# Patient Record
Sex: FEMALE | Race: WHITE | Employment: OTHER | URBAN - METROPOLITAN AREA
[De-identification: names, ages, dates, MRNs, and addresses within clinical notes are randomized per-mention and may not be internally consistent; named-entity substitution may affect disease eponyms.]

---

## 2017-01-05 RX ORDER — AMLODIPINE BESYLATE 5 MG/1
5 TABLET ORAL DAILY
Qty: 90 TAB | Refills: 3 | Status: SHIPPED | OUTPATIENT
Start: 2017-01-05 | End: 2017-12-29 | Stop reason: SDUPTHER

## 2017-01-20 ENCOUNTER — TELEPHONE (OUTPATIENT)
Dept: FAMILY MEDICINE CLINIC | Age: 82
End: 2017-01-20

## 2017-01-20 RX ORDER — DOXYCYCLINE 100 MG/1
100 TABLET ORAL 2 TIMES DAILY
Qty: 20 TAB | Refills: 0 | Status: SHIPPED | OUTPATIENT
Start: 2017-01-20 | End: 2017-01-30

## 2017-01-20 NOTE — TELEPHONE ENCOUNTER
Sent doxycycline in to [facility]. Take this twice a day with a full glass of water. Come in for an appointment if not feeling better next week.  Patient's daughter notified

## 2017-01-23 ENCOUNTER — CLINICAL SUPPORT (OUTPATIENT)
Dept: FAMILY MEDICINE CLINIC | Age: 82
End: 2017-01-23

## 2017-01-23 VITALS
OXYGEN SATURATION: 97 % | WEIGHT: 148 LBS | HEIGHT: 63 IN | DIASTOLIC BLOOD PRESSURE: 70 MMHG | SYSTOLIC BLOOD PRESSURE: 150 MMHG | BODY MASS INDEX: 26.22 KG/M2 | HEART RATE: 76 BPM

## 2017-01-23 DIAGNOSIS — Z51.81 ANTICOAGULATION GOAL OF INR 2 TO 3: ICD-10-CM

## 2017-01-23 DIAGNOSIS — I48.20 CHRONIC ATRIAL FIBRILLATION (HCC): Primary | ICD-10-CM

## 2017-01-23 DIAGNOSIS — Z79.01 ANTICOAGULATION GOAL OF INR 2 TO 3: ICD-10-CM

## 2017-01-23 LAB
INR BLD: 2.2
PT POC: 26.6 SEC
VALID INTERNAL CONTROL?: YES

## 2017-01-23 RX ORDER — WARFARIN SODIUM 5 MG/1
5 TABLET ORAL DAILY
COMMUNITY
End: 2018-11-23 | Stop reason: SDUPTHER

## 2017-01-23 NOTE — PROGRESS NOTES
Patient presents for a PT/INR  See med list and patient instructions along with results for verbal orders by Dr. Jackeline Sykes. Results for orders placed or performed in visit on 01/23/17   AMB POC PT/INR   Result Value Ref Range    VALID INTERNAL CONTROL POC Yes     Prothrombin time (POC) 26.6 sec    INR POC 2.2      Prior to Admission medications    Medication Sig Start Date End Date Taking? Authorizing Provider   warfarin (COUMADIN) 5 mg tablet Take 5 mg by mouth daily. Take one and a half tablets, 7.5 mg on Tuesday and Thursday. Take one tablet, 5mg all other days. Yes Historical Provider   doxycycline (ADOXA) 100 mg tablet Take 1 Tab by mouth two (2) times a day for 10 days. 1/20/17 1/30/17 Yes Alessandra Antoine MD   amLODIPine (NORVASC) 5 mg tablet Take 1 Tab by mouth daily. 1/5/17  Yes Ray López MD   flecainide (TAMBOCOR) 100 mg tablet Take 1 Tab by mouth three (3) times daily. 9/30/16  Yes Ray López MD   levothyroxine (SYNTHROID) 50 mcg tablet TAKE 1 TABLET BY MOUTH EVERY MORNING BEFORE BREAKFAST FOR THYROID 6/22/16  Yes Ray López MD   hydrochlorothiazide (HYDRODIURIL) 25 mg tablet Take 25 mg by mouth daily. Yes Historical Provider   doxazosin (CARDURA) 4 mg tablet Take 1 mg by mouth two (2) times a day. Yes Historical Provider     Patient Instructions   Take coumadin 5 mg tablet: take one and a half tablets, 7.5 mg on Tuesday and Thursday. Take one tablet, 5mg all other days. Return in week. She will come back in one week do to taking Doxycycline for 14 days. She declined AVS but voiced understanding of all instructions.

## 2017-01-23 NOTE — PATIENT INSTRUCTIONS
Take coumadin 5 mg tablet: take one and a half tablets, 7.5 mg on Tuesday and Thursday. Take one tablet, 5mg all other days. Return in week.

## 2017-02-03 ENCOUNTER — CLINICAL SUPPORT (OUTPATIENT)
Dept: FAMILY MEDICINE CLINIC | Age: 82
End: 2017-02-03

## 2017-02-03 VITALS
WEIGHT: 149 LBS | BODY MASS INDEX: 26.4 KG/M2 | HEIGHT: 63 IN | DIASTOLIC BLOOD PRESSURE: 52 MMHG | OXYGEN SATURATION: 97 % | HEART RATE: 78 BPM | SYSTOLIC BLOOD PRESSURE: 151 MMHG

## 2017-02-03 DIAGNOSIS — I48.20 CHRONIC ATRIAL FIBRILLATION (HCC): Primary | ICD-10-CM

## 2017-02-03 DIAGNOSIS — Z79.01 ANTICOAGULATION GOAL OF INR 2 TO 3: ICD-10-CM

## 2017-02-03 DIAGNOSIS — Z51.81 ANTICOAGULATION GOAL OF INR 2 TO 3: ICD-10-CM

## 2017-02-03 LAB
INR BLD: 2.3
PT POC: 28.8 SEC
VALID INTERNAL CONTROL?: YES

## 2017-02-03 NOTE — PATIENT INSTRUCTIONS
Continue coumadin 5 mg tablet, Take one and a half tablets, 7.5 mg on Tuesday and Thursday. Take one tablet, 5mg all other days. Come back for follow in one month.

## 2017-02-03 NOTE — PROGRESS NOTES
Patient presents for a PT/INR  See med list and patient instructions along with results for verbal orders by Dr. Cathi Huerta. Results for orders placed or performed in visit on 02/03/17   AMB POC PT/INR   Result Value Ref Range    VALID INTERNAL CONTROL POC Yes     Prothrombin time (POC) 28.8 sec    INR POC 2.3      Vitals:    02/03/17 1437   BP: 151/52   Pulse: 78   SpO2: 97%   Weight: 149 lb (67.6 kg)   Height: 5' 3\" (1.6 m)     Prior to Admission medications    Medication Sig Start Date End Date Taking? Authorizing Provider   warfarin (COUMADIN) 5 mg tablet Take 5 mg by mouth daily. Take one and a half tablets, 7.5 mg on Tuesday and Thursday. Take one tablet, 5mg all other days. Yes Historical Provider   amLODIPine (NORVASC) 5 mg tablet Take 1 Tab by mouth daily. 1/5/17  Yes Rema Valentine MD   flecainide (TAMBOCOR) 100 mg tablet Take 1 Tab by mouth three (3) times daily. 9/30/16  Yes Rema Valentine MD   levothyroxine (SYNTHROID) 50 mcg tablet TAKE 1 TABLET BY MOUTH EVERY MORNING BEFORE BREAKFAST FOR THYROID 6/22/16  Yes Rema Valentine MD   hydrochlorothiazide (HYDRODIURIL) 25 mg tablet Take 25 mg by mouth daily. Yes Historical Provider   doxazosin (CARDURA) 4 mg tablet Take 1 mg by mouth two (2) times a day. Yes Historical Provider     Patient Instructions   Continue coumadin 5 mg tablet, Take one and a half tablets, 7.5 mg on Tuesday and Thursday. Take one tablet, 5mg all other days. Come back for follow in one month.      Verbalized understanding of all instructions, declined need for AVS

## 2017-03-23 ENCOUNTER — HOSPITAL ENCOUNTER (OUTPATIENT)
Dept: LAB | Age: 82
Discharge: HOME | End: 2017-03-23
Payer: MEDICARE

## 2017-03-23 ENCOUNTER — OFFICE VISIT (OUTPATIENT)
Dept: FAMILY MEDICINE CLINIC | Age: 82
End: 2017-03-23

## 2017-03-23 VITALS
RESPIRATION RATE: 17 BRPM | BODY MASS INDEX: 26.22 KG/M2 | HEIGHT: 63 IN | WEIGHT: 148 LBS | HEART RATE: 73 BPM | DIASTOLIC BLOOD PRESSURE: 61 MMHG | OXYGEN SATURATION: 97 % | TEMPERATURE: 98.2 F | SYSTOLIC BLOOD PRESSURE: 159 MMHG

## 2017-03-23 DIAGNOSIS — E03.9 HYPOTHYROIDISM, UNSPECIFIED TYPE: ICD-10-CM

## 2017-03-23 DIAGNOSIS — Z00.00 ROUTINE GENERAL MEDICAL EXAMINATION AT A HEALTH CARE FACILITY: Primary | ICD-10-CM

## 2017-03-23 DIAGNOSIS — E78.5 HYPERLIPIDEMIA, UNSPECIFIED HYPERLIPIDEMIA TYPE: ICD-10-CM

## 2017-03-23 DIAGNOSIS — Z13.39 SCREENING FOR ALCOHOLISM: ICD-10-CM

## 2017-03-23 DIAGNOSIS — I10 ESSENTIAL HYPERTENSION: ICD-10-CM

## 2017-03-23 DIAGNOSIS — I27.20 PULMONARY HYPERTENSION (HCC): ICD-10-CM

## 2017-03-23 DIAGNOSIS — I48.20 CHRONIC ATRIAL FIBRILLATION (HCC): ICD-10-CM

## 2017-03-23 DIAGNOSIS — Z23 ENCOUNTER FOR IMMUNIZATION: ICD-10-CM

## 2017-03-23 LAB
INR BLD: 1.6 (ref 1–1.5)
PT POC: 19 SEC (ref 9.1–12)
VALID INTERNAL CONTROL?: YES

## 2017-03-23 PROCEDURE — 85025 COMPLETE CBC W/AUTO DIFF WBC: CPT

## 2017-03-23 PROCEDURE — 36415 COLL VENOUS BLD VENIPUNCTURE: CPT

## 2017-03-23 PROCEDURE — 84443 ASSAY THYROID STIM HORMONE: CPT

## 2017-03-23 PROCEDURE — 80053 COMPREHEN METABOLIC PANEL: CPT

## 2017-03-23 PROCEDURE — 80061 LIPID PANEL: CPT

## 2017-03-23 NOTE — PATIENT INSTRUCTIONS
Medicare Wellness Visit, Female    The best way to live healthy is to have a healthy lifestyle by eating a well-balanced diet, exercising regularly, limiting alcohol and stopping smoking. Regular physical exams and screening tests are another way to keep healthy. Preventive exams provided by your health care provider can find health problems before they become diseases or illnesses. Preventive services including immunizations, screening tests, monitoring and exams can help you take care of your own health. All people over age 72 should have a pneumovax  and and a prevnar shot to prevent pneumonia. These are once in a lifetime unless you and your provider decide differently. All people over 65 should have a yearly flu shot and a tetanus vaccine every 10 years. A bone mass density to screen for osteoporosis or thinning of the bones should be done every 2 years after 65. Screening for diabetes mellitus with a blood sugar test should be done every year. Glaucoma is a disease of the eye due to increased ocular pressure that can lead to blindness and it should be done every year by an eye professional.    Cardiovascular screening tests that check for elevated lipids (fatty part of blood) which can lead to heart disease and strokes should be done every 5 years. Colorectal screening that evaluates for blood or polyps in your colon should be done yearly as a stool test or every five years as a flexible sigmoidoscope or every 10 years as a colonoscopy up to age 76. Breast cancer screening with a mammogram is recommended biennially  for women age 54-69. Screening for cervical cancer with a pap smear and pelvic exam is recommended for women after age 72 years every 2 years up to age 79 or when the provider and patient decide to stop. If there is a history of cervical abnormalities or other increased risk for cancer then the test is recommended yearly.     Hepatitis C screening is also recommended for anyone born between [birth year] through [address]. A shingles vaccine is also recommended once in a lifetime after age 61. Your Medicare Wellness Exam is recommended annually. Here is a list of your current Health Maintenance items with a due date:  Health Maintenance Due   Topic Date Due    Pneumococcal Vaccine (2 of 2 - PCV13) 12/06/2014            Vaccine Information Statement     Pneumococcal Conjugate Vaccine (PCV13): What You Need to Know    Many Vaccine Information Statements are available in Swedish and other languages. See www.immunize.org/vis. Hojas de información Sobre Vacunas están disponibles en español y en muchos otros idiomas. Visite www.immunize.org/vis. 1. Why get vaccinated? Vaccination can protect both children and adults from pneumococcal disease. Pneumococcal disease is caused by bacteria that can spread from person to person through close contact. It can cause ear infections, and it can also lead to more serious infections of the:   Lungs (pneumonia),   Blood (bacteremia), and   Covering of the brain and spinal cord (meningitis). Pneumococcal pneumonia is most common among adults. Pneumococcal meningitis can cause deafness and brain damage, and it kills about 1 child in 10 who get it. Anyone can get pneumococcal disease, but children under 3years of age and adults 72 years and older, people with certain medical conditions, and cigarette smokers are at the highest risk. Before there was a vaccine, the [facility] saw:   more than 700 cases of meningitis,   about 13,000 blood infections,   about 5 million ear infections, and   about 200 deaths  in children under 5 each year from pneumococcal disease. Since vaccine became available, severe pneumococcal disease in these children has fallen by 88%. About 18,000 older adults die of pneumococcal disease each year in the [place].     Treatment of pneumococcal infections with penicillin and other drugs is not as effective as it used to be, because some strains of the disease have become resistant to these drugs. This makes prevention of the disease, through vaccination, even more important. 2. PCV13 vaccine    Pneumococcal conjugate vaccine (called PCV13) protects against 13 types of pneumococcal bacteria. PCV13 is routinely given to children at 2, 4, 6, and 1515 months of age. It is also recommended for children and adults 3to 59years of age with certain health conditions, and for all adults 72years of age and older. Your doctor can give you details. 3. Some people should not get this vaccine    Anyone who has ever had a life-threatening allergic reaction to a dose of this vaccine, to an earlier pneumococcal vaccine called PCV7, or to any vaccine containing diphtheria toxoid (for example, DTaP), should not get PCV13. Anyone with a severe allergy to any component of PCV13 should not get the vaccine. Tell your doctor if the person being vaccinated has any severe allergies. If the person scheduled for vaccination is not feeling well, your healthcare provider might decide to reschedule the shot on another day. 4. Risks of a vaccine reaction    With any medicine, including vaccines, there is a chance of reactions. These are usually mild and go away on their own, but serious reactions are also possible. Problems reported following PCV13 varied by age and dose in the series. The most common problems reported among children were:    About half became drowsy after the shot, had a temporary loss of appetite, or had redness or tenderness where the shot was given.  About 1 out of 3 had swelling where the shot was given.  About 1 out of 3 had a mild fever, and about 1 in 20 had a fever over 102.2°F.   Up to about 8 out of 10 became fussy or irritable. Adults have reported pain, redness, and swelling where the shot was given; also mild fever, fatigue, headache, chills, or muscle pain.     Abida Battles children who get PCV13 along with inactivated flu vaccine at the same time may be at increased risk for seizures caused by fever. Ask your doctor for more information. Problems that could happen after any vaccine:     People sometimes faint after a medical procedure, including vaccination. Sitting or lying down for about 15 minutes can help prevent fainting, and injuries caused by a fall. Tell your doctor if you feel dizzy, or have vision changes or ringing in the ears.  Some older children and adults get severe pain in the shoulder and have difficulty moving the arm where a shot was given. This happens very rarely.  Any medication can cause a severe allergic reaction. Such reactions from a vaccine are very rare, estimated at about 1 in a million doses, and would happen within a few minutes to a few hours after the vaccination. As with any medicine, there is a very small chance of a vaccine causing a serious injury or death. The safety of vaccines is always being monitored. For more information, visit: www.cdc.gov/vaccinesafety/     5. What if there is a serious reaction? What should I look for?  Look for anything that concerns you, such as signs of a severe allergic reaction, very high fever, or unusual behavior. Signs of a severe allergic reaction can include hives, swelling of the face and throat, difficulty breathing, a fast heartbeat, dizziness, and weakness  usually within a few minutes to a few hours after the vaccination. What should I do?  If you think it is a severe allergic reaction or other emergency that cant wait, call 9-1-1 or get the person to the nearest hospital. Otherwise, call your doctor. Reactions should be reported to the Vaccine Adverse Event Reporting System (VAERS). Your doctor should file this report, or you can do it yourself through the VAERS web site at www.vaers. hhs.gov, or by calling 3-501.893.5965.     [employer] does not give medical advice. 6. The National Vaccine Injury Compensation Program    The [facility] Vaccine Injury Compensation Program (VICP) is a federal program that was created to compensate people who may have been injured by certain vaccines. Persons who believe they may have been injured by a vaccine can learn about the program and about filing a claim by calling 3-705.598.2474 or visiting the [address] website at www.[facility].gov/vaccinecompensation. There is a time limit to file a claim for compensation. 7. How can I learn more?  Ask your healthcare provider. He or she can give you the vaccine package insert or suggest other sources of information.  Call your local or state health department.  Contact the Centers for Disease Control and Prevention (CDC):  - Call 2-634.616.4618 (1-800-CDC-INFO) or  - Visit CDCs website at www.cdc.gov/vaccines    Vaccine Information Statement   PCV13 Vaccine   11/5/2015   [address] [id number]-04    Department of Health and Human Services  Centers for Disease Control and Prevention    Office Use Only

## 2017-03-23 NOTE — PROGRESS NOTES
This is a Subsequent Medicare Annual Wellness Visit providing Personalized Prevention Plan Services (PPPS) (Performed 12 months after initial AWV and PPPS )    I have reviewed the patient's medical history in detail and updated the computerized patient record. History     Past Medical History:   Diagnosis Date    [facility] fibrillation ([facility] [address]     Hyperlipidemia 6/18/2014    Hypertension     Hypothyroid 3/18/2014    Pulmonary hypertension ([facility] [address] 2/1/2016 1/16 - PASP=68, EF 55%     S/P lobectomy of lung       Past Surgical History:   Procedure Laterality Date    CHEST SURGERY PROCEDURE UNLISTED  1951    lower lobe right lung    HX APPENDECTOMY  2008     Current Outpatient Prescriptions   Medication Sig Dispense Refill    warfarin (COUMADIN) 5 mg tablet Take 5 mg by mouth daily. Take one and a half tablets, 7.5 mg on Tuesday and Thursday. Take one tablet, 5mg all other days.  amLODIPine (NORVASC) 5 mg tablet Take 1 Tab by mouth daily. 90 Tab 3    flecainide (TAMBOCOR) 100 mg tablet Take 1 Tab by mouth three (3) times daily. 270 Tab 1    levothyroxine (SYNTHROID) 50 mcg tablet TAKE 1 TABLET BY MOUTH EVERY MORNING BEFORE BREAKFAST FOR THYROID 90 Tab 3    hydrochlorothiazide (HYDRODIURIL) 25 mg tablet Take 25 mg by mouth daily.  doxazosin (CARDURA) 4 mg tablet Take 1 mg by mouth two (2) times a day. No Known Allergies  Family History   Problem Relation Age of Onset    Cancer Mother      Social History   Substance Use Topics    Smoking status: Never Smoker    Smokeless tobacco: Never Used    Alcohol use No     Patient Active Problem List   Diagnosis Code    Hypertension I10    Atrial fibrillation (HCC) I48.91    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    Hypothyroid E03.9    Hyperlipidemia E78.5    Pulmonary hypertension (HCC) I27.2     A fib and hypertension - had stress echo earlier this week and was told all was good. Has switched from Dr. Petty Bey to Dr Bud Santana.   No medications were changed - remains on flecainide. No swelling in feet, no chest pain, no shortness of breath. Not taking flecainide regularly. States she's taking coumadin in the mornings - not at a regular time. On tues and thurs taking 1.5 pills, all other days taking 1. Today's INR is 1.6. Hypothyroidism - no hair loss, no constipation, no dry skin or brittle nails, no palpitations. Taking medication each morning on an empty stomach. Hyperlipidemia - takes medication at night as directed, no muscle aches. Tries to watch diet. Depression Risk Factor Screening:     PHQ 2 / 9, over the last two weeks 3/23/2017   Little interest or pleasure in doing things Not at all   Feeling down, depressed or hopeless Not at all   Total Score PHQ 2 0     Alcohol Risk Factor Screening: On any occasion during the past 3 months, have you had more than 3 drinks containing alcohol? No    Do you average more than 7 drinks per week? No      Functional Ability and Level of Safety:     Hearing Loss   none    Activities of Daily Living   Self-care. Requires assistance with: no ADLs    Fall Risk     Fall Risk Assessment, last 12 mths 3/23/2017   Able to walk? Yes   Fall in past 12 months? No     Abuse Screen   Patient is not abused    Review of Systems   A comprehensive review of systems was negative except for that written in the HPI. Physical Examination     Evaluation of Cognitive Function:  Mood/affect:  happy  Appearance: age appropriate  Family member/caregiver input: none    Visit Vitals    /61 (BP 1 Location: Left arm, BP Patient Position: Sitting)    Pulse 73    Temp 98.2 °F (36.8 °C) (Oral)    Resp 17    Ht 5' 3\" (1.6 m)    Wt 148 lb (67.1 kg)    SpO2 97%    BMI 26.22 kg/m2     General:  Alert, cooperative, no distress, appears stated age. Head:  Normocephalic, without obvious abnormality, atraumatic. Eyes:  Conjunctivae/corneas clear. PERRL, EOMs intact. Fundi benign.    Ears:  Normal TMs and external ear canals both ears. Nose: Nares normal. Septum midline. Mucosa normal. No drainage or sinus tenderness. Throat: Lips, mucosa, and tongue normal. Teeth and gums normal.   Neck: Supple, symmetrical, trachea midline, no adenopathy, thyroid: no enlargement/tenderness/nodules, no carotid bruit and no JVD. Back:   Symmetric, no curvature. ROM normal. No CVA tenderness. Lungs:   Clear to auscultation bilaterally. Chest wall:  No tenderness or deformity. Heart:  Regular rate and rhythm, S1, S2 normal, no murmur, click, rub or gallop. Abdomen:   Soft, non-tender. Bowel sounds normal. No masses,  No organomegaly. Extremities: Extremities normal, atraumatic, no cyanosis or edema. Pulses: 2+ and symmetric all extremities. Skin: Some varicosities in lower extremities   Lymph nodes: Cervical, supraclavicular, and axillary nodes normal.   Neurologic: CNII-XII intact. Normal strength, sensation and reflexes throughout. Slight tremor of hands. Patient Care Team:  Lewis Mo MD as PCP - General (Internal Medicine)  Ghada Westfall MD (Cardiology)    Advice/Referrals/Counseling   Education and counseling provided:  Are appropriate based on today's review and evaluation  End-of-Life planning (with patient's consent)      Assessment/Plan       ICD-10-CM ICD-9-CM    1. Routine general medical examination at a health care facility - Health Maintenance reviewed - prevnar today Z00.00 V70.0    2. Screening for alcoholism Z13.89 V79.1    3. Chronic atrial fibrillation (HCC) - INR low today, but may have missed a dose. No change to dose, repeat INR in 1 week. I48.2 427.31 AMB POC PT/INR   4. Pulmonary hypertension (HCC) I27.2 416.8    5. Essential hypertension - At goal given age. Continue current medications. I10 401.9 CBC WITH AUTOMATED DIFF      METABOLIC PANEL, COMPREHENSIVE   6. Hypothyroidism, unspecified type -No changes in medications pending lab results.    E03.9 244.9 TSH 3RD GENERATION   7. Hyperlipidemia, unspecified hyperlipidemia type - No changes in medications pending lab results. E78.5 272.4 LIPID PANEL   . Current Outpatient Prescriptions   Medication Sig Dispense Refill    warfarin (COUMADIN) 5 mg tablet Take 5 mg by mouth daily. Take one and a half tablets, 7.5 mg on Tuesday and Thursday. Take one tablet, 5mg all other days.  amLODIPine (NORVASC) 5 mg tablet Take 1 Tab by mouth daily. 90 Tab 3    flecainide (TAMBOCOR) 100 mg tablet Take 1 Tab by mouth three (3) times daily. 270 Tab 1    levothyroxine (SYNTHROID) 50 mcg tablet TAKE 1 TABLET BY MOUTH EVERY MORNING BEFORE BREAKFAST FOR THYROID 90 Tab 3    hydrochlorothiazide (HYDRODIURIL) 25 mg tablet Take 25 mg by mouth daily.  doxazosin (CARDURA) 4 mg tablet Take 1 mg by mouth two (2) times a day. Verbal and written instructions (see AVS) provided. Patient expresses understanding of diagnosis and treatment plan.

## 2017-03-23 NOTE — MR AVS SNAPSHOT
Visit Information Date & Time Provider Department Dept. Phone Encounter #  
 3/23/2017  9:15 AM Jyoti Gutiérrez, [address] [address] [phone] [id number] Upcoming Health Maintenance Date Due Pneumococcal 65+ Low/Medium Risk (2 of 2 - PCV13) 12/6/2014 MEDICARE YEARLY EXAM 5/27/2016 GLAUCOMA SCREENING Q2Y 5/27/2017 DTaP/Tdap/Td series (2 - Td) 10/22/2024 Allergies as of 3/23/2017  Review Complete On: 3/23/2017 By: Jyoti Gutiérrez MD  
 No Known Allergies Current Immunizations  Reviewed on 3/23/2017 Name Date Influenza High Dose Vaccine PF 9/22/2016, 10/20/2015, 10/22/2014 Influenza Vaccine 12/6/2013 Pneumococcal Conjugate (PCV-13)  Incomplete Pneumococcal Polysaccharide (PPSV-23) 12/6/2013 Tdap 10/22/2014 Reviewed by Jyoti Gutiérrez MD on 3/23/2017 at  9:57 AM  
You Were Diagnosed With   
  
 Codes Comments Routine general medical examination at a health care facility    -  Primary ICD-10-CM: Z00.00 ICD-9-CM: V70.0 Screening for alcoholism     ICD-10-CM: Z13.89 ICD-9-CM: V79.1 Chronic atrial fibrillation (HCC)     ICD-10-CM: K35.7 ICD-9-CM: 427.31 Pulmonary hypertension ([facility] [address]     ICD-10-CM: I27.2 ICD-9-CM: 416.8 Essential hypertension     ICD-10-CM: I10 
ICD-9-CM: 401.9 Hypothyroidism, unspecified type     ICD-10-CM: E03.9 ICD-9-CM: 244.9 Hyperlipidemia, unspecified hyperlipidemia type     ICD-10-CM: E78.5 ICD-9-CM: 272.4 Encounter for immunization     ICD-10-CM: T38 ICD-9-CM: V03.89 Vitals BP Pulse Temp Resp Height(growth percentile) Weight(growth percentile) 159/61 (BP 1 Location: Left arm, BP Patient Position: Sitting) 73 98.2 °F (36.8 °C) (Oral) 17 5' 3\" (1.6 m) 148 lb (67.1 kg) SpO2 BMI OB Status Smoking Status 97% 26.22 kg/m2 Postmenopausal Never Smoker BMI and BSA Data  Body Mass Index Body Surface Area  
 26.22 kg/m 2 1.73 m 2  
  
  
 Preferred Pharmacy Pharmacy Name Phone [facility], [address] [phone] Your Updated Medication List  
  
   
This list is accurate as of: 3/23/17 10:06 AM.  Always use your most recent med list. amLODIPine 5 mg tablet Commonly known as:  Celena Pace Take 1 Tab by mouth daily. CARDURA 4 mg tablet Generic drug:  doxazosin Take 1 mg by mouth two (2) times a day. COUMADIN 5 mg tablet Generic drug:  warfarin Take 5 mg by mouth daily. Take one and a half tablets, 7.5 mg on Tuesday and Thursday. Take one tablet, 5mg all other days. flecainide 100 mg tablet Commonly known as:  TAMBOCOR Take 1 Tab by mouth three (3) times daily. hydroCHLOROthiazide 25 mg tablet Commonly known as:  HYDRODIURIL Take 25 mg by mouth daily. levothyroxine 50 mcg tablet Commonly known as:  SYNTHROID  
TAKE 1 TABLET BY MOUTH EVERY MORNING BEFORE BREAKFAST FOR THYROID We Performed the Following AMB POC PT/INR [20921 CPT(R)] CBC WITH AUTOMATED DIFF [87983 CPT(R)] LIPID PANEL [12287 CPT(R)] METABOLIC PANEL, COMPREHENSIVE [15193 CPT(R)] PNEUMOCOCCAL CONJ VACCINE 13 VALENT IM [id number] CPT(R)] TSH 3RD GENERATION [49257 CPT(R)] Patient Instructions Medicare Wellness Visit, Female The best way to live healthy is to have a healthy lifestyle by eating a well-balanced diet, exercising regularly, limiting alcohol and stopping smoking. Regular physical exams and screening tests are another way to keep healthy. Preventive exams provided by your health care provider can find health problems before they become diseases or illnesses. Preventive services including immunizations, screening tests, monitoring and exams can help you take care of your own health.  
 
All people over age 72 should have a pneumovax  and and a prevnar shot to prevent pneumonia. These are once in a lifetime unless you and your provider decide differently. All people over 65 should have a yearly flu shot and a tetanus vaccine every 10 years. A bone mass density to screen for osteoporosis or thinning of the bones should be done every 2 years after 65. Screening for diabetes mellitus with a blood sugar test should be done every year. Glaucoma is a disease of the eye due to increased ocular pressure that can lead to blindness and it should be done every year by an eye professional. 
 
Cardiovascular screening tests that check for elevated lipids (fatty part of blood) which can lead to heart disease and strokes should be done every 5 years. Colorectal screening that evaluates for blood or polyps in your colon should be done yearly as a stool test or every five years as a flexible sigmoidoscope or every 10 years as a colonoscopy up to age 76. Breast cancer screening with a mammogram is recommended biennially  for women age 54-69. Screening for cervical cancer with a pap smear and pelvic exam is recommended for women after age 72 years every 2 years up to age 79 or when the provider and patient decide to stop. If there is a history of cervical abnormalities or other increased risk for cancer then the test is recommended yearly. Hepatitis C screening is also recommended for anyone born between 80 through [address]. A shingles vaccine is also recommended once in a lifetime after age 61. Your Medicare Wellness Exam is recommended annually. Here is a list of your current Health Maintenance items with a due date: 
Health Maintenance Due Topic Date Due  Pneumococcal Vaccine (2 of 2 - PCV13) 12/06/2014 Vaccine Information Statement Pneumococcal Conjugate Vaccine (PCV13): What You Need to Know Many Vaccine Information Statements are available in Vincentian and other languages. See www.immunize.org/vis. Hojas de información Sobre Vacunas están disponibles en español y en muchos otros idiomas. Visite www.immunize.org/vis. 1. Why get vaccinated? Vaccination can protect both children and adults from pneumococcal disease. Pneumococcal disease is caused by bacteria that can spread from person to person through close contact. It can cause ear infections, and it can also lead to more serious infections of the: 
 Lungs (pneumonia),  Blood (bacteremia), and 
 Covering of the brain and spinal cord (meningitis). Pneumococcal pneumonia is most common among adults. Pneumococcal meningitis can cause deafness and brain damage, and it kills about 1 child in 10 who get it. Anyone can get pneumococcal disease, but children under 3years of age and adults 72 years and older, people with certain medical conditions, and cigarette smokers are at the highest risk. Before there was a vaccine, the [facility] saw: 
 more than 700 cases of meningitis, 
 about 13,000 blood infections, 
 about 5 million ear infections, and 
 about 200 deaths 
in children under 5 each year from pneumococcal disease. Since vaccine became available, severe pneumococcal disease in these children has fallen by 88%. About 18,000 older adults die of pneumococcal disease each year in the [place]. Treatment of pneumococcal infections with penicillin and other drugs is not as effective as it used to be, because some strains of the disease have become resistant to these drugs. This makes prevention of the disease, through vaccination, even more important. 2. PCV13 vaccine Pneumococcal conjugate vaccine (called PCV13) protects against 13 types of pneumococcal bacteria. PCV13 is routinely given to children at 2, 4, 6, and 1515 months of age. It is also recommended for children and adults 3to 59years of age with certain health conditions, and for all adults 72years of age and older. Your doctor can give you details. 3. Some people should not get this vaccine Anyone who has ever had a life-threatening allergic reaction to a dose of this vaccine, to an earlier pneumococcal vaccine called PCV7, or to any vaccine containing diphtheria toxoid (for example, DTaP), should not get PCV13. Anyone with a severe allergy to any component of PCV13 should not get the vaccine. Tell your doctor if the person being vaccinated has any severe allergies. If the person scheduled for vaccination is not feeling well, your healthcare provider might decide to reschedule the shot on another day. 4. Risks of a vaccine reaction With any medicine, including vaccines, there is a chance of reactions. These are usually mild and go away on their own, but serious reactions are also possible. Problems reported following PCV13 varied by age and dose in the series. The most common problems reported among children were:  About half became drowsy after the shot, had a temporary loss of appetite, or had redness or tenderness where the shot was given.  About 1 out of 3 had swelling where the shot was given.  About 1 out of 3 had a mild fever, and about 1 in 20 had a fever over 102.2°F. 
 Up to about 8 out of 10 became fussy or irritable. Adults have reported pain, redness, and swelling where the shot was given; also mild fever, fatigue, headache, chills, or muscle pain. Genie Castellani children who get PCV13 along with inactivated flu vaccine at the same time may be at increased risk for seizures caused by fever. Ask your doctor for more information. Problems that could happen after any vaccine:  People sometimes faint after a medical procedure, including vaccination. Sitting or lying down for about 15 minutes can help prevent fainting, and injuries caused by a fall. Tell your doctor if you feel dizzy, or have vision changes or ringing in the ears.  
 
 Some older children and adults get severe pain in the shoulder and have difficulty moving the arm where a shot was given. This happens very rarely.  Any medication can cause a severe allergic reaction. Such reactions from a vaccine are very rare, estimated at about 1 in a million doses, and would happen within a few minutes to a few hours after the vaccination. As with any medicine, there is a very small chance of a vaccine causing a serious injury or death. The safety of vaccines is always being monitored. For more information, visit: www.cdc.gov/vaccinesafety/  
 
5. What if there is a serious reaction? What should I look for?  Look for anything that concerns you, such as signs of a severe allergic reaction, very high fever, or unusual behavior. Signs of a severe allergic reaction can include hives, swelling of the face and throat, difficulty breathing, a fast heartbeat, dizziness, and weakness  usually within a few minutes to a few hours after the vaccination. What should I do?  If you think it is a severe allergic reaction or other emergency that cant wait, call 9-1-1 or get the person to the nearest hospital. Otherwise, call your doctor. Reactions should be reported to the Vaccine Adverse Event Reporting System (VAERS). Your doctor should file this report, or you can do it yourself through the VAERS web site at www.vaers. [facility].gov, or by calling 7-900.875.6202. VAERS does not give medical advice. 6. The National Vaccine Injury Compensation Program 
 
The [facility] Vaccine Injury Compensation Program (VICP) is a federal program that was created to compensate people who may have been injured by certain vaccines. Persons who believe they may have been injured by a vaccine can learn about the program and about filing a claim by calling 3-342.692.5719 or visiting the [facility] website at www.[facility].gov/vaccinecompensation. There is a time limit to file a claim for compensation. 7. How can I learn more?  Ask your healthcare provider. He or she can give you the vaccine package insert or suggest other sources of information.  Call your local or state health department.  Contact the Centers for Disease Control and Prevention (CDC): 
- Call 7-497.921.7129 (1-800-CDC-INFO) or 
- Visit CDCs website at www.cdc.gov/vaccines Vaccine Information Statement PCV13 Vaccine 11/5/2015  
[address] [place] [id number]-[id number] [facility] and [employer] Centers for Disease Control and Prevention Office Use Only Introducing [facility] & HEALTH SERVICES! Judy Vega introduces [employer] patient portal. Now you can access parts of your medical record, email your doctor's office, and request medication refills online. 1. In your internet browser, go to [email or web address] 2. Click on the First Time User? Click Here link in the Sign In box. You will see the New Member Sign Up page. 3. Enter your [employer] Access Code exactly as it appears below. You will not need to use this code after youve completed the sign-up process. If you do not sign up before the expiration date, you must request a new code. · [employer] Access Code: [email or web address] Expires: 5/4/2017  3:45 PM 
 
4. Enter the last four digits of your Social Security Number (xxxx) and Date of Birth (mm/dd/yyyy) as indicated and click Submit. You will be taken to the next sign-up page. 5. Create a [employer] ID. This will be your [employer] login ID and cannot be changed, so think of one that is secure and easy to remember. 6. Create a [employer] password. You can change your password at any time. 7. Enter your Password Reset Question and Answer. This can be used at a later time if you forget your password. 8. Enter your e-mail address. You will receive e-mail notification when new information is available in [address] 9. Click Sign Up. You can now view and download portions of your medical record. 10. Click the Download Summary menu link to download a portable copy of your medical information. If you have questions, please visit the Frequently Asked Questions section of the [employer] website. Remember, [employer] is NOT to be used for urgent needs. For medical emergencies, dial 911. Now available from your iPhone and Android! Please provide this summary of care documentation to your next provider. Your primary care clinician is listed as Walter Hernadez. If you have any questions after today's visit, please call [phone].

## 2017-03-24 LAB
ALBUMIN SERPL-MCNC: 4.6 G/DL (ref 3.5–4.7)
ALBUMIN/GLOB SERPL: 2 {RATIO} (ref 1.2–2.2)
ALP SERPL-CCNC: 71 IU/L (ref 39–117)
ALT SERPL-CCNC: 11 IU/L (ref 0–32)
AST SERPL-CCNC: 20 IU/L (ref 0–40)
BASOPHILS # BLD AUTO: 0 X10E3/UL (ref 0–0.2)
BASOPHILS NFR BLD AUTO: 0 %
BILIRUB SERPL-MCNC: 0.4 MG/DL (ref 0–1.2)
BUN SERPL-MCNC: 27 MG/DL (ref 8–27)
BUN/CREAT SERPL: 34 (ref 11–26)
CALCIUM SERPL-MCNC: 10.1 MG/DL (ref 8.7–10.3)
CHLORIDE SERPL-SCNC: 96 MMOL/L (ref 96–106)
CHOLEST SERPL-MCNC: 232 MG/DL (ref 100–199)
CO2 SERPL-SCNC: 27 MMOL/L (ref 18–29)
CREAT SERPL-MCNC: 0.8 MG/DL (ref 0.57–1)
EOSINOPHIL # BLD AUTO: 0.1 X10E3/UL (ref 0–0.4)
EOSINOPHIL NFR BLD AUTO: 2 %
ERYTHROCYTE [DISTWIDTH] IN BLOOD BY AUTOMATED COUNT: 14.5 % (ref 12.3–15.4)
GLOBULIN SER CALC-MCNC: 2.3 G/DL (ref 1.5–4.5)
GLUCOSE SERPL-MCNC: 90 MG/DL (ref 65–99)
HCT VFR BLD AUTO: 39.8 % (ref 34–46.6)
HDLC SERPL-MCNC: 91 MG/DL
HGB BLD-MCNC: 12.9 G/DL (ref 11.1–15.9)
IMM GRANULOCYTES # BLD: 0 X10E3/UL (ref 0–0.1)
IMM GRANULOCYTES NFR BLD: 0 %
INTERPRETATION, 910389: NORMAL
LDLC SERPL CALC-MCNC: 124 MG/DL (ref 0–99)
LYMPHOCYTES # BLD AUTO: 1.7 X10E3/UL (ref 0.7–3.1)
LYMPHOCYTES NFR BLD AUTO: 31 %
MCH RBC QN AUTO: 31 PG (ref 26.6–33)
MCHC RBC AUTO-ENTMCNC: 32.4 G/DL (ref 31.5–35.7)
MCV RBC AUTO: 96 FL (ref 79–97)
MONOCYTES # BLD AUTO: 0.6 X10E3/UL (ref 0.1–0.9)
MONOCYTES NFR BLD AUTO: 11 %
NEUTROPHILS # BLD AUTO: 3 X10E3/UL (ref 1.4–7)
NEUTROPHILS NFR BLD AUTO: 56 %
PLATELET # BLD AUTO: 208 X10E3/UL (ref 150–379)
POTASSIUM SERPL-SCNC: 4.7 MMOL/L (ref 3.5–5.2)
PROT SERPL-MCNC: 6.9 G/DL (ref 6–8.5)
RBC # BLD AUTO: 4.16 X10E6/UL (ref 3.77–5.28)
SODIUM SERPL-SCNC: 137 MMOL/L (ref 134–144)
TRIGL SERPL-MCNC: 86 MG/DL (ref 0–149)
TSH SERPL DL<=0.005 MIU/L-ACNC: 3.99 UIU/ML (ref 0.45–4.5)
VLDLC SERPL CALC-MCNC: 17 MG/DL (ref 5–40)
WBC # BLD AUTO: 5.4 X10E3/UL (ref 3.4–10.8)

## 2017-04-05 ENCOUNTER — CLINICAL SUPPORT (OUTPATIENT)
Dept: FAMILY MEDICINE CLINIC | Age: 82
End: 2017-04-05

## 2017-04-05 VITALS
WEIGHT: 150 LBS | BODY MASS INDEX: 26.58 KG/M2 | DIASTOLIC BLOOD PRESSURE: 56 MMHG | HEIGHT: 63 IN | HEART RATE: 77 BPM | OXYGEN SATURATION: 97 % | SYSTOLIC BLOOD PRESSURE: 123 MMHG

## 2017-04-05 DIAGNOSIS — I27.20 PULMONARY HYPERTENSION (HCC): Primary | ICD-10-CM

## 2017-04-05 DIAGNOSIS — I48.20 CHRONIC ATRIAL FIBRILLATION (HCC): ICD-10-CM

## 2017-04-05 LAB
INR BLD: 1.5
PT POC: 18 SEC
VALID INTERNAL CONTROL?: YES

## 2017-04-05 NOTE — PROGRESS NOTES
Patient presents for a PT/INR  See med list and patient instructions along with results for verbal orders by Vanessa Park NP. Results for orders placed or performed in visit on 04/05/17   AMB POC PT/INR   Result Value Ref Range    VALID INTERNAL CONTROL POC Yes     Prothrombin time (POC) 18.0 sec    INR POC 1.5      Vitals:    04/05/17 0913   BP: 123/56   Pulse: 77   SpO2: 97%   Weight: 150 lb (68 kg)   Height: 5' 3\" (1.6 m)     Prior to Admission medications    Medication Sig Start Date End Date Taking? Authorizing Provider   warfarin (COUMADIN) 5 mg tablet Take 5 mg by mouth daily. Take one and a half tablets, 7.5 mg on Tuesday and Thursday Saturday. Take one tablet, 5mg all other days. Yes Historical Provider   amLODIPine (NORVASC) 5 mg tablet Take 1 Tab by mouth daily. 1/5/17  Yes Zeferino Dalal MD   flecainide (TAMBOCOR) 100 mg tablet Take 1 Tab by mouth three (3) times daily. 9/30/16  Yes Zeferino Dalal MD   levothyroxine (SYNTHROID) 50 mcg tablet TAKE 1 TABLET BY MOUTH EVERY MORNING BEFORE BREAKFAST FOR THYROID 6/22/16  Yes Zeferino Dalal MD   hydrochlorothiazide (HYDRODIURIL) 25 mg tablet Take 25 mg by mouth daily. Yes Historical Provider   doxazosin (CARDURA) 4 mg tablet Take 1 mg by mouth two (2) times a day. Yes Historical Provider     Patient Instructions   Change Coumadin dose to Take one and a half tablets, 7.5 mg on Tuesday and Thursday Saturday. Take one tablet, 5mg all other days. Return on Friday 4/14/17 to recheck level.     She voiced understanding of all instructions and AVS given

## 2017-04-05 NOTE — MR AVS SNAPSHOT
Visit Information Date & Time Provider Department Dept. Phone Encounter #  
 4/5/2017  9:00 AM Nikhil [address] [phone] [id number] Your Appointments 9/29/2017  9:15 AM  
ROUTINE CARE with Aaron Officer, MD Melody Garcialaz [address] ([address]) Appt Note: 6 month f/u bp $0cp [facility] [address], [address] [place] [address]  
[phone]  
  
   
 [address], [place] [address] [address] Upcoming Health Maintenance Date Due  
 GLAUCOMA SCREENING Q2Y 5/27/2017 MEDICARE YEARLY EXAM 3/24/2018 DTaP/Tdap/Td series (2 - Td) 10/22/2024 Allergies as of 4/5/2017  Review Complete On: 4/5/2017 By: Regan Medley LPN No Known Allergies Current Immunizations  Reviewed on 3/23/2017 Name Date Influenza High Dose Vaccine PF 9/22/2016, 10/20/2015, 10/22/2014 Influenza Vaccine 12/6/2013 Pneumococcal Conjugate (PCV-13) 3/23/2017 Pneumococcal Polysaccharide (PPSV-23) 12/6/2013 Tdap 10/22/2014 Not reviewed this visit You Were Diagnosed With   
  
 Codes Comments [facility]    -  Primary ICD-10-CM: I27.2 ICD-9-CM: 416.8 Chronic atrial fibrillation (HCC)     ICD-10-CM: D34.2 ICD-9-CM: 427.31 Vitals BP Pulse Height(growth percentile) Weight(growth percentile) SpO2 BMI  
 123/56 (BP 1 Location: Left arm, BP Patient Position: Sitting) 77 5' 3\" (1.6 m) 150 lb (68 kg) 97% 26.57 kg/m2 OB Status Smoking Status Postmenopausal Never Smoker BMI and BSA Data Body Mass Index Body Surface Area  
 26.57 kg/m 2 1.74 m 2 Preferred Pharmacy Pharmacy Name Phone [facility], [address] [phone] Your Updated Medication List  
  
   
This list is accurate as of: 4/5/17  9:31 AM.  Always use your most recent med list. amLODIPine 5 mg tablet Commonly known as:  Karane Pupa Take 1 Tab by mouth daily. CARDURA 4 mg tablet Generic drug:  doxazosin Take 1 mg by mouth two (2) times a day. COUMADIN 5 mg tablet Generic drug:  warfarin Take 5 mg by mouth daily. Take one and a half tablets, 7.5 mg on Tuesday and Thursday Saturday. Take one tablet, 5mg all other days. flecainide 100 mg tablet Commonly known as:  TAMBOCOR Take 1 Tab by mouth three (3) times daily. hydroCHLOROthiazide 25 mg tablet Commonly known as:  HYDRODIURIL Take 25 mg by mouth daily. levothyroxine 50 mcg tablet Commonly known as:  SYNTHROID  
TAKE 1 TABLET BY MOUTH EVERY MORNING BEFORE BREAKFAST FOR THYROID We Performed the Following AMB POC PT/INR [81348 CPT(R)] Patient Instructions Change Coumadin dose to Take one and a half tablets, 7.5 mg on Tuesday and Thursday Saturday. Take one tablet, 5mg all other days. Return on Friday 4/14/17 to recheck level. Introducing [facility] & HEALTH SERVICES! Juan Arizmendi introduces [employer] patient portal. Now you can access parts of your medical record, email your doctor's office, and request medication refills online. 1. In your internet browser, go to [email or web address] 2. Click on the First Time User? Click Here link in the Sign In box. You will see the New Member Sign Up page. 3. Enter your [employer] Access Code exactly as it appears below. You will not need to use this code after youve completed the sign-up process. If you do not sign up before the expiration date, you must request a new code. · [employer] Access Code: [email or web address] Expires: 5/4/2017  3:45 PM 
 
4. Enter the last four digits of your Social Security Number (xxxx) and Date of Birth (mm/dd/yyyy) as indicated and click Submit. You will be taken to the next sign-up page. 5. Create a [employer] ID.  This will be your [employer] login ID and cannot be changed, so think of one that is secure and easy to remember. 6. Create a [employer] password. You can change your password at any time. 7. Enter your Password Reset Question and Answer. This can be used at a later time if you forget your password. 8. Enter your e-mail address. You will receive e-mail notification when new information is available in [address] 9. Click Sign Up. You can now view and download portions of your medical record. 10. Click the Download Summary menu link to download a portable copy of your medical information. If you have questions, please visit the Frequently Asked Questions section of the [employer] website. Remember, [employer] is NOT to be used for urgent needs. For medical emergencies, dial 911. Now available from your iPhone and Android! Please provide this summary of care documentation to your next provider. Your primary care clinician is listed as Kirti Matty. If you have any questions after today's visit, please call [phone].

## 2017-04-05 NOTE — PATIENT INSTRUCTIONS
Change Coumadin dose to Take one and a half tablets, 7.5 mg on Tuesday and Thursday Saturday. Take one tablet, 5mg all other days. Return on Friday 4/14/17 to recheck level.

## 2017-04-20 ENCOUNTER — CLINICAL SUPPORT (OUTPATIENT)
Dept: FAMILY MEDICINE CLINIC | Age: 82
End: 2017-04-20

## 2017-04-20 VITALS
TEMPERATURE: 98.5 F | HEIGHT: 63 IN | HEART RATE: 71 BPM | BODY MASS INDEX: 26.4 KG/M2 | OXYGEN SATURATION: 98 % | WEIGHT: 149 LBS | DIASTOLIC BLOOD PRESSURE: 64 MMHG | SYSTOLIC BLOOD PRESSURE: 150 MMHG

## 2017-04-20 DIAGNOSIS — Z51.81 ANTICOAGULATION GOAL OF INR 2 TO 3: ICD-10-CM

## 2017-04-20 DIAGNOSIS — I48.20 CHRONIC ATRIAL FIBRILLATION (HCC): ICD-10-CM

## 2017-04-20 DIAGNOSIS — Z79.01 ANTICOAGULATION GOAL OF INR 2 TO 3: ICD-10-CM

## 2017-04-20 DIAGNOSIS — I27.20 PULMONARY HYPERTENSION (HCC): Primary | ICD-10-CM

## 2017-04-20 LAB
INR BLD: 19.5
PT POC: 1.6 SEC
VALID INTERNAL CONTROL?: YES

## 2017-04-20 NOTE — PROGRESS NOTES
Patient presents for a PT/INR  See med list and patient instructions along with results for verbal orders by Dr. Juvenal Sim  Results for orders placed or performed in visit on 04/20/17   AMB POC PT/INR   Result Value Ref Range    VALID INTERNAL CONTROL POC Yes     Prothrombin time (POC) 1.6 sec    INR POC 19.5      Vitals:    04/20/17 0917 04/20/17 0938   BP: 160/64 150/64   Pulse: 71    Temp: 98.5 °F (36.9 °C)    SpO2: 98%    Weight: 149 lb (67.6 kg)    Height: 5' 3\" (1.6 m)      Prior to Admission medications    Medication Sig Start Date End Date Taking? Authorizing Provider   warfarin (COUMADIN) 5 mg tablet Take 5 mg by mouth daily. Take one and a half tablets, 7.5 mg on Tuesday and Thursday Saturday, and Sunday Take one tablet, 5mg all other days. Yes Historical Provider   amLODIPine (NORVASC) 5 mg tablet Take 1 Tab by mouth daily. 1/5/17  Yes Aaron Officer, MD   flecainide (TAMBOCOR) 100 mg tablet Take 1 Tab by mouth three (3) times daily. 9/30/16  Yes Aaron Officer, MD   levothyroxine (SYNTHROID) 50 mcg tablet TAKE 1 TABLET BY MOUTH EVERY MORNING BEFORE BREAKFAST FOR THYROID 6/22/16  Yes Aaron Officer, MD   hydrochlorothiazide (HYDRODIURIL) 25 mg tablet Take 25 mg by mouth daily. Yes Historical Provider   doxazosin (CARDURA) 4 mg tablet Take 1 mg by mouth two (2) times a day. Yes Historical Provider     Patient Instructions   Increase Coumadin to Take one and a half tablets, 7.5 mg on Tuesday and Thursday Saturday, and Sunday Take one tablet, 5mg all other days. Return in 2 weeks. She voiced understanding of all instructions. BP readings reviewed with Dr. Juvenal Sim with no new orders at this time.

## 2017-04-20 NOTE — MR AVS SNAPSHOT
Visit Information Date & Time Provider Department Dept. Phone Encounter #  
 4/20/2017  9:00 AM [address] [phone] [id number] Follow-up Instructions Return in about 2 weeks (around 5/4/2017). Your Appointments 9/29/2017  9:15 AM  
ROUTINE CARE with MD Ivon Kent. [address] ([facility]) Appt Note: 6 month f/u bp $0cp wmc [address], [address] [employer] [address] [facility] [address]  
[phone]  
  
   
 [address], [address] [address] Upcoming Health Maintenance Date Due  
 GLAUCOMA SCREENING Q2Y 5/27/2017 MEDICARE YEARLY EXAM 3/24/2018 DTaP/Tdap/Td series (2 - Td) 10/22/2024 Allergies as of 4/20/2017  Review Complete On: 4/20/2017 By: Mic Kelsey LPN No Known Allergies Current Immunizations  Reviewed on 3/23/2017 Name Date Influenza High Dose Vaccine PF 9/22/2016, 10/20/2015, 10/22/2014 Influenza Vaccine 12/6/2013 Pneumococcal Conjugate (PCV-13) 3/23/2017 Pneumococcal Polysaccharide (PPSV-23) 12/6/2013 Tdap 10/22/2014 Not reviewed this visit You Were Diagnosed With   
  
 Codes Comments Pulmonary hypertension ([facility] [address]    -  Primary ICD-10-CM: I27.2 ICD-9-CM: 416.8 Chronic atrial fibrillation (HCC)     ICD-10-CM: E05.8 ICD-9-CM: 427.31 Anticoagulation goal of INR 2 to 3     ICD-10-CM: Z51.81, Z79.01 
ICD-9-CM: V58.83, V58.61 Vitals BP Pulse Temp Height(growth percentile) Weight(growth percentile) SpO2  
 150/64 (BP 1 Location: Right arm, BP Patient Position: Sitting) 71 98.5 °F (36.9 °C) 5' 3\" (1.6 m) 149 lb (67.6 kg) 98% BMI OB Status Smoking Status 26.39 kg/m2 Postmenopausal Never Smoker Vitals History BMI and BSA Data Body Mass Index Body Surface Area  
 26.39 kg/m 2 1.73 m 2 Preferred Pharmacy Pharmacy Name Phone [address], [address] [phone] Your Updated Medication List  
  
   
This list is accurate as of: 4/20/17  9:42 AM.  Always use your most recent med list. amLODIPine 5 mg tablet Commonly known as:  Etelvina Dural Take 1 Tab by mouth daily. CARDURA 4 mg tablet Generic drug:  doxazosin Take 1 mg by mouth two (2) times a day. COUMADIN 5 mg tablet Generic drug:  warfarin Take 5 mg by mouth daily. Take one and a half tablets, 7.5 mg on Tuesday and Thursday Saturday, and Sunday Take one tablet, 5mg all other days. flecainide 100 mg tablet Commonly known as:  TAMBOCOR Take 1 Tab by mouth three (3) times daily. hydroCHLOROthiazide 25 mg tablet Commonly known as:  HYDRODIURIL Take 25 mg by mouth daily. levothyroxine 50 mcg tablet Commonly known as:  SYNTHROID  
TAKE 1 TABLET BY MOUTH EVERY MORNING BEFORE BREAKFAST FOR THYROID We Performed the Following AMB POC PT/INR [05331 CPT(R)] Follow-up Instructions Return in about 2 weeks (around 5/4/2017). Patient Instructions Increase Coumadin to Take one and a half tablets, 7.5 mg on Tuesday and Thursday Saturday, and Sunday Take one tablet, 5mg all other days. Return in 2 weeks. Introducing [facility] & HEALTH SERVICES! Quinten Kelsey introduces We[employer] patient portal. Now you can access parts of your medical record, email your doctor's office, and request medication refills online. 1. In your internet browser, go to [email or web address] 2. Click on the First Time User? Click Here link in the Sign In box. You will see the New Member Sign Up page. 3. Enter your WePlann Access Code exactly as it appears below. You will not need to use this code after youve completed the sign-up process. If you do not sign up before the expiration date, you must request a new code. · WePlann Access Code: [email or web address] Expires: 5/4/2017  3:45 PM 
 
4. Enter the last four digits of your Social Security Number (xxxx) and Date of Birth (mm/dd/yyyy) as indicated and click Submit. You will be taken to the next sign-up page. 5. Create a Mobile365 (fka InphoMatch) ID. This will be your Mobile365 (fka InphoMatch) login ID and cannot be changed, so think of one that is secure and easy to remember. 6. Create a Mobile365 (fka InphoMatch) password. You can change your password at any time. 7. Enter your Password Reset Question and Answer. This can be used at a later time if you forget your password. 8. Enter your e-mail address. You will receive e-mail notification when new information is available in [address] 9. Click Sign Up. You can now view and download portions of your medical record. 10. Click the Download Summary menu link to download a portable copy of your medical information. If you have questions, please visit the Frequently Asked Questions section of the Mobile365 (fka InphoMatch) website. Remember, Mobile365 (fka InphoMatch) is NOT to be used for urgent needs. For medical emergencies, dial 911. Now available from your iPhone and Android! Please provide this summary of care documentation to your next provider. Your primary care clinician is listed as Hero Landis. If you have any questions after today's visit, please call [phone].

## 2017-04-20 NOTE — PATIENT INSTRUCTIONS
Increase Coumadin to Take one and a half tablets, 7.5 mg on Tuesday and Thursday Saturday, and Sunday Take one tablet, 5mg all other days. Return in 2 weeks.

## 2017-05-01 ENCOUNTER — TELEPHONE (OUTPATIENT)
Dept: FAMILY MEDICINE CLINIC | Age: 82
End: 2017-05-01

## 2017-05-01 RX ORDER — METHYLPREDNISOLONE 4 MG/1
TABLET ORAL
Qty: 1 DOSE PACK | Refills: 0 | Status: SHIPPED | OUTPATIENT
Start: 2017-05-01 | End: 2017-05-05 | Stop reason: ALTCHOICE

## 2017-05-01 NOTE — TELEPHONE ENCOUNTER
Message left for patient that script has been sent to [facility].

## 2017-05-01 NOTE — TELEPHONE ENCOUNTER
Pt has poison ivy. She has been using hydrocortisone with minimal relief. Can a steroid pack be sent to [facility] or should pt be evaluated first? Please advise! ?

## 2017-05-05 ENCOUNTER — CLINICAL SUPPORT (OUTPATIENT)
Dept: FAMILY MEDICINE CLINIC | Age: 82
End: 2017-05-05

## 2017-05-05 VITALS
OXYGEN SATURATION: 96 % | SYSTOLIC BLOOD PRESSURE: 151 MMHG | WEIGHT: 148 LBS | BODY MASS INDEX: 26.22 KG/M2 | DIASTOLIC BLOOD PRESSURE: 63 MMHG | HEIGHT: 63 IN | HEART RATE: 79 BPM

## 2017-05-05 DIAGNOSIS — Z79.01 ANTICOAGULATION GOAL OF INR 2 TO 3: ICD-10-CM

## 2017-05-05 DIAGNOSIS — I48.20 CHRONIC ATRIAL FIBRILLATION (HCC): Primary | ICD-10-CM

## 2017-05-05 DIAGNOSIS — Z51.81 ANTICOAGULATION GOAL OF INR 2 TO 3: ICD-10-CM

## 2017-05-05 LAB
INR BLD: 2.1
PT POC: 25.3 SEC
VALID INTERNAL CONTROL?: YES

## 2017-05-05 NOTE — PATIENT INSTRUCTIONS
Continue Coumadin 5 mg tablet, Take one and a half tablets, 7.5 mg on Tuesday and Thursday Saturday, and Sunday Take one tablet, 5mg all other days.

## 2017-05-05 NOTE — PROGRESS NOTES
Patient presents for a PT/INR  See med list and patient instructions along with results for verbal orders by Dr. Linh Peraza. Results for orders placed or performed in visit on 05/05/17   AMB POC PT/INR   Result Value Ref Range    VALID INTERNAL CONTROL POC Yes     Prothrombin time (POC) 25.3 sec    INR POC 2.1      Vitals:    05/05/17 0905   BP: 151/63   Pulse: 79   SpO2: 96%   Weight: 148 lb (67.1 kg)   Height: 5' 3\" (1.6 m)     Prior to Admission medications    Medication Sig Start Date End Date Taking? Authorizing Provider   warfarin (COUMADIN) 5 mg tablet Take 5 mg by mouth daily. Take one and a half tablets, 7.5 mg on Tuesday and Thursday Saturday, and Sunday Take one tablet, 5mg all other days. Yes Historical Provider   amLODIPine (NORVASC) 5 mg tablet Take 1 Tab by mouth daily. 1/5/17  Yes Jeanne Vargas MD   flecainide (TAMBOCOR) 100 mg tablet Take 1 Tab by mouth three (3) times daily. 9/30/16  Yes Jeanne Vargas MD   levothyroxine (SYNTHROID) 50 mcg tablet TAKE 1 TABLET BY MOUTH EVERY MORNING BEFORE BREAKFAST FOR THYROID 6/22/16  Yes Jeanne Vargas MD   hydrochlorothiazide (HYDRODIURIL) 25 mg tablet Take 25 mg by mouth daily. Yes Historical Provider   doxazosin (CARDURA) 4 mg tablet Take 1 mg by mouth two (2) times a day. Yes Historical Provider     Patient Instructions   Continue Coumadin 5 mg tablet, Take one and a half tablets, 7.5 mg on Tuesday and Thursday Saturday, and Sunday Take one tablet, 5mg all other days.     She voiced understanding of all instructions

## 2017-05-09 RX ORDER — FLECAINIDE ACETATE 100 MG/1
100 TABLET ORAL 3 TIMES DAILY
Qty: 270 TAB | Refills: 1 | Status: SHIPPED | OUTPATIENT
Start: 2017-05-09 | End: 2017-12-18 | Stop reason: SDUPTHER

## 2017-05-22 RX ORDER — WARFARIN SODIUM 5 MG/1
TABLET ORAL
Qty: 180 TAB | Refills: 3 | Status: SHIPPED | OUTPATIENT
Start: 2017-05-22 | End: 2017-06-02 | Stop reason: DRUGHIGH

## 2017-05-30 DIAGNOSIS — J40 BRONCHITIS: ICD-10-CM

## 2017-05-30 RX ORDER — HYDROCHLOROTHIAZIDE 25 MG/1
25 TABLET ORAL DAILY
Qty: 90 TAB | Refills: 3 | Status: SHIPPED | OUTPATIENT
Start: 2017-05-30 | End: 2018-06-12 | Stop reason: SDUPTHER

## 2017-06-02 ENCOUNTER — CLINICAL SUPPORT (OUTPATIENT)
Dept: FAMILY MEDICINE CLINIC | Age: 82
End: 2017-06-02

## 2017-06-02 VITALS
OXYGEN SATURATION: 96 % | DIASTOLIC BLOOD PRESSURE: 54 MMHG | HEIGHT: 63 IN | HEART RATE: 73 BPM | SYSTOLIC BLOOD PRESSURE: 132 MMHG | WEIGHT: 147 LBS | BODY MASS INDEX: 26.05 KG/M2

## 2017-06-02 DIAGNOSIS — I48.20 CHRONIC ATRIAL FIBRILLATION (HCC): Primary | ICD-10-CM

## 2017-06-02 DIAGNOSIS — Z51.81 ANTICOAGULATION GOAL OF INR 2 TO 3: ICD-10-CM

## 2017-06-02 DIAGNOSIS — Z79.01 ANTICOAGULATION GOAL OF INR 2 TO 3: ICD-10-CM

## 2017-06-02 LAB
INR BLD: 2.1
PT POC: 25.3 SECONDS
VALID INTERNAL CONTROL?: YES

## 2017-06-02 NOTE — MR AVS SNAPSHOT
Visit Information Date & Time Provider Department Dept. Phone Encounter #  
 6/2/2017 10:00 AM [address] [phone] [id number] Follow-up Instructions Return in about 1 month (around 7/2/2017). Your Appointments 9/29/2017  9:15 AM  
ROUTINE CARE with MD Melody Longo [address] ([address]) Appt Note: 6 month f/u bp $0cp wmc [address], [address] Efra Nhan [address] [facility] [address]  
[phone]  
  
   
 [address], [place] [address] [address] Upcoming Health Maintenance Date Due  
 GLAUCOMA SCREENING Q2Y 5/27/2017 INFLUENZA AGE 9 TO ADULT 8/1/2017 MEDICARE YEARLY EXAM 3/24/2018 DTaP/Tdap/Td series (2 - Td) 10/22/2024 Allergies as of 6/2/2017  Review Complete On: 6/2/2017 By: Ramona Navarro LPN No Known Allergies Current Immunizations  Reviewed on 3/23/2017 Name Date Influenza High Dose Vaccine PF 9/22/2016, 10/20/2015, 10/22/2014 Influenza Vaccine 12/6/2013 Pneumococcal Conjugate (PCV-13) 3/23/2017 Pneumococcal Polysaccharide (PPSV-23) 12/6/2013 Tdap 10/22/2014 Not reviewed this visit You Were Diagnosed With   
  
 Codes Comments Chronic atrial fibrillation (HCC)    -  Primary ICD-10-CM: F48.6 ICD-9-CM: 427.31 Anticoagulation goal of INR 2 to 3     ICD-10-CM: Z51.81, Z79.01 
ICD-9-CM: V58.83, V58.61 Vitals BP Pulse Height(growth percentile) Weight(growth percentile) SpO2 BMI  
 132/54 (BP 1 Location: Right arm, BP Patient Position: Sitting) 73 5' 3\" (1.6 m) 147 lb (66.7 kg) 96% 26.04 kg/m2 OB Status Smoking Status Postmenopausal Never Smoker BMI and BSA Data Body Mass Index Body Surface Area 26.04 kg/m 2 1.72 m 2 Preferred Pharmacy Pharmacy Name Phone [address], [address] [phone] Your Updated Medication List  
  
   
 This list is accurate as of: 6/2/17 10:11 AM.  Always use your most recent med list. amLODIPine 5 mg tablet Commonly known as:  [employer] Take 1 Tab by mouth daily. CARDURA 4 mg tablet Generic drug:  doxazosin Take 1 mg by mouth two (2) times a day. COUMADIN 5 mg tablet Generic drug:  warfarin Take 5 mg by mouth daily. Take one and a half tablets, 7.5 mg on Tuesday and Thursday Saturday, and Sunday Take one tablet, 5mg all other days. flecainide 100 mg tablet Commonly known as:  TAMBOCOR Take 1 Tab by mouth three (3) times daily. hydroCHLOROthiazide 25 mg tablet Commonly known as:  HYDRODIURIL Take 1 Tab by mouth daily. levothyroxine 50 mcg tablet Commonly known as:  SYNTHROID  
TAKE 1 TABLET BY MOUTH EVERY MORNING BEFORE BREAKFAST FOR THYROID We Performed the Following AMB POC PT/INR [72606 CPT(R)] Follow-up Instructions Return in about 1 month (around 7/2/2017). Patient Instructions Continue coumadin 5 mg tablet Take one and a half tablets, 7.5 mg on Tuesday and Thursday Saturday, and Sunday Take one tablet, 5mg all other days. Recheck level in one month. Introducing [facility] & HEALTH SERVICES! Manuel Steen introduces [employer] patient portal. Now you can access parts of your medical record, email your doctor's office, and request medication refills online. 1. In your internet browser, go to [email or web address] 2. Click on the First Time User? Click Here link in the Sign In box. You will see the New Member Sign Up page. 3. Enter your Van[employer] Forums Access Code exactly as it appears below. You will not need to use this code after youve completed the sign-up process. If you do not sign up before the expiration date, you must request a new code. · [employer] For[employer] Access Code: [email or web address] Expires: 8/3/2017  9:49 AM 
 
4.  Enter the last four digits of your Social Security Number (xxxx) and Date of Birth (mm/dd/yyyy) as indicated and click Submit. You will be taken to the next sign-up page. 5. Create a Hornet Networks ID. This will be your [employer] login ID and cannot be changed, so think of one that is secure and easy to remember. 6. Create a Horn[employer] Networks password. You can change your password at any time. 7. Enter your Password Reset Question and Answer. This can be used at a later time if you forget your password. 8. Enter your e-mail address. You will receive e-mail notification when new information is available in [address] 9. Click Sign Up. You can now view and download portions of your medical record. 10. Click the Download Summary menu link to download a portable copy of your medical information. If you have questions, please visit the Frequently Asked Questions section of the [employer] website. Remember, [employer] is NOT to be used for urgent needs. For medical emergencies, dial 911. Now available from your iPhone and Android! Please provide this summary of care documentation to your next provider. Your primary care clinician is listed as Aaron Officer. If you have any questions after today's visit, please call [phone].

## 2017-06-02 NOTE — PATIENT INSTRUCTIONS
Continue coumadin 5 mg tablet Take one and a half tablets, 7.5 mg on Tuesday and Thursday Saturday, and Sunday Take one tablet, 5mg all other days. Recheck level in one month.

## 2017-06-02 NOTE — PROGRESS NOTES
Patient presents for a PT/INR  See med list and patient instructions along with results for verbal orders by Dr. Renae Ventura. Results for orders placed or performed in visit on 06/02/17   AMB POC PT/INR   Result Value Ref Range    VALID INTERNAL CONTROL POC Yes     Prothrombin time (POC) 25.3 seconds    INR POC 2.1      Vitals:    06/02/17 1001   BP: 132/54   Pulse: 73   SpO2: 96%   Weight: 147 lb (66.7 kg)   Height: 5' 3\" (1.6 m)     Prior to Admission medications    Medication Sig Start Date End Date Taking? Authorizing Provider   hydroCHLOROthiazide (HYDRODIURIL) 25 mg tablet Take 1 Tab by mouth daily. 5/30/17  Yes Latosha Fernández MD   flecainide (TAMBOCOR) 100 mg tablet Take 1 Tab by mouth three (3) times daily. 5/9/17  Yes Paula Noguera NP   warfarin (COUMADIN) 5 mg tablet Take 5 mg by mouth daily. Take one and a half tablets, 7.5 mg on Tuesday and Thursday Saturday, and Sunday Take one tablet, 5mg all other days. Yes Historical Provider   amLODIPine (NORVASC) 5 mg tablet Take 1 Tab by mouth daily. 1/5/17  Yes Latosha Fernández MD   levothyroxine (SYNTHROID) 50 mcg tablet TAKE 1 TABLET BY MOUTH EVERY MORNING BEFORE BREAKFAST FOR THYROID 6/22/16  Yes Latosha Fernández MD   doxazosin (CARDURA) 4 mg tablet Take 1 mg by mouth two (2) times a day. Yes Historical Provider     Patient Instructions   Continue coumadin 5 mg tablet Take one and a half tablets, 7.5 mg on Tuesday and Thursday Saturday, and Sunday Take one tablet, 5mg all other days. Recheck level in one month. She voiced understanding of all instructions and AVS given.

## 2017-06-20 RX ORDER — LEVOTHYROXINE SODIUM 50 UG/1
TABLET ORAL
Qty: 90 TAB | Refills: 3 | Status: SHIPPED | OUTPATIENT
Start: 2017-06-20 | End: 2018-06-13 | Stop reason: SDUPTHER

## 2017-07-03 ENCOUNTER — CLINICAL SUPPORT (OUTPATIENT)
Dept: FAMILY MEDICINE CLINIC | Age: 82
End: 2017-07-03

## 2017-07-03 VITALS
HEART RATE: 75 BPM | OXYGEN SATURATION: 96 % | WEIGHT: 148 LBS | BODY MASS INDEX: 26.22 KG/M2 | SYSTOLIC BLOOD PRESSURE: 113 MMHG | HEIGHT: 63 IN | DIASTOLIC BLOOD PRESSURE: 55 MMHG

## 2017-07-03 DIAGNOSIS — I48.20 CHRONIC ATRIAL FIBRILLATION (HCC): Primary | ICD-10-CM

## 2017-07-03 DIAGNOSIS — I27.20 PULMONARY HYPERTENSION (HCC): ICD-10-CM

## 2017-07-03 LAB
INR BLD: 1.9
PT POC: 22.7 SECONDS
VALID INTERNAL CONTROL?: YES

## 2017-07-03 NOTE — PROGRESS NOTES
Patient presents for a PT/INR  See med list and patient instructions along with results for verbal orders by Dr. Ivy Cortes  Results for orders placed or performed in visit on 07/03/17   AMB POC PT/INR   Result Value Ref Range    VALID INTERNAL CONTROL POC Yes     Prothrombin time (POC) 22.7 seconds    INR POC 1.9      Vitals:    07/03/17 0952   BP: 113/55   Pulse: 75   SpO2: 96%   Weight: 148 lb (67.1 kg)   Height: 5' 3\" (1.6 m)     Prior to Admission medications    Medication Sig Start Date End Date Taking? Authorizing Provider   levothyroxine (SYNTHROID) 50 mcg tablet TAKE 1 TABLET BY MOUTH EVERY MORNING BEFORE BREAKFAST FOR THYROID 6/20/17  Yes Savita Krueger MD   hydroCHLOROthiazide (HYDRODIURIL) 25 mg tablet Take 1 Tab by mouth daily. 5/30/17  Yes Savita Krueger MD   flecainide (TAMBOCOR) 100 mg tablet Take 1 Tab by mouth three (3) times daily. 5/9/17  Yes Paula Noguera NP   warfarin (COUMADIN) 5 mg tablet Take 5 mg by mouth daily. Take one and a half tablets, 7.5 mg on Tuesday and Thursday Saturday, and Sunday Take one tablet, 5mg all other days. Yes Historical Provider   amLODIPine (NORVASC) 5 mg tablet Take 1 Tab by mouth daily. 1/5/17  Yes Savita Krueger MD   doxazosin (CARDURA) 4 mg tablet Take 1 mg by mouth two (2) times a day.    Yes Historical Provider       She voiced understanding of all instructions and AVS printed with Anti-coag calander

## 2017-07-03 NOTE — MR AVS SNAPSHOT
Visit Information Date & Time Provider Department Dept. Phone Encounter #  
 7/3/2017 10:00 AM [address] [phone] [id number] Your Appointments 9/29/2017  9:15 AM  
ROUTINE CARE with Edna Bloch, MD [address] ([address]) Appt Note: 6 month f/u bp $0cp wmc [address], [address] [place] [address]  
[phone]  
  
   
 [address], [address] [address] Upcoming Health Maintenance Date Due  
 GLAUCOMA SCREENING Q2Y 5/27/2017 INFLUENZA AGE 9 TO ADULT 8/1/2017 MEDICARE YEARLY EXAM 3/24/2018 DTaP/Tdap/Td series (2 - Td) 10/22/2024 Allergies as of 7/3/2017  Review Complete On: 7/3/2017 By: Essie Perdomo LPN No Known Allergies Current Immunizations  Reviewed on 3/23/2017 Name Date Influenza High Dose Vaccine PF 9/22/2016, 10/20/2015, 10/22/2014 Influenza Vaccine 12/6/2013 Pneumococcal Conjugate (PCV-13) 3/23/2017 Pneumococcal Polysaccharide (PPSV-23) 12/6/2013 Tdap 10/22/2014 Not reviewed this visit You Were Diagnosed With   
  
 Codes Comments Chronic atrial fibrillation (HCC)    -  Primary ICD-10-CM: Q52.3 ICD-9-CM: 427.31 [facility]     ICD-10-CM: I27.2 ICD-9-CM: 416.8 Vitals BP Pulse Height(growth percentile) Weight(growth percentile) SpO2 BMI  
 113/55 (BP 1 Location: Right arm, BP Patient Position: Sitting) 75 5' 3\" (1.6 m) 148 lb (67.1 kg) 96% 26.22 kg/m2 OB Status Smoking Status Postmenopausal Never Smoker BMI and BSA Data Body Mass Index Body Surface Area  
 26.22 kg/m 2 1.73 m 2 Preferred Pharmacy Pharmacy Name Phone [address], [address] [phone] Your Updated Medication List  
  
   
This list is accurate as of: 7/3/17 10:13 AM.  Always use your most recent med list.  
  
  
  
  
 amLODIPine 5 mg tablet Commonly known as:  Dari Chimes Take 1 Tab by mouth daily. CARDURA 4 mg tablet Generic drug:  doxazosin Take 1 mg by mouth two (2) times a day. COUMADIN 5 mg tablet Generic drug:  warfarin Take 5 mg by mouth daily. Take one and a half tablets, 7.5 mg on Tuesday and Thursday Saturday, and Sunday Take one tablet, 5mg all other days. flecainide 100 mg tablet Commonly known as:  TAMBOCOR Take 1 Tab by mouth three (3) times daily. hydroCHLOROthiazide 25 mg tablet Commonly known as:  HYDRODIURIL Take 1 Tab by mouth daily. levothyroxine 50 mcg tablet Commonly known as:  SYNTHROID  
TAKE 1 TABLET BY MOUTH EVERY MORNING BEFORE BREAKFAST FOR THYROID July 2017 Details [place] Curling Tue Wed Thu Fri Sat  
        1  
  
  
  
  
  2  
  
  
  
   3  
  
5 mg See details 4  
  
7.5 mg  
  
   5  
  
5 mg  
  
   6  
  
7.5 mg  
  
   7  
  
5 mg  
  
   8  
  
7.5 mg  
  
  
  9  
  
7.5 mg  
  
   10  
  
5 mg  
  
   11  
  
7.5 mg  
  
   12  
  
5 mg  
  
   13  
  
7.5 mg  
  
   14  
  
5 mg  
  
   15  
  
7.5 mg  
  
  
  16  
  
7.5 mg  
  
   17  
  
5 mg  
  
   18  
  
  
  
   19  
  
  
  
   20  
  
  
  
   21  
  
  
  
   22  
  
  
  
  
  23  
  
  
  
   24  
  
  
  
   25  
  
  
  
   26  
  
  
  
   27  
  
  
  
   28  
  
  
  
   29  
  
  
  
  
  30  
  
  
  
   31  
  
  
  
       
 Date Details 07/03 This INR check INR: 1.9 Date of next INR:  7/17/2017 How to take your warfarin dose To take:  5 mg Take one of the 5 mg tablets. To take:  7.5 mg Take one and a half of the 5 mg tablets. We Performed the Following AMB POC PT/INR [44749 CPT(R)] Introducing [facility] SERVICES! Kana He introduces [employer] patient portal. Now you can access parts of your medical record, email your doctor's office, and request medication refills online.    
 
1. In your internet browser, go to [email or web address] 2. Click on the First Time User? Click Here link in the Sign In box. You will see the New Member Sign Up page. 3. Enter your Test.tv Access Code exactly as it appears below. You will not need to use this code after youve completed the sign-up process. If you do not sign up before the expiration date, you must request a new code. · [email or web address].tv Access Code: [email or web address] Expires: 8/3/2017  9:49 AM 
 
4. Enter the last four digits of your Social Security Number (xxxx) and Date of Birth (mm/dd/yyyy) as indicated and click Submit. You will be taken to the next sign-up page. 5. Create a Mangstort ID. This will be your Test.tv login ID and cannot be changed, so think of one that is secure and easy to remember. 6. Create a Test.tv password. You can change your password at any time. 7. Enter your Password Reset Question and Answer. This can be used at a later time if you forget your password. 8. Enter your e-mail address. You will receive e-mail notification when new information is available in [address] 9. Click Sign Up. You can now view and download portions of your medical record. 10. Click the Download Summary menu link to download a portable copy of your medical information. If you have questions, please visit the Frequently Asked Questions section of the Test.tv website. Remember, Test.tv is NOT to be used for urgent needs. For medical emergencies, dial 911. Now available from your iPhone and Android! Please provide this summary of care documentation to your next provider. Your primary care clinician is listed as Ehsan Milner. If you have any questions after today's visit, please call [phone].

## 2017-07-17 ENCOUNTER — CLINICAL SUPPORT (OUTPATIENT)
Dept: FAMILY MEDICINE CLINIC | Age: 82
End: 2017-07-17

## 2017-07-17 VITALS
HEART RATE: 70 BPM | WEIGHT: 147 LBS | DIASTOLIC BLOOD PRESSURE: 60 MMHG | BODY MASS INDEX: 26.05 KG/M2 | OXYGEN SATURATION: 97 % | HEIGHT: 63 IN | SYSTOLIC BLOOD PRESSURE: 145 MMHG

## 2017-07-17 DIAGNOSIS — Z51.81 ANTICOAGULATION GOAL OF INR 2 TO 3: ICD-10-CM

## 2017-07-17 DIAGNOSIS — I48.20 CHRONIC ATRIAL FIBRILLATION (HCC): Primary | ICD-10-CM

## 2017-07-17 DIAGNOSIS — Z79.01 ANTICOAGULATION GOAL OF INR 2 TO 3: ICD-10-CM

## 2017-07-17 LAB
INR BLD: 2.4
PT POC: 28.4 SECONDS
VALID INTERNAL CONTROL?: YES

## 2017-07-17 NOTE — PROGRESS NOTES
Patient presents for a PT/INR  See med list and patient instructions along with results for verbal orders by Dr. Arsh Ordoñez. Results for orders placed or performed in visit on 07/17/17   AMB POC PT/INR   Result Value Ref Range    VALID INTERNAL CONTROL POC Yes     Prothrombin time (POC) 28.4 seconds    INR POC 2.4      Vitals:    07/17/17 1002   BP: 145/60   Pulse: 70   SpO2: 97%   Weight: 147 lb (66.7 kg)   Height: 5' 3\" (1.6 m)     Prior to Admission medications    Medication Sig Start Date End Date Taking? Authorizing Provider   levothyroxine (SYNTHROID) 50 mcg tablet TAKE 1 TABLET BY MOUTH EVERY MORNING BEFORE BREAKFAST FOR THYROID 6/20/17  Yes Gerson Robert MD   hydroCHLOROthiazide (HYDRODIURIL) 25 mg tablet Take 1 Tab by mouth daily. 5/30/17  Yes Gerson Robert MD   flecainide (TAMBOCOR) 100 mg tablet Take 1 Tab by mouth three (3) times daily. 5/9/17  Yes Paula Noguera NP   warfarin (COUMADIN) 5 mg tablet Take 5 mg by mouth daily. Take one and a half tablets, 7.5 mg on Tuesday and Thursday Saturday, and Sunday Take one tablet, 5mg all other days. Yes Historical Provider   amLODIPine (NORVASC) 5 mg tablet Take 1 Tab by mouth daily. 1/5/17  Yes Gerson Robert MD   doxazosin (CARDURA) 4 mg tablet Take 1 mg by mouth two (2) times a day. Yes Historical Provider     Instructions in Anticoag episode and printed for patient per Dr. Sean Arias verbal order. Patient voiced understanding of all instructions.

## 2017-07-17 NOTE — MR AVS SNAPSHOT
Visit Information Date & Time Provider Department Dept. Phone Encounter #  
 7/17/2017 10:00 AM [facility] [address] [phone] [id number] Your Appointments 9/29/2017  9:15 AM  
ROUTINE CARE with MD Melody Lang Tj [address] ([address]) Appt Note: 6 month f/u bp $0cp [facility] [address], [address] [place] [address]  
[phone]  
  
   
 [address], [address] [address] Upcoming Health Maintenance Date Due  
 GLAUCOMA SCREENING Q2Y 5/27/2017 INFLUENZA AGE 9 TO ADULT 8/1/2017 MEDICARE YEARLY EXAM 3/24/2018 DTaP/Tdap/Td series (2 - Td) 10/22/2024 Allergies as of 7/17/2017  Review Complete On: 7/17/2017 By: Ghulam Ragsdale LPN No Known Allergies Current Immunizations  Reviewed on 3/23/2017 Name Date Influenza High Dose Vaccine PF 9/22/2016, 10/20/2015, 10/22/2014 Influenza Vaccine 12/6/2013 Pneumococcal Conjugate (PCV-13) 3/23/2017 Pneumococcal Polysaccharide (PPSV-23) 12/6/2013 Tdap 10/22/2014 Not reviewed this visit You Were Diagnosed With   
  
 Codes Comments Chronic atrial fibrillation (HCC)    -  Primary ICD-10-CM: E56.7 ICD-9-CM: 427.31 Anticoagulation goal of INR 2 to 3     ICD-10-CM: Z51.81, Z79.01 
ICD-9-CM: V58.83, V58.61 Vitals BP Pulse Height(growth percentile) Weight(growth percentile) SpO2 BMI  
 145/60 (BP 1 Location: Left arm, BP Patient Position: Sitting) 70 5' 3\" (1.6 m) 147 lb (66.7 kg) 97% 26.04 kg/m2 OB Status Smoking Status Postmenopausal Never Smoker BMI and BSA Data Body Mass Index Body Surface Area 26.04 kg/m 2 1.72 m 2 Preferred Pharmacy Pharmacy Name Phone [facility], [address] [phone] Your Updated Medication List  
  
   
This list is accurate as of: 7/17/17 10:11 AM.  Always use your most recent med list. amLODIPine 5 mg tablet Commonly known as:  Maeve Feng Take 1 Tab by mouth daily. CARDURA 4 mg tablet Generic drug:  doxazosin Take 1 mg by mouth two (2) times a day. COUMADIN 5 mg tablet Generic drug:  warfarin Take 5 mg by mouth daily. Take one and a half tablets, 7.5 mg on Tuesday and Thursday Saturday, and Sunday Take one tablet, 5mg all other days. flecainide 100 mg tablet Commonly known as:  TAMBOCOR Take 1 Tab by mouth three (3) times daily. hydroCHLOROthiazide 25 mg tablet Commonly known as:  HYDRODIURIL Take 1 Tab by mouth daily. levothyroxine 50 mcg tablet Commonly known as:  SYNTHROID  
TAKE 1 TABLET BY MOUTH EVERY MORNING BEFORE BREAKFAST FOR THYROID Description Next appointment 8/14/17 at 10 am 
  
  
July 2017 Details Sun Mon Tue Wed Thu Fri Sat  
        1  
  
  
  
  
  2  
  
  
  
   3  
  
  
  
   4  
  
  
  
   5  
  
  
  
   6  
  
  
  
   7  
  
  
  
   8  
  
  
  
  
  9  
  
  
  
   10  
  
  
  
   11  
  
  
  
   12  
  
  
  
   13  
  
  
  
   14  
  
  
  
   15  
  
  
  
  
  16  
  
  
  
   17  
  
5 mg See details 18  
  
7.5 mg  
  
   19  
  
5 mg  
  
   20  
  
7.5 mg  
  
   21  
  
5 mg  
  
   22  
  
7.5 mg  
  
  
  23  
  
7.5 mg  
  
   24  
  
5 mg  
  
   25  
  
7.5 mg  
  
   26  
  
5 mg  
  
   27  
  
7.5 mg  
  
   28  
  
5 mg  
  
   29  
  
7.5 mg  
  
  
  30  
  
7.5 mg  
  
   31  
  
5 mg Date Details 07/17 This INR check INR: 2.4 How to take your warfarin dose To take:  5 mg Take one of the 5 mg tablets. To take:  7.5 mg Take one and a half of the 5 mg tablets. August 2017 Details Brett Jiémnez Tue Wed Thu Fri Sat  
    1  
  
7.5 mg  
  
   2  
  
5 mg  
  
   3  
  
7.5 mg  
  
   4  
  
5 mg  
  
   5  
  
7.5 mg  
  
  
  6  
  
7.5 mg  
  
   7  
  
5 mg  
  
   8  
  
7.5 mg  
  
   9  
  
 5 mg  
  
   10  
  
7.5 mg  
  
   11  
  
5 mg  
  
   12  
  
7.5 mg  
  
  
  13  
  
7.5 mg  
  
   14  
  
5 mg  
  
   15  
  
  
  
   16  
  
  
  
   17  
  
  
  
   18  
  
  
  
   19  
  
  
  
  
  20  
  
  
  
   21  
  
  
  
   22  
  
  
  
   23  
  
  
  
   24  
  
  
  
   25  
  
  
  
   26  
  
  
  
  
  27  
  
  
  
   28  
  
  
  
   29  
  
  
  
   30  
  
  
  
   31  
  
  
  
    
 Date Details No additional details Date of next INR:  8/14/2017 How to take your warfarin dose To take:  5 mg Take one of the 5 mg tablets. To take:  7.5 mg Take one and a half of the 5 mg tablets. We Performed the Following AMB POC PT/INR [00069 CPT(R)] Introducing [facility] & HE[facility] SERVICES! Monique Wiggins introduces [employer] patient portal. Now you can access parts of your medical record, email your doctor's office, and request medication refills online. 1. In your internet browser, go to [email or web address] 2. Click on the First Time User? Click Here link in the Sign In box. You will see the New Member Sign Up page. 3. Enter your [employer] Access Code exactly as it appears below. You will not need to use this code after youve completed the sign-up process. If you do not sign up before the expiration date, you must request a new code. · [employer] Access Code: [email or web address] Expires: 8/3/2017  9:49 AM 
 
4. Enter the last four digits of your Social Security Number (xxxx) and Date of Birth (mm/dd/yyyy) as indicated and click Submit. You will be taken to the next sign-up page. 5. Create a [employer] ID. This will be your [employer] login ID and cannot be changed, so think of one that is secure and easy to remember. 6. Create a [employer] password. You can change your password at any time. 7. Enter your Password Reset Question and Answer. This can be used at a later time if you forget your password. 8. Enter your e-mail address. You will receive e-mail notification when new information is available in [address] 9. Click Sign Up. You can now view and download portions of your medical record. 10. Click the Download Summary menu link to download a portable copy of your medical information. If you have questions, please visit the Frequently Asked Questions section of the [employer] website. Remember, [employer] is NOT to be used for urgent needs. For medical emergencies, dial 911. Now available from your iPhone and Android! Please provide this summary of care documentation to your next provider. Your primary care clinician is listed as Lucille Head. If you have any questions after today's visit, please call [phone].

## 2017-08-14 ENCOUNTER — CLINICAL SUPPORT (OUTPATIENT)
Dept: FAMILY MEDICINE CLINIC | Age: 82
End: 2017-08-14

## 2017-08-14 VITALS
WEIGHT: 145.6 LBS | RESPIRATION RATE: 14 BRPM | BODY MASS INDEX: 25.8 KG/M2 | HEART RATE: 77 BPM | SYSTOLIC BLOOD PRESSURE: 128 MMHG | OXYGEN SATURATION: 97 % | HEIGHT: 63 IN | DIASTOLIC BLOOD PRESSURE: 65 MMHG

## 2017-08-14 DIAGNOSIS — I48.20 CHRONIC ATRIAL FIBRILLATION (HCC): Primary | ICD-10-CM

## 2017-08-14 DIAGNOSIS — Z79.01 ANTICOAGULATION GOAL OF INR 2 TO 3: ICD-10-CM

## 2017-08-14 DIAGNOSIS — Z51.81 ANTICOAGULATION GOAL OF INR 2 TO 3: ICD-10-CM

## 2017-08-14 LAB
INR BLD: 2.3
PT POC: 27.8 SECONDS
VALID INTERNAL CONTROL?: YES

## 2017-08-14 NOTE — MR AVS SNAPSHOT
Visit Information Date & Time Provider Department Dept. Phone Encounter #  
 8/14/2017 10:00 AM Nik[facility] [address] [phone] [id number] Your Appointments 9/29/2017  9:15 AM  
ROUTINE CARE with MD Melody Lang Tj [address] ([address]) Appt Note: 6 month f/u bp $0cp [facility] [address], [address] [place] [address]  
[phone]  
  
   
 [address], [place] [address] [address] Upcoming Health Maintenance Date Due  
 GLAUCOMA SCREENING Q2Y 5/27/2017 INFLUENZA AGE 9 TO ADULT 8/1/2017 MEDICARE YEARLY EXAM 3/24/2018 DTaP/Tdap/Td series (2 - Td) 10/22/2024 Allergies as of 8/14/2017  Review Complete On: 8/14/2017 By: Ghulam Ragsdale LPN No Known Allergies Current Immunizations  Reviewed on 3/23/2017 Name Date Influenza High Dose Vaccine PF 9/22/2016, 10/20/2015, 10/22/2014 Influenza Vaccine 12/6/2013 Pneumococcal Conjugate (PCV-13) 3/23/2017 Pneumococcal Polysaccharide (PPSV-23) 12/6/2013 Tdap 10/22/2014 Not reviewed this visit You Were Diagnosed With   
  
 Codes Comments Chronic atrial fibrillation (HCC)    -  Primary ICD-10-CM: U17.4 ICD-9-CM: 427.31 Anticoagulation goal of INR 2 to 3     ICD-10-CM: Z51.81, Z79.01 
ICD-9-CM: V58.83, V58.61 Vitals BP Pulse Resp Height(growth percentile) Weight(growth percentile) SpO2  
 128/65 (BP 1 Location: Left arm, BP Patient Position: Sitting) 77 14 5' 3\" (1.6 m) 145 lb 9.6 oz (66 kg) 97% BMI OB Status Smoking Status 25.79 kg/m2 Postmenopausal Never Smoker BMI and BSA Data Body Mass Index Body Surface Area 25.79 kg/m 2 1.71 m 2 Preferred Pharmacy Pharmacy Name Phone [facility], [address] [phone] Your Updated Medication List  
  
   
This list is accurate as of: 8/14/17 10:05 AM.  Always use your most recent med list. amLODIPine 5 mg tablet Commonly known as:  Kamaljit Lute Take 1 Tab by mouth daily. CARDURA 4 mg tablet Generic drug:  doxazosin Take 1 mg by mouth two (2) times a day. COUMADIN 5 mg tablet Generic drug:  warfarin Take 5 mg by mouth daily. Take one and a half tablets, 7.5 mg on Tuesday and Thursday Saturday, and Sunday Take one tablet, 5mg all other days. flecainide 100 mg tablet Commonly known as:  TAMBOCOR Take 1 Tab by mouth three (3) times daily. hydroCHLOROthiazide 25 mg tablet Commonly known as:  HYDRODIURIL Take 1 Tab by mouth daily. levothyroxine 50 mcg tablet Commonly known as:  SYNTHROID  
TAKE 1 TABLET BY MOUTH EVERY MORNING BEFORE BREAKFAST FOR THYROID Description Next appointment 9/14/17 at 10 am 
  
  
August 2017 Details Sun Mon Tue Wed Thu Fri Sat  
    1  
  
  
  
   2  
  
  
  
   3  
  
  
  
   4  
  
  
  
   5  
  
  
  
  
  6  
  
  
  
   7  
  
  
  
   8  
  
  
  
   9  
  
  
  
   10  
  
  
  
   11  
  
  
  
   12  
  
  
  
  
  13  
  
  
  
   14  
  
5 mg See details 15  
  
7.5 mg  
  
   16  
  
5 mg  
  
   17  
  
7.5 mg  
  
   18  
  
5 mg  
  
   19  
  
7.5 mg  
  
  
  20  
  
7.5 mg  
  
   21  
  
5 mg  
  
   22  
  
7.5 mg  
  
   23  
  
5 mg  
  
   24  
  
7.5 mg  
  
   25  
  
5 mg  
  
   26  
  
7.5 mg  
  
  
  27  
  
7.5 mg  
  
   28  
  
5 mg  
  
   29  
  
7.5 mg  
  
   30  
  
5 mg  
  
   31  
  
7.5 mg Date Details 08/14 This INR check INR: 2.3 How to take your warfarin dose To take:  5 mg Take one of the 5 mg tablets. To take:  7.5 mg Take one and a half of the 5 mg tablets. September 2017 Details Edwin Everett Tue Wed Thu Fri Sat  
       1  
  
5 mg  
  
   2  
  
7.5 mg  
  
  
  3  
  
7.5 mg  
  
   4  
  
5 mg  
  
   5  
  
7.5 mg  
  
   6  
  
5 mg  
  
   7  
  
7.5 mg  
  
   8  
  
5 mg 9  
  
7.5 mg  
  
  
  10  
  
7.5 mg  
  
   11  
  
5 mg  
  
   12  
  
7.5 mg  
  
   13  
  
5 mg  
  
   14  
  
7.5 mg  
  
   15  
  
  
  
   16  
  
  
  
  
  17  
  
  
  
   18  
  
  
  
   19  
  
  
  
   20  
  
  
  
   21  
  
  
  
   22  
  
  
  
   23  
  
  
  
  
  24  
  
  
  
   25  
  
  
  
   26  
  
  
  
   27  
  
  
  
   28  
  
  
  
   29  
  
  
  
   30  
  
  
  
  
 Date Details No additional details Date of next INR:  9/14/2017 How to take your warfarin dose To take:  5 mg Take one of the 5 mg tablets. To take:  7.5 mg Take one and a half of the 5 mg tablets. We Performed the Following AMB POC PT/INR [67593 CPT(R)] Introducing [facility] [place] [facility] SERVICES! [employer] introduces [employer] patient portal. Now you can access parts of your medical record, email your doctor's office, and request medication refills online. 1. In your internet browser, go to [email or web address] 2. Click on the First Time User? Click Here link in the Sign In box. You will see the New Member Sign Up page. 3. Enter your [employer] Access Code exactly as it appears below. You will not need to use this code after youve completed the sign-up process. If you do not sign up before the expiration date, you must request a new code. · [employer] Access Code: [email or web address] Expires: 11/12/2017 10:05 AM 
 
4. Enter the last four digits of your Social Security Number (xxxx) and Date of Birth (mm/dd/yyyy) as indicated and click Submit. You will be taken to the next sign-up page. 5. Create a [employer] ID. This will be your [employer] login ID and cannot be changed, so think of one that is secure and easy to remember. 6. Create a [employer] password. You can change your password at any time. 7. Enter your Password Reset Question and Answer. This can be used at a later time if you forget your password. 8. Enter your e-mail address. You will receive e-mail notification when new information is available in [address] 9. Click Sign Up. You can now view and download portions of your medical record. 10. Click the Download Summary menu link to download a portable copy of your medical information. If you have questions, please visit the Frequently Asked Questions section of the [employer] website. Remember, [employer] is NOT to be used for urgent needs. For medical emergencies, dial 911. Now available from your iPhone and Android! Please provide this summary of care documentation to your next provider. Your primary care clinician is listed as Darling Caraballo. If you have any questions after today's visit, please call [phone].

## 2017-08-14 NOTE — PROGRESS NOTES
Patient presents for a PT/INR  See med list and patient instructions along with results for verbal orders by Dr. Sabine Currie. Results for orders placed or performed in visit on 08/14/17   AMB POC PT/INR   Result Value Ref Range    VALID INTERNAL CONTROL POC Yes     Prothrombin time (POC) 27.8 seconds    INR POC 2.3      Vitals:    08/14/17 0959   BP: 128/65   Pulse: 77   Resp: 14   SpO2: 97%   Weight: 145 lb 9.6 oz (66 kg)   Height: 5' 3\" (1.6 m)     Prior to Admission medications    Medication Sig Start Date End Date Taking? Authorizing Provider   levothyroxine (SYNTHROID) 50 mcg tablet TAKE 1 TABLET BY MOUTH EVERY MORNING BEFORE BREAKFAST FOR THYROID 6/20/17  Yes Ehsan Milner MD   hydroCHLOROthiazide (HYDRODIURIL) 25 mg tablet Take 1 Tab by mouth daily. 5/30/17  Yes Ehsan Milner MD   flecainide (TAMBOCOR) 100 mg tablet Take 1 Tab by mouth three (3) times daily. 5/9/17  Yes Paula Noguera NP   warfarin (COUMADIN) 5 mg tablet Take 5 mg by mouth daily. Take one and a half tablets, 7.5 mg on Tuesday and Thursday Saturday, and Sunday Take one tablet, 5mg all other days. Yes Historical Provider   amLODIPine (NORVASC) 5 mg tablet Take 1 Tab by mouth daily. 1/5/17  Yes Ehsan Milner MD   doxazosin (CARDURA) 4 mg tablet Take 1 mg by mouth two (2) times a day. Yes Historical Provider       Instructed to continue coumadin 5 mg tablet, Take one and a half tablets, 7.5 mg on Tuesday and Thursday Saturday, and Sunday Take one tablet, 5mg all other days. She voiced understanding and AVS given with dosage calender.

## 2017-09-14 ENCOUNTER — CLINICAL SUPPORT (OUTPATIENT)
Dept: FAMILY MEDICINE CLINIC | Age: 82
End: 2017-09-14

## 2017-09-14 VITALS
HEIGHT: 63 IN | WEIGHT: 146 LBS | SYSTOLIC BLOOD PRESSURE: 150 MMHG | DIASTOLIC BLOOD PRESSURE: 66 MMHG | TEMPERATURE: 98.2 F | OXYGEN SATURATION: 96 % | HEART RATE: 68 BPM | RESPIRATION RATE: 16 BRPM | BODY MASS INDEX: 25.87 KG/M2

## 2017-09-14 DIAGNOSIS — Z51.81 ANTICOAGULATION GOAL OF INR 2 TO 3: ICD-10-CM

## 2017-09-14 DIAGNOSIS — Z79.01 ANTICOAGULATION GOAL OF INR 2 TO 3: ICD-10-CM

## 2017-09-14 DIAGNOSIS — I48.20 CHRONIC ATRIAL FIBRILLATION (HCC): Primary | ICD-10-CM

## 2017-09-14 LAB
INR BLD: 2.7
PT POC: 31.9 SECONDS
VALID INTERNAL CONTROL?: YES

## 2017-09-14 NOTE — PROGRESS NOTES
Patient presents for a PT/INR  See med list and patient instructions along with results for verbal orders by Dr. Carmen Boswell. Results for orders placed or performed in visit on 09/14/17   AMB POC PT/INR   Result Value Ref Range    VALID INTERNAL CONTROL POC Yes     Prothrombin time (POC) 31.9 seconds    INR POC 2.7      Vitals:    09/14/17 1000   BP: 150/66   Pulse: 68   Resp: 16   Temp: 98.2 °F (36.8 °C)   SpO2: 96%   Weight: 146 lb (66.2 kg)   Height: 5' 3\" (1.6 m)     Prior to Admission medications    Medication Sig Start Date End Date Taking? Authorizing Provider   levothyroxine (SYNTHROID) 50 mcg tablet TAKE 1 TABLET BY MOUTH EVERY MORNING BEFORE BREAKFAST FOR THYROID 6/20/17  Yes Renea Nixon MD   hydroCHLOROthiazide (HYDRODIURIL) 25 mg tablet Take 1 Tab by mouth daily. 5/30/17  Yes Renea Nixon MD   flecainide (TAMBOCOR) 100 mg tablet Take 1 Tab by mouth three (3) times daily. 5/9/17  Yes Paula Noguear NP   warfarin (COUMADIN) 5 mg tablet Take 5 mg by mouth daily. Take one and a half tablets, 7.5 mg on Tuesday and Thursday Saturday, and Sunday Take one tablet, 5mg all other days. Yes Historical Provider   amLODIPine (NORVASC) 5 mg tablet Take 1 Tab by mouth daily. 1/5/17  Yes Renea Nixon MD   doxazosin (CARDURA) 4 mg tablet Take 1 mg by mouth two (2) times a day. Yes Historical Provider       Per dr. Amanda Robertson verbal order continue Coumadin 5 mg tablet taking one and a half tablets, 7.5 mg on Tuesday and Thursday Saturday, and Sunday Take one tablet, 5mg all other days. Return for recheck in one month. She voiced understanding of all instructions and AVS with coumadin calendar given.

## 2017-09-14 NOTE — MR AVS SNAPSHOT
Visit Information Date & Time Provider Department Dept. Phone Encounter #  
 9/14/2017  9:30 AM [address] [address] [phone] [id number] Your Appointments 9/29/2017  9:15 AM  
ROUTINE CARE with MD Ivon ReynagaAlex Spencer [address] ([address]) Appt Note: 6 month f/u bp $0cp wmc [address], [address] [place] [address]  
[phone]  
  
   
 [address], [place] [address] [address] Upcoming Health Maintenance Date Due  
 GLAUCOMA SCREENING Q2Y 5/27/2017 INFLUENZA AGE 9 TO ADULT 8/1/2017 MEDICARE YEARLY EXAM 3/24/2018 DTaP/Tdap/Td series (2 - Td) 10/22/2024 Allergies as of 9/14/2017  Review Complete On: 9/14/2017 By: Sarita Feng LPN No Known Allergies Current Immunizations  Reviewed on 3/23/2017 Name Date Influenza High Dose Vaccine PF 9/22/2016, 10/20/2015, 10/22/2014 Influenza Vaccine 12/6/2013 Pneumococcal Conjugate (PCV-13) 3/23/2017 Pneumococcal Polysaccharide (PPSV-23) 12/6/2013 Tdap 10/22/2014 Not reviewed this visit You Were Diagnosed With   
  
 Codes Comments Chronic atrial fibrillation (HCC)    -  Primary ICD-10-CM: P38.1 ICD-9-CM: 427.31 Anticoagulation goal of INR 2 to 3     ICD-10-CM: Z51.81, Z79.01 
ICD-9-CM: V58.83, V58.61 Vitals BP Pulse Temp Resp Height(growth percentile) Weight(growth percentile) 150/66 (BP 1 Location: Left arm, BP Patient Position: Sitting) 68 98.2 °F (36.8 °C) 16 5' 3\" (1.6 m) 146 lb (66.2 kg) SpO2 BMI OB Status Smoking Status 96% 25.86 kg/m2 Postmenopausal Never Smoker BMI and BSA Data Body Mass Index Body Surface Area  
 25.86 kg/m 2 1.72 m 2 Preferred Pharmacy Pharmacy Name Phone [facility], [address] [phone] Your Updated Medication List  
  
   
 This list is accurate as of: 9/14/17 10:06 AM.  Always use your most recent med list. amLODIPine 5 mg tablet Commonly known as:  Mirta Murphy Take 1 Tab by mouth daily. CARDURA 4 mg tablet Generic drug:  doxazosin Take 1 mg by mouth two (2) times a day. COUMADIN 5 mg tablet Generic drug:  warfarin Take 5 mg by mouth daily. Take one and a half tablets, 7.5 mg on Tuesday and Thursday Saturday, and Sunday Take one tablet, 5mg all other days. flecainide 100 mg tablet Commonly known as:  TAMBOCOR Take 1 Tab by mouth three (3) times daily. hydroCHLOROthiazide 25 mg tablet Commonly known as:  HYDRODIURIL Take 1 Tab by mouth daily. levothyroxine 50 mcg tablet Commonly known as:  SYNTHROID  
TAKE 1 TABLET BY MOUTH EVERY MORNING BEFORE BREAKFAST FOR THYROID September 2017 Details Sun Mon Tue Wed Thu Fri Sat  
       1  
  
  
  
   2  
  
  
  
  
  3  
  
  
  
   4  
  
  
  
   5  
  
  
  
   6  
  
  
  
   7  
  
  
  
   8  
  
  
  
   9  
  
  
  
  
  10  
  
  
  
   11  
  
  
  
   12  
  
  
  
   13  
  
  
  
   14  
  
7.5 mg  
See details 15  
  
5 mg  
  
   16  
  
7.5 mg  
  
  
  17  
  
7.5 mg  
  
   18  
  
5 mg  
  
   19  
  
7.5 mg  
  
   20  
  
5 mg  
  
   21  
  
7.5 mg  
  
   22  
  
5 mg  
  
   23  
  
7.5 mg  
  
  
  24  
  
7.5 mg  
  
   25  
  
5 mg  
  
   26  
  
7.5 mg  
  
   27  
  
5 mg  
  
   28  
  
7.5 mg  
  
   29  
  
5 mg  
  
   30  
  
7.5 mg Date Details 09/14 This INR check INR: 2.7 How to take your warfarin dose To take:  5 mg Take one of the 5 mg tablets. To take:  7.5 mg Take one and a half of the 5 mg tablets. October 2017 Details Antoinette Bahena Tue Wed Thu Fri Sat  
  1  
  
7.5 mg  
  
   2  
  
5 mg  
  
   3  
  
7.5 mg  
  
   4  
  
5 mg  
  
   5  
  
7.5 mg  
  
   6  
  
5 mg  
  
   7  
  
7.5 mg  
  
  
  8  
  
7.5 mg  
  
   9  
 5 mg  
  
   10  
  
7.5 mg  
  
   11  
  
5 mg  
  
   12  
  
7.5 mg  
  
   13  
  
5 mg  
  
   14  
  
7.5 mg  
  
  
  15  
  
7.5 mg  
  
   16  
  
5 mg  
  
   17  
  
  
  
   18  
  
  
  
   19  
  
  
  
   20  
  
  
  
   21  
  
  
  
  
  22  
  
  
  
   23  
  
  
  
   24  
  
  
  
   25  
  
  
  
   26  
  
  
  
   27  
  
  
  
   28  
  
  
  
  
  29  
  
  
  
   30  
  
  
  
   31  
  
  
  
      
 Date Details No additional details Date of next INR:  10/16/2017 How to take your warfarin dose To take:  5 mg Take one of the 5 mg tablets. To take:  7.5 mg Take one and a half of the 5 mg tablets. We Performed the Following AMB POC PT/INR [73468 CPT(R)] Introducing [facility] & HEALTH SERVICES! [facility] introduces [employer] patient portal. Now you can access parts of your medical record, email your doctor's office, and request medication refills online. 1. In your internet browser, go to [email or web address] 2. Click on the First Time User? Click Here link in the Sign In box. You will see the New Member Sign Up page. 3. Enter your [employer] Access Code exactly as it appears below. You will not need to use this code after youve completed the sign-up process. If you do not sign up before the expiration date, you must request a new code. · [employer] Access Code: [email or web address] Expires: 11/12/2017 10:05 AM 
 
4. Enter the last four digits of your Social Security Number (xxxx) and Date of Birth (mm/dd/yyyy) as indicated and click Submit. You will be taken to the next sign-up page. 5. Create a [employer] ID. This will be your [employer] login ID and cannot be changed, so think of one that is secure and easy to remember. 6. Create a [employer] password. You can change your password at any time. 7. Enter your Password Reset Question and Answer. This can be used at a later time if you forget your password. 8. Enter your e-mail address. You will receive e-mail notification when new information is available in [address] 9. Click Sign Up. You can now view and download portions of your medical record. 10. Click the Download Summary menu link to download a portable copy of your medical information. If you have questions, please visit the Frequently Asked Questions section of the [employer] website. Remember, [employer] is NOT to be used for urgent needs. For medical emergencies, dial 911. Now available from your iPhone and Android! Please provide this summary of care documentation to your next provider. Your primary care clinician is listed as Chriss Manuel. If you have any questions after today's visit, please call [phone].

## 2017-09-29 ENCOUNTER — HOSPITAL ENCOUNTER (OUTPATIENT)
Dept: LAB | Age: 82
Discharge: HOME | End: 2017-09-29
Payer: MEDICARE

## 2017-09-29 ENCOUNTER — OFFICE VISIT (OUTPATIENT)
Dept: FAMILY MEDICINE CLINIC | Age: 82
End: 2017-09-29

## 2017-09-29 VITALS
DIASTOLIC BLOOD PRESSURE: 62 MMHG | HEIGHT: 63 IN | HEART RATE: 71 BPM | WEIGHT: 146.2 LBS | TEMPERATURE: 97.9 F | RESPIRATION RATE: 16 BRPM | SYSTOLIC BLOOD PRESSURE: 160 MMHG | BODY MASS INDEX: 25.91 KG/M2 | OXYGEN SATURATION: 98 %

## 2017-09-29 DIAGNOSIS — E03.9 HYPOTHYROIDISM, UNSPECIFIED TYPE: ICD-10-CM

## 2017-09-29 DIAGNOSIS — I10 ESSENTIAL HYPERTENSION: Primary | ICD-10-CM

## 2017-09-29 DIAGNOSIS — E78.5 HYPERLIPIDEMIA, UNSPECIFIED HYPERLIPIDEMIA TYPE: ICD-10-CM

## 2017-09-29 DIAGNOSIS — Z23 ENCOUNTER FOR IMMUNIZATION: ICD-10-CM

## 2017-09-29 DIAGNOSIS — I48.20 CHRONIC ATRIAL FIBRILLATION (HCC): ICD-10-CM

## 2017-09-29 PROBLEM — R09.89 WIDENED PULSE PRESSURE: Status: ACTIVE | Noted: 2017-09-29

## 2017-09-29 LAB
INR BLD: 1.9
PT POC: 23.3 SECONDS
VALID INTERNAL CONTROL?: YES

## 2017-09-29 PROCEDURE — 80061 LIPID PANEL: CPT

## 2017-09-29 PROCEDURE — 80053 COMPREHEN METABOLIC PANEL: CPT

## 2017-09-29 PROCEDURE — 84443 ASSAY THYROID STIM HORMONE: CPT

## 2017-09-29 PROCEDURE — 36415 COLL VENOUS BLD VENIPUNCTURE: CPT

## 2017-09-29 PROCEDURE — 85025 COMPLETE CBC W/AUTO DIFF WBC: CPT

## 2017-09-29 NOTE — PROGRESS NOTES
HPI:  80 y.o.  presents for follow up appointment. No hospital, ER or specialist visits since last primary care visit except as noted below. Patient Active Problem List    Diagnosis    Pulmonary hypertension ([facility]     1/16 - PASP=68, EF 55%. Chronic dyspnea, no orthopnea, no PND      Hyperlipidemia - Takes medication at night as directed, no muscle aches. Tries to watch diet.  Hypothyroid - No hair loss, no constipation, no dry skin or brittle nails, no palpitations. Taking medication each morning on an empty stomach.  S/P lobectomy of lung    Atrial fibrillation ([facility]     Cardioverted by flecainide. No symptomatic palpitations, no feet swelling.  Hypertension - No home monitoring. Compliant with medication. no chest pain, no vision change, no headache,          Past Medical History:   Diagnosis Date    Atrial fibrillation ([facility]     Hyperlipidemia 6/18/2014    Hypertension     Hypothyroid 3/18/2014    Pulmonary hypertension ([facility] 2/1/2016 1/16 - PASP=68, EF 55%     S/P lobectomy of lung        Social History   Substance Use Topics    Smoking status: Never Smoker    Smokeless tobacco: Never Used    Alcohol use No       Outpatient Prescriptions Marked as Taking for the 9/29/17 encounter (Office Visit) with Clary Keenan MD   Medication Sig Dispense Refill    levothyroxine (SYNTHROID) 50 mcg tablet TAKE 1 TABLET BY MOUTH EVERY MORNING BEFORE BREAKFAST FOR THYROID 90 Tab 3    hydroCHLOROthiazide (HYDRODIURIL) 25 mg tablet Take 1 Tab by mouth daily. 90 Tab 3    flecainide (TAMBOCOR) 100 mg tablet Take 1 Tab by mouth three (3) times daily. 270 Tab 1    warfarin (COUMADIN) 5 mg tablet Take 5 mg by mouth daily. Take one and a half tablets, 7.5 mg on Tuesday and Thursday Saturday, and Sunday Take one tablet, 5mg all other days.  amLODIPine (NORVASC) 5 mg tablet Take 1 Tab by mouth daily. 90 Tab 3    doxazosin (CARDURA) 4 mg tablet Take 1 mg by mouth two (2) times a day. No Known Allergies    ROS:  ROS negative except as per HPI. PE:  Visit Vitals    /62    Pulse 71    Temp 97.9 °F (36.6 °C) (Oral)    Resp 16    Ht 5' 3\" (1.6 m)    Wt 146 lb 3.2 oz (66.3 kg)    SpO2 98%    BMI 25.9 kg/m2     Gen: alert, oriented, no acute distress  Head: normocephalic, atraumatic  Ears: external auditory canals clear, TMs without erythema or effusion  Eyes: pupils equal round reactive to light, sclera clear, conjunctiva clear  Oral: moist mucus membranes, no oral lesions, no pharyngeal inflammation or exudate  Neck: symmetric normal sized thyroid, no carotid bruits, no jugular vein distention  Resp: no increase work of breathing, lungs clear to ausculation bilaterally, no wheezing, rales or rhonchi  CV: S1, S2 normal.  No murmurs, rubs, or gallops. Abd: soft, not tender, not distended. No hepatosplenomegaly. Normal bowel sounds. Neuro: cranial nerves intact, normal strength and movement in all extremities, reflexes and sensation intact and symmetric. Skin: no lesion or rash  Extremities: no cyanosis or edema      Assessment/Plan:    1. Essential hypertension with wide pulse pressure  At goal today, no changes. - CBC WITH AUTOMATED DIFF    2. Chronic atrial fibrillation (HCC)  INR near goal, 2 weeks ago was 2.6, no changes. - AMB POC PT/INR    3. Hypothyroidism, unspecified type  Asymptomatic, no changes pending labs  - TSH 3RD GENERATION    4. Hyperlipidemia, unspecified hyperlipidemia type  At goal.  Continue current medications.   - METABOLIC PANEL, COMPREHENSIVE  - LIPID PANEL    5. Encounter for immunization  - Influenza virus vaccine ([address]) 72 years and older (96158)  - Administration fee () for Medicare insured patients      Health Maintenance reviewed - updated.     Orders Placed This Encounter    Influenza virus vaccine (FLUZONE HIGH-DOSE) 72 years and older (86913)    CBC WITH AUTOMATED DIFF    METABOLIC PANEL, COMPREHENSIVE    LIPID PANEL    TSH 3RD GENERATION    AMB POC PT/INR    Administration fee () for Medicare insured patients       There are no discontinued medications. Current Outpatient Prescriptions   Medication Sig Dispense Refill    levothyroxine (SYNTHROID) 50 mcg tablet TAKE 1 TABLET BY MOUTH EVERY MORNING BEFORE BREAKFAST FOR THYROID 90 Tab 3    hydroCHLOROthiazide (HYDRODIURIL) 25 mg tablet Take 1 Tab by mouth daily. 90 Tab 3    flecainide (TAMBOCOR) 100 mg tablet Take 1 Tab by mouth three (3) times daily. 270 Tab 1    warfarin (COUMADIN) 5 mg tablet Take 5 mg by mouth daily. Take one and a half tablets, 7.5 mg on Tuesday and Thursday Saturday, and Sunday Take one tablet, 5mg all other days.  amLODIPine (NORVASC) 5 mg tablet Take 1 Tab by mouth daily. 90 Tab 3    doxazosin (CARDURA) 4 mg tablet Take 1 mg by mouth two (2) times a day. Verbal and written instructions (see AVS) provided. Patient expresses understanding of diagnosis and treatment plan.

## 2017-09-29 NOTE — PROGRESS NOTES
Chief Complaint   Patient presents with    Follow-up     No concerns today. 1. Have you been to the ER, urgent care clinic since your last visit? Hospitalized since your last visit? No    2. Have you seen or consulted any other health care providers outside of the [facility] since your last visit? Include any pap smears or colon screening.  No

## 2017-09-29 NOTE — MR AVS SNAPSHOT
Visit Information Date & Time Provider Department Dept. Phone Encounter #  
 9/29/2017  9:15 AM Ehsan Milner, [address] [facility] [phone] [id number] Upcoming Health Maintenance Date Due  
 GLAUCOMA SCREENING Q2Y 5/27/2017 INFLUENZA AGE 9 TO ADULT 8/1/2017 MEDICARE YEARLY EXAM 3/24/2018 DTaP/Tdap/Td series (2 - Td) 10/22/2024 Allergies as of 9/29/2017  Review Complete On: 9/29/2017 By: Ehsan Milner MD  
 No Known Allergies Current Immunizations  Reviewed on 3/23/2017 Name Date Influenza High Dose Vaccine PF  Incomplete, 9/22/2016, 10/20/2015, 10/22/2014 Influenza Vaccine 12/6/2013 Pneumococcal Conjugate (PCV-13) 3/23/2017 Pneumococcal Polysaccharide (PPSV-23) 12/6/2013 Tdap 10/22/2014 Not reviewed this visit You Were Diagnosed With   
  
 Codes Comments Essential hypertension    -  Primary ICD-10-CM: I10 
ICD-9-CM: 401.9 Chronic atrial fibrillation (HCC)     ICD-10-CM: J36.5 ICD-9-CM: 427.31 Hypothyroidism, unspecified type     ICD-10-CM: E03.9 ICD-9-CM: 244.9 Hyperlipidemia, unspecified hyperlipidemia type     ICD-10-CM: E78.5 ICD-9-CM: 272.4 Encounter for immunization     ICD-10-CM: K26 ICD-9-CM: V03.89 Vitals BP Pulse Temp Resp Height(growth percentile) Weight(growth percentile) 160/62 71 97.9 °F (36.6 °C) (Oral) 16 5' 3\" (1.6 m) 146 lb 3.2 oz (66.3 kg) SpO2 BMI OB Status Smoking Status 98% 25.9 kg/m2 Postmenopausal Never Smoker Vitals History BMI and BSA Data Body Mass Index Body Surface Area  
 25.9 kg/m 2 1.72 m 2 Preferred Pharmacy Pharmacy Name Phone [facility] [address] [phone] Your Updated Medication List  
  
   
This list is accurate as of: 9/29/17  9:39 AM.  Always use your most recent med list. amLODIPine 5 mg tablet Commonly known as:  Rajiv Michel Take 1 Tab by mouth daily. CARDURA 4 mg tablet Generic drug:  doxazosin Take 1 mg by mouth two (2) times a day. COUMADIN 5 mg tablet Generic drug:  warfarin Take 5 mg by mouth daily. Take one and a half tablets, 7.5 mg on Tuesday and Thursday Saturday, and Sunday Take one tablet, 5mg all other days. flecainide 100 mg tablet Commonly known as:  TAMBOCOR Take 1 Tab by mouth three (3) times daily. hydroCHLOROthiazide 25 mg tablet Commonly known as:  HYDRODIURIL Take 1 Tab by mouth daily. levothyroxine 50 mcg tablet Commonly known as:  SYNTHROID  
TAKE 1 TABLET BY MOUTH EVERY MORNING BEFORE BREAKFAST FOR THYROID We Performed the Following ADMIN INFLUENZA VIRUS VAC [ HCPCS] AMB POC PT/INR [91049 CPT(R)] CBC WITH AUTOMATED DIFF [11060 CPT(R)] INFLUENZA VIRUS VACCINE, HIGH DOSE SEASONAL, PRESERVATIVE FREE [01613 CPT(R)] LIPID PANEL [81565 CPT(R)] METABOLIC PANEL, COMPREHENSIVE [77757 CPT(R)] TSH 3RD GENERATION [18174 CPT(R)] Patient Instructions Vaccine Information Statement Influenza (Flu) Vaccine (Inactivated or Recombinant): What you need to know Many Vaccine Information Statements are available in Citizen of the [place] and other languages. See www.immunize.org/vis Hojas de Información Sobre Vacunas están disponibles en Español y en muchos otros idiomas. Visite www.immunize.org/vis 1. Why get vaccinated? Influenza (flu) is a contagious disease that spreads around the [place] every year, usually between October and May. Flu is caused by influenza viruses, and is spread mainly by coughing, sneezing, and close contact. Anyone can get flu. Flu strikes suddenly and can last several days. Symptoms vary by age, but can include: 
 fever/chills  sore throat  muscle aches  fatigue  cough  headache  runny or stuffy nose Flu can also lead to pneumonia and blood infections, and cause diarrhea and seizures in children. If you have a medical condition, such as heart or lung disease, flu can make it worse. Flu is more dangerous for some people. Infants and young children, people 72years of age and older, pregnant women, and people with certain health conditions or a weakened immune system are at greatest risk. Each year thousands of people in the [facility] die from flu, and many more are hospitalized. Flu vaccine can: 
 keep you from getting flu, 
 make flu less severe if you do get it, and 
 keep you from spreading flu to your family and other people. 2. Inactivated and recombinant flu vaccines A dose of flu vaccine is recommended every flu season. Children 6 months through 6years of age may need two doses during the same flu season. Everyone else needs only one dose each flu season. Some inactivated flu vaccines contain a very small amount of a mercury-based preservative called thimerosal. Studies have not shown thimerosal in vaccines to be harmful, but flu vaccines that do not contain thimerosal are available. There is no live flu virus in flu shots. They cannot cause the flu. There are many flu viruses, and they are always changing. Each year a new flu vaccine is made to protect against three or four viruses that are likely to cause disease in the upcoming flu season. But even when the vaccine doesnt exactly match these viruses, it may still provide some protection Flu vaccine cannot prevent: 
 flu that is caused by a virus not covered by the vaccine, or 
 illnesses that look like flu but are not. It takes about 2 weeks for protection to develop after vaccination, and protection lasts through the flu season. 3. Some people should not get this vaccine Tell the person who is giving you the vaccine:  If you have any severe, life-threatening allergies.    
If you ever had a life-threatening allergic reaction after a dose of flu vaccine, or have a severe allergy to any part of this vaccine, you may be advised not to get vaccinated. Most, but not all, types of flu vaccine contain a small amount of egg protein.  If you ever had Guillain-Barré Syndrome (also called GBS). Some people with a history of GBS should not get this vaccine. This should be discussed with your doctor.  If you are not feeling well. It is usually okay to get flu vaccine when you have a mild illness, but you might be asked to come back when you feel better. 4. Risks of a vaccine reaction With any medicine, including vaccines, there is a chance of reactions. These are usually mild and go away on their own, but serious reactions are also possible. Most people who get a flu shot do not have any problems with it. Minor problems following a flu shot include:  
 soreness, redness, or swelling where the shot was given  hoarseness  sore, red or itchy eyes  cough  fever  aches  headache  itching  fatigue If these problems occur, they usually begin soon after the shot and last 1 or 2 days. More serious problems following a flu shot can include the following:  There may be a small increased risk of Guillain-Barré Syndrome (GBS) after inactivated flu vaccine. This risk has been estimated at 1 or 2 additional cases per million people vaccinated. This is much lower than the risk of severe complications from flu, which can be prevented by flu vaccine.  Young children who get the flu shot along with pneumococcal vaccine (PCV13) and/or DTaP vaccine at the same time might be slightly more likely to have a seizure caused by fever. Ask your doctor for more information. Tell your doctor if a child who is getting flu vaccine has ever had a seizure. Problems that could happen after any injected vaccine:  People sometimes faint after a medical procedure, including vaccination. Sitting or lying down for about 15 minutes can help prevent fainting, and injuries caused by a fall. Tell your doctor if you feel dizzy, or have vision changes or ringing in the ears.  Some people get severe pain in the shoulder and have difficulty moving the arm where a shot was given. This happens very rarely.  Any medication can cause a severe allergic reaction. Such reactions from a vaccine are very rare, estimated at about 1 in a million doses, and would happen within a few minutes to a few hours after the vaccination. As with any medicine, there is a very remote chance of a vaccine causing a serious injury or death. The safety of vaccines is always being monitored. For more information, visit: www.cdc.gov/vaccinesafety/ 
 
 
The [facility] Vaccine Injury Compensation Program (VICP) is a federal program that was created to compensate people who may have been injured by certain vaccines.  
 
Persons who believe they may have been injured by a vaccine can learn about the program and about filing a claim by calling 3-226.418.7589 or visiting the [address] [facility] website at www.[facility].gov/vaccinecompensation. There is a time limit to file a claim for compensation. 7. How can I learn more?  Ask your healthcare provider. He or she can give you the vaccine package insert or suggest other sources of information.  Call your local or state health department.  Contact the Centers for Disease Control and Prevention (CDC): 
- Call 7-918.765.2840 (1-800-CDC-INFO) or 
- Visit CDCs website at www.cdc.gov/flu Vaccine Information Statement Inactivated Influenza Vaccine 8/7/2015 
42 ARETHA Pearl [id number] On license of [facility] and [employer] Centers for Disease Control and Prevention Office Use Only Introducing [facility]! [facility] introduces [employer] patient portal. Now you can access parts of your medical record, email your doctor's office, and request medication refills online. 1. In your internet browser, go to [email or web address] 2. Click on the First Time User? Click Here link in the Sign In box. You will see the New Member Sign Up page. 3. Enter your [employer] Access Code exactly as it appears below. You will not need to use this code after youve completed the sign-up process. If you do not sign up before the expiration date, you must request a new code. · [employer] Access Code: [email or web address] Expires: 11/12/2017 10:05 AM 
 
4. Enter the last four digits of your Social Security Number (xxxx) and Date of Birth (mm/dd/yyyy) as indicated and click Submit. You will be taken to the next sign-up page. 5. Create a [employer] ID. This will be your [employer] login ID and cannot be changed, so think of one that is secure and easy to remember. 6. Create a [employer] password. You can change your password at any time. 7. Enter your Password Reset Question and Answer. This can be used at a later time if you forget your password. 8. Enter your e-mail address. You will receive e-mail notification when new information is available in [address] 9. Click Sign Up. You can now view and download portions of your medical record. 10. Click the Download Summary menu link to download a portable copy of your medical information. If you have questions, please visit the Frequently Asked Questions section of the [employer] website. Remember, [employer] is NOT to be used for urgent needs. For medical emergencies, dial 911. Now available from your iPhone and Android! Please provide this summary of care documentation to your next provider. Your primary care clinician is listed as Farzana Bauer. If you have any questions after today's visit, please call [phone].

## 2017-09-29 NOTE — PATIENT INSTRUCTIONS
Vaccine Information Statement    Influenza (Flu) Vaccine (Inactivated or Recombinant): What you need to know    Many Vaccine Information Statements are available in [place] and other languages. See www.immunize.org/vis  Hojas de Información Sobre Vacunas están disponibles en Español y en muchos otros idiomas. Visite www.immunize.org/vis    1. Why get vaccinated? Influenza (flu) is a contagious disease that spreads around the [place] every year, usually between October and May. Flu is caused by influenza viruses, and is spread mainly by coughing, sneezing, and close contact. Anyone can get flu. Flu strikes suddenly and can last several days. Symptoms vary by age, but can include:   fever/chills   sore throat   muscle aches   fatigue   cough   headache    runny or stuffy nose    Flu can also lead to pneumonia and blood infections, and cause diarrhea and seizures in children. If you have a medical condition, such as heart or lung disease, flu can make it worse. Flu is more dangerous for some people. Infants and young children, people 72years of age and older, pregnant women, and people with certain health conditions or a weakened immune system are at greatest risk. Each year thousands of people in the [facility] die from flu, and many more are hospitalized. Flu vaccine can:   keep you from getting flu,   make flu less severe if you do get it, and   keep you from spreading flu to your family and other people. 2. Inactivated and recombinant flu vaccines    A dose of flu vaccine is recommended every flu season. Children 6 months through 6years of age may need two doses during the same flu season. Everyone else needs only one dose each flu season.        Some inactivated flu vaccines contain a very small amount of a mercury-based preservative called thimerosal. Studies have not shown thimerosal in vaccines to be harmful, but flu vaccines that do not contain thimerosal are available. There is no live flu virus in flu shots. They cannot cause the flu. There are many flu viruses, and they are always changing. Each year a new flu vaccine is made to protect against three or four viruses that are likely to cause disease in the upcoming flu season. But even when the vaccine doesnt exactly match these viruses, it may still provide some protection    Flu vaccine cannot prevent:   flu that is caused by a virus not covered by the vaccine, or   illnesses that look like flu but are not. It takes about 2 weeks for protection to develop after vaccination, and protection lasts through the flu season. 3. Some people should not get this vaccine    Tell the person who is giving you the vaccine:     If you have any severe, life-threatening allergies. If you ever had a life-threatening allergic reaction after a dose of flu vaccine, or have a severe allergy to any part of this vaccine, you may be advised not to get vaccinated. Most, but not all, types of flu vaccine contain a small amount of egg protein.  If you ever had Guillain-Barré Syndrome (also called GBS). Some people with a history of GBS should not get this vaccine. This should be discussed with your doctor.  If you are not feeling well. It is usually okay to get flu vaccine when you have a mild illness, but you might be asked to come back when you feel better. 4. Risks of a vaccine reaction    With any medicine, including vaccines, there is a chance of reactions. These are usually mild and go away on their own, but serious reactions are also possible. Most people who get a flu shot do not have any problems with it.      Minor problems following a flu shot include:    soreness, redness, or swelling where the shot was given     hoarseness   sore, red or itchy eyes   cough   fever   aches   headache   itching   fatigue  If these problems occur, they usually begin soon after the shot and last 1 or 2 days. More serious problems following a flu shot can include the following:     There may be a small increased risk of Guillain-Barré Syndrome (GBS) after inactivated flu vaccine. This risk has been estimated at 1 or 2 additional cases per million people vaccinated. This is much lower than the risk of severe complications from flu, which can be prevented by flu vaccine.  Young children who get the flu shot along with pneumococcal vaccine (PCV13) and/or DTaP vaccine at the same time might be slightly more likely to have a seizure caused by fever. Ask your doctor for more information. Tell your doctor if a child who is getting flu vaccine has ever had a seizure. Problems that could happen after any injected vaccine:      People sometimes faint after a medical procedure, including vaccination. Sitting or lying down for about 15 minutes can help prevent fainting, and injuries caused by a fall. Tell your doctor if you feel dizzy, or have vision changes or ringing in the ears.  Some people get severe pain in the shoulder and have difficulty moving the arm where a shot was given. This happens very rarely.  Any medication can cause a severe allergic reaction. Such reactions from a vaccine are very rare, estimated at about 1 in a million doses, and would happen within a few minutes to a few hours after the vaccination. As with any medicine, there is a very remote chance of a vaccine causing a serious injury or death. The safety of vaccines is always being monitored. For more information, visit: www.cdc.gov/vaccinesafety/    5. What if there is a serious reaction? What should I look for?  Look for anything that concerns you, such as signs of a severe allergic reaction, very high fever, or unusual behavior.     Signs of a severe allergic reaction can include hives, swelling of the face and throat, difficulty breathing, a fast heartbeat, dizziness, and weakness  usually within a few minutes to a few hours after the vaccination. What should I do?  If you think it is a severe allergic reaction or other emergency that cant wait, call 9-1-1 and get the person to the nearest hospital. Otherwise, call your doctor.  Reactions should be reported to the Vaccine Adverse Event Reporting System (VAERS). Your doctor should file this report, or you can do it yourself through  the VAERS web site at www.vaers. [facility].gov, or by calling 6-718.334.4746. VAERS does not give medical advice. 6. The National Vaccine Injury Compensation Program    The [facility] Vaccine Injury Compensation Program (VICP) is a federal program that was created to compensate people who may have been injured by certain vaccines. Persons who believe they may have been injured by a vaccine can learn about the program and about filing a claim by calling 4-138.667.6083 or visiting the [employer] website at www.[facility].gov/vaccinecompensation. There is a time limit to file a claim for compensation. 7. How can I learn more?  Ask your healthcare provider. He or she can give you the vaccine package insert or suggest other sources of information.  Call your local or state health department.  Contact the Centers for Disease Control and Prevention (CDC):  - Call 1-142.716.6115 (1-800-CDC-INFO) or  - Visit CDCs website at www.cdc.gov/flu    Vaccine Information Statement   Inactivated Influenza Vaccine   8/7/2015  42 U. Navneet Cons [id number]    Department of Health and Human Services  Centers for Disease Control and Prevention    Office Use Only

## 2017-09-30 LAB
ALBUMIN SERPL-MCNC: 4.5 G/DL (ref 3.5–4.7)
ALBUMIN/GLOB SERPL: 1.8 {RATIO} (ref 1.2–2.2)
ALP SERPL-CCNC: 64 IU/L (ref 39–117)
ALT SERPL-CCNC: 13 IU/L (ref 0–32)
AST SERPL-CCNC: 23 IU/L (ref 0–40)
BASOPHILS # BLD AUTO: 0 X10E3/UL (ref 0–0.2)
BASOPHILS NFR BLD AUTO: 0 %
BILIRUB SERPL-MCNC: 0.4 MG/DL (ref 0–1.2)
BUN SERPL-MCNC: 22 MG/DL (ref 8–27)
BUN/CREAT SERPL: 25 (ref 12–28)
CALCIUM SERPL-MCNC: 9.6 MG/DL (ref 8.7–10.3)
CHLORIDE SERPL-SCNC: 101 MMOL/L (ref 96–106)
CHOLEST SERPL-MCNC: 215 MG/DL (ref 100–199)
CO2 SERPL-SCNC: 25 MMOL/L (ref 18–29)
CREAT SERPL-MCNC: 0.87 MG/DL (ref 0.57–1)
EOSINOPHIL # BLD AUTO: 0.1 X10E3/UL (ref 0–0.4)
EOSINOPHIL NFR BLD AUTO: 2 %
ERYTHROCYTE [DISTWIDTH] IN BLOOD BY AUTOMATED COUNT: 13.7 % (ref 12.3–15.4)
GLOBULIN SER CALC-MCNC: 2.5 G/DL (ref 1.5–4.5)
GLUCOSE SERPL-MCNC: 89 MG/DL (ref 65–99)
HCT VFR BLD AUTO: 36 % (ref 34–46.6)
HDLC SERPL-MCNC: 77 MG/DL
HGB BLD-MCNC: 12 G/DL (ref 11.1–15.9)
IMM GRANULOCYTES # BLD: 0 X10E3/UL (ref 0–0.1)
IMM GRANULOCYTES NFR BLD: 0 %
INTERPRETATION, 910389: NORMAL
LDLC SERPL CALC-MCNC: 122 MG/DL (ref 0–99)
LYMPHOCYTES # BLD AUTO: 1.7 X10E3/UL (ref 0.7–3.1)
LYMPHOCYTES NFR BLD AUTO: 32 %
MCH RBC QN AUTO: 30.5 PG (ref 26.6–33)
MCHC RBC AUTO-ENTMCNC: 33.3 G/DL (ref 31.5–35.7)
MCV RBC AUTO: 91 FL (ref 79–97)
MONOCYTES # BLD AUTO: 0.6 X10E3/UL (ref 0.1–0.9)
MONOCYTES NFR BLD AUTO: 12 %
NEUTROPHILS # BLD AUTO: 2.7 X10E3/UL (ref 1.4–7)
NEUTROPHILS NFR BLD AUTO: 54 %
PLATELET # BLD AUTO: 222 X10E3/UL (ref 150–379)
POTASSIUM SERPL-SCNC: 4.5 MMOL/L (ref 3.5–5.2)
PROT SERPL-MCNC: 7 G/DL (ref 6–8.5)
RBC # BLD AUTO: 3.94 X10E6/UL (ref 3.77–5.28)
SODIUM SERPL-SCNC: 139 MMOL/L (ref 134–144)
TRIGL SERPL-MCNC: 80 MG/DL (ref 0–149)
TSH SERPL DL<=0.005 MIU/L-ACNC: 2.3 UIU/ML (ref 0.45–4.5)
VLDLC SERPL CALC-MCNC: 16 MG/DL (ref 5–40)
WBC # BLD AUTO: 5.1 X10E3/UL (ref 3.4–10.8)

## 2017-10-19 ENCOUNTER — OFFICE VISIT (OUTPATIENT)
Dept: FAMILY MEDICINE CLINIC | Age: 82
End: 2017-10-19

## 2017-10-19 ENCOUNTER — HOSPITAL ENCOUNTER (OUTPATIENT)
Dept: LAB | Age: 82
Discharge: HOME | End: 2017-10-19
Payer: MEDICARE

## 2017-10-19 VITALS
BODY MASS INDEX: 25.87 KG/M2 | DIASTOLIC BLOOD PRESSURE: 51 MMHG | WEIGHT: 146 LBS | HEIGHT: 63 IN | TEMPERATURE: 98.1 F | OXYGEN SATURATION: 97 % | RESPIRATION RATE: 19 BRPM | HEART RATE: 77 BPM | SYSTOLIC BLOOD PRESSURE: 131 MMHG

## 2017-10-19 DIAGNOSIS — R10.31 RIGHT LOWER QUADRANT ABDOMINAL PAIN: Primary | ICD-10-CM

## 2017-10-19 DIAGNOSIS — I48.20 CHRONIC ATRIAL FIBRILLATION (HCC): ICD-10-CM

## 2017-10-19 LAB
BILIRUB UR QL STRIP: NORMAL
GLUCOSE UR-MCNC: NEGATIVE MG/DL
INR BLD: 3.1
KETONES P FAST UR STRIP-MCNC: NORMAL MG/DL
PH UR STRIP: 5 [PH] (ref 4.6–8)
PROT UR QL STRIP: NEGATIVE MG/DL
PT POC: 36.8 SECONDS
SP GR UR STRIP: 1.02 (ref 1–1.03)
UA UROBILINOGEN AMB POC: NORMAL (ref 0.2–1)
URINALYSIS CLARITY POC: CLEAR
URINALYSIS COLOR POC: YELLOW
URINE BLOOD POC: NEGATIVE
URINE LEUKOCYTES POC: NORMAL
URINE NITRITES POC: NEGATIVE
VALID INTERNAL CONTROL?: YES

## 2017-10-19 PROCEDURE — 36415 COLL VENOUS BLD VENIPUNCTURE: CPT

## 2017-10-19 PROCEDURE — 80053 COMPREHEN METABOLIC PANEL: CPT

## 2017-10-19 PROCEDURE — 85025 COMPLETE CBC W/AUTO DIFF WBC: CPT

## 2017-10-19 NOTE — PATIENT INSTRUCTIONS
CT scan scheduled tomorrow 10-19-17 at [facility], [address] [facility] [facility],[address], outpatient registration. Please arrive at 11:30 to register and drink barium for a 1:00 test.  Nothing to EAT/DRINK after 9:00am.           Abdominal Pain: Care Instructions  Your Care Instructions    Abdominal pain has many possible causes. Some aren't serious and get better on their own in a few days. Others need more testing and treatment. If your pain continues or gets worse, you need to be rechecked and may need more tests to find out what is wrong. You may need surgery to correct the problem. Don't ignore new symptoms, such as fever, nausea and vomiting, urination problems, pain that gets worse, and dizziness. These may be signs of a more serious problem. Your doctor may have recommended a follow-up visit in the next 8 to 12 hours. If you are not getting better, you may need more tests or treatment. The doctor has checked you carefully, but problems can develop later. If you notice any problems or new symptoms, get medical treatment right away. Follow-up care is a key part of your treatment and safety. Be sure to make and go to all appointments, and call your doctor if you are having problems. It's also a good idea to know your test results and keep a list of the medicines you take. How can you care for yourself at home? · Rest until you feel better. · To prevent dehydration, drink plenty of fluids, enough so that your urine is light yellow or clear like water. Choose water and other caffeine-free clear liquids until you feel better. If you have kidney, heart, or liver disease and have to limit fluids, talk with your doctor before you increase the amount of fluids you drink. · If your stomach is upset, eat mild foods, such as rice, dry toast or crackers, bananas, and applesauce. Try eating several small meals instead of two or three large ones.   · Wait until 48 hours after all symptoms have gone away before you have spicy foods, alcohol, and drinks that contain caffeine. · Do not eat foods that are high in fat. · Avoid anti-inflammatory medicines such as aspirin, ibuprofen (Advil, Motrin), and naproxen (Aleve). These can cause stomach upset. Talk to your doctor if you take daily aspirin for another health problem. When should you call for help? Call 911 anytime you think you may need emergency care. For example, call if:  · You passed out (lost consciousness). · You pass maroon or very bloody stools. · You vomit blood or what looks like coffee grounds. · You have new, severe belly pain. Call your doctor now or seek immediate medical care if:  · Your pain gets worse, especially if it becomes focused in one area of your belly. · You have a new or higher fever. · Your stools are black and look like tar, or they have streaks of blood. · You have unexpected vaginal bleeding. · You have symptoms of a urinary tract infection. These may include:  ¨ Pain when you urinate. ¨ Urinating more often than usual.  ¨ Blood in your urine. · You are dizzy or lightheaded, or you feel like you may faint. Watch closely for changes in your health, and be sure to contact your doctor if:  · You are not getting better after 1 day (24 hours). Where can you learn more? Go to http://jemima-lousia.info/. Enter Y454 in the search box to learn more about \"Abdominal Pain: Care Instructions. \"  Current as of: March 20, 2017  Content Version: 11.3  © 7093-5970 [employer]. Care instructions adapted under license by [employer] (which disclaims liability or warranty for this information). If you have questions about a medical condition or this instruction, always ask your healthcare professional. [address] any warranty or liability for your use of this information.

## 2017-10-19 NOTE — MR AVS SNAPSHOT
Visit Information Date & Time Provider Department Dept. Phone Encounter #  
 10/19/2017  1:15 PM [phone] [id number] Your Appointments 11/2/2017 10:00 AM  
Nurse Visit with Jani Oliver Tj Fam [place] [address] ([address]) Appt Note: ptinr [address], [address] [id number]  
[address], [place] [address] [address]  
  
    
 3/30/2018  9:30 AM  
PHYSICAL PRE OP with MD Ivon Hutton. [address] [place] [address] ([address]) Appt Note: 6 month f/u bp $0cp w[facility] [address], [address] [place] [address] [address]  
[phone]  
  
   
 [address], [place] [address] [address] Upcoming Health Maintenance Date Due  
 GLAUCOMA SCREENING Q2Y 5/27/2017 MEDICARE YEARLY EXAM 3/24/2018 DTaP/Tdap/Td series (2 - Td) 10/22/2024 Allergies as of 10/19/2017  Review Complete On: 10/19/2017 By: Pieter Nicolas MD  
 No Known Allergies Current Immunizations  Reviewed on 3/23/2017 Name Date Influenza High Dose Vaccine PF 9/29/2017, 9/22/2016, 10/20/2015, 10/22/2014 Influenza Vaccine 12/6/2013 Pneumococcal Conjugate (PCV-13) 3/23/2017 Pneumococcal Polysaccharide (PPSV-23) 12/6/2013 Tdap 10/22/2014 Not reviewed this visit You Were Diagnosed With   
  
 Codes Comments Right lower quadrant abdominal pain    -  Primary ICD-10-CM: R10.31 ICD-9-CM: 789.03 Chronic atrial fibrillation (HCC)     ICD-10-CM: O93.5 ICD-9-CM: 427.31 Vitals BP Pulse Temp Resp Height(growth percentile) Weight(growth percentile) 131/51 (BP 1 Location: Left arm, BP Patient Position: Sitting) 77 98.1 °F (36.7 °C) (Oral) 19 5' 3\" (1.6 m) 146 lb (66.2 kg) SpO2 BMI OB Status Smoking Status 97% 25.86 kg/m2 Postmenopausal Never Smoker Vitals History BMI and BSA Data Body Mass Index Body Surface Area 25.86 kg/m 2 1.72 m 2 Preferred Pharmacy Pharmacy Name Phone [facility], [address] [phone] Your Updated Medication List  
  
   
This list is accurate as of: 10/19/17  2:08 PM.  Always use your most recent med list. amLODIPine 5 mg tablet Commonly known as:  Love Breach Take 1 Tab by mouth daily. CARDURA 4 mg tablet Generic drug:  doxazosin Take 1 mg by mouth two (2) times a day. COUMADIN 5 mg tablet Generic drug:  warfarin Take 5 mg by mouth daily. Take one and a half tablets, 7.5 mg on Tuesday and Thursday Saturday, and Sunday Take one tablet, 5mg all other days. flecainide 100 mg tablet Commonly known as:  TAMBOCOR Take 1 Tab by mouth three (3) times daily. hydroCHLOROthiazide 25 mg tablet Commonly known as:  HYDRODIURIL Take 1 Tab by mouth daily. levothyroxine 50 mcg tablet Commonly known as:  SYNTHROID  
TAKE 1 TABLET BY MOUTH EVERY MORNING BEFORE BREAKFAST FOR THYROID October 2017 Details Sun Mon Tue Wed Thu Fri Sat  
  1  
  
  
  
   2  
  
  
  
   3  
  
  
  
   4  
  
  
  
   5  
  
  
  
   6  
  
  
  
   7  
  
  
  
  
  8  
  
  
  
   9  
  
  
  
   10  
  
  
  
   11  
  
  
  
   12  
  
  
  
   13  
  
  
  
   14  
  
  
  
  
  15  
  
  
  
   16  
  
  
  
   17  
  
  
  
   18  
  
  
  
   19  
  
7.5 mg  
See details 20  
  
5 mg  
  
   21  
  
7.5 mg  
  
  
  22  
  
7.5 mg  
  
   23  
  
5 mg  
  
   24  
  
7.5 mg  
  
   25  
  
5 mg  
  
   26  
  
7.5 mg  
  
   27  
  
5 mg  
  
   28  
  
7.5 mg  
  
  
  29  
  
7.5 mg  
  
   30  
  
5 mg  
  
   31  
  
7.5 mg Date Details 10/19 This INR check INR: 3.1 Date of next INR: No date specified How to take your warfarin dose To take:  5 mg Take one of the 5 mg tablets. To take:  7.5 mg Take one and a half of the 5 mg tablets. We Performed the Following AMB POC PT/INR [00598 CPT(R)] AMB POC URINALYSIS DIP STICK AUTO W/O MICRO [11082 CPT(R)] CBC WITH AUTOMATED DIFF [66042 CPT(R)] METABOLIC PANEL, COMPREHENSIVE [84655 CPT(R)] To-Do List   
 10/19/2017 Imaging:  CT ABD PELV W CONT   
  
 10/20/2017 1:00 PM  
  Appointment with [facility] CT ER 1 at [facility] RAD CT ([phone]) CONTRAST STUDY: 1. The patient should not eat solid food four hours before the appointment but should be encouraged to drink clear liquids. 2.  If you have to drink oral contrast, please pick it up any weekday prior to your appointment, if you cannot please check in 2 hrs before appt time. 3.  The patient will require IV access for contrast administration. 4.  The patient should not take Ibuprofen (Advil, Motrin, etc.) and Naproxen Sodium (Aleve, etc.)  on the day of the exam. Stopping non-steroidal anti-inflammatory agents (NSAIDs) like Ibuprofen decreases the risk of kidney damage from the x-ray contrast (dye). 5.  Bring any non [facility] facility films/images pertaining to the area of interest with you on the day of appointment. 6.  Bring current lab work if available (within last 90 days CMP) ***If scheduled at [facility], iSTAT is not available, labs will need to be done before appointment*** 7. Check in at registration at least 30 minutes before appt time unless you were instructed to do otherwise. Patient Instructions CT scan scheduled tomorrow 10-19-17 at [facility], [address] [place] [address],[address], outpatient registration. Please arrive at 11:30 to register and drink barium for a 1:00 test.  Nothing to EAT/DRINK after 9:00am.   
 
 
  
Abdominal Pain: Care Instructions Your Care Instructions Abdominal pain has many possible causes. Some aren't serious and get better on their own in a few days. Others need more testing and treatment.  If your pain continues or gets worse, you need to be rechecked and may need more tests to find out what is wrong. You may need surgery to correct the problem. Don't ignore new symptoms, such as fever, nausea and vomiting, urination problems, pain that gets worse, and dizziness. These may be signs of a more serious problem. Your doctor may have recommended a follow-up visit in the next 8 to 12 hours. If you are not getting better, you may need more tests or treatment. The doctor has checked you carefully, but problems can develop later. If you notice any problems or new symptoms, get medical treatment right away. Follow-up care is a key part of your treatment and safety. Be sure to make and go to all appointments, and call your doctor if you are having problems. It's also a good idea to know your test results and keep a list of the medicines you take. How can you care for yourself at home? · Rest until you feel better. · To prevent dehydration, drink plenty of fluids, enough so that your urine is light yellow or clear like water. Choose water and other caffeine-free clear liquids until you feel better. If you have kidney, heart, or liver disease and have to limit fluids, talk with your doctor before you increase the amount of fluids you drink. · If your stomach is upset, eat mild foods, such as rice, dry toast or crackers, bananas, and applesauce. Try eating several small meals instead of two or three large ones. · Wait until 48 hours after all symptoms have gone away before you have spicy foods, alcohol, and drinks that contain caffeine. · Do not eat foods that are high in fat. · Avoid anti-inflammatory medicines such as aspirin, ibuprofen (Advil, Motrin), and naproxen (Aleve). These can cause stomach upset. Talk to your doctor if you take daily aspirin for another health problem. When should you call for help? Call 911 anytime you think you may need emergency care. For example, call if: 
· You passed out (lost consciousness). · You pass maroon or very bloody stools. · You vomit blood or what looks like coffee grounds. · You have new, severe belly pain. Call your doctor now or seek immediate medical care if: 
· Your pain gets worse, especially if it becomes focused in one area of your belly. · You have a new or higher fever. · Your stools are black and look like tar, or they have streaks of blood. · You have unexpected vaginal bleeding. · You have symptoms of a urinary tract infection. These may include: 
¨ Pain when you urinate. ¨ Urinating more often than usual. 
¨ Blood in your urine. · You are dizzy or lightheaded, or you feel like you may faint. Watch closely for changes in your health, and be sure to contact your doctor if: 
· You are not getting better after 1 day (24 hours). Where can you learn more? Go to http://jemima-louisa.info/. Enter C415 in the search box to learn more about \"Abdominal Pain: Care Instructions. \" Current as of: March 20, 2017 Content Version: 11.3 © 6273-0710 [employer]. Care instructions adapted under license by [employer] (which disclaims liability or warranty for this information). If you have questions about a medical condition or this instruction, always ask your healthcare professional. [address] any warranty or liability for your use of this information. Introducing [facility] & HEALTH SERVICES! Ton Villarreal introduces [employer] patient portal. Now you can access parts of your medical record, email your doctor's office, and request medication refills online. 1. In your internet browser, go to [email or web address] 2. Click on the First Time User? Click Here link in the Sign In box. You will see the New Member Sign Up page. 3. Enter your [employer] Access Code exactly as it appears below. You will not need to use this code after youve completed the sign-up process. If you do not sign up before the expiration date, you must request a new code. · [employer] Access Code: [email or web address] Expires: 11/12/2017 10:05 AM 
 
4. Enter the last four digits of your Social Security Number (xxxx) and Date of Birth (mm/dd/yyyy) as indicated and click Submit. You will be taken to the next sign-up page. 5. Create a [employer] ID. This will be your [employer] login ID and cannot be changed, so think of one that is secure and easy to remember. 6. Create a [employer] password. You can change your password at any time. 7. Enter your Password Reset Question and Answer. This can be used at a later time if you forget your password. 8. Enter your e-mail address. You will receive e-mail notification when new information is available in [address] 9. Click Sign Up. You can now view and download portions of your medical record. 10. Click the Download Summary menu link to download a portable copy of your medical information. If you have questions, please visit the Frequently Asked Questions section of the [employer] website. Remember, [employer] is NOT to be used for urgent needs. For medical emergencies, dial 911. Now available from your iPhone and Android! Please provide this summary of care documentation to your next provider. Your primary care clinician is listed as Shawna Trinidad. If you have any questions after today's visit, please call [phone].

## 2017-10-19 NOTE — PROGRESS NOTES
86yo with right groin pain for 2 weeks. Tried some tylenol. Yesterday it got so bad she could not bear weight on her right leg when she got out of bed. She tried to figure out where exactly it hurts and has decided the right lower part of her abdomen is the culprit. It is tender to touch herself there. She had an appendectomy long ago. She still has ovaries and uterus. She denies GI or  symptoms. She has chronic constipation. She's had no fever. Past Medical History, Surgical History, and Social History reviewed. Medication list reviewed. No Known Allergies    Visit Vitals    /51 (BP 1 Location: Left arm, BP Patient Position: Sitting)    Pulse 77    Temp 98.1 °F (36.7 °C) (Oral)    Resp 19    Ht 5' 3\" (1.6 m)    Wt 146 lb (66.2 kg)    SpO2 97%    BMI 25.86 kg/m2     Gen: alert, oriented, no acute distress  Lungs: no increased respiratory effort, clear to ausculation bilaterally  Heart: regular rate and rhythm, no murmurs, rubs or gallops  Abdomen: soft, RLQ and right periumbilical tender to palpation  No rebound or guarding. There is a fullness in this area. Extremities:right hip FROM without pain. No groin pain. Assessment/Plan:    1. Right lower quadrant abdominal pain  Concerning for mass vs adhesion. Does not appear acute today, and not impacting bowel function. Will get nonemergent CT and labs. UA is normal.   - CBC WITH AUTOMATED DIFF  - METABOLIC PANEL, COMPREHENSIVE  - AMB POC URINALYSIS DIP STICK AUTO W/O MICRO  - CT ABD PELV W CONT; Future    2. Chronic atrial fibrillation (HCC)  INR 3.1. Decrease coumading dose back to 5mg daily except Tues and Thurs 7.5.  - AMB POC PT/INR        Orders Placed This Encounter    CT ABD PELV W CONT     Standing Status:   Future     Standing Expiration Date:   11/19/2018     Order Specific Question:   Is Patient Allergic to Contrast Dye? Answer:   No     Order Specific Question:   STAT Creatinine as indicated     Answer:    Yes  CBC WITH AUTOMATED DIFF    METABOLIC PANEL, COMPREHENSIVE    AMB POC PT/INR    AMB POC URINALYSIS DIP STICK AUTO W/O MICRO       Current Outpatient Prescriptions   Medication Sig Dispense Refill    levothyroxine (SYNTHROID) 50 mcg tablet TAKE 1 TABLET BY MOUTH EVERY MORNING BEFORE BREAKFAST FOR THYROID 90 Tab 3    hydroCHLOROthiazide (HYDRODIURIL) 25 mg tablet Take 1 Tab by mouth daily. 90 Tab 3    flecainide (TAMBOCOR) 100 mg tablet Take 1 Tab by mouth three (3) times daily. 270 Tab 1    warfarin (COUMADIN) 5 mg tablet Take 5 mg by mouth daily. Take one and a half tablets, 7.5 mg on Tuesday and Thursday Saturday, and Sunday Take one tablet, 5mg all other days.  amLODIPine (NORVASC) 5 mg tablet Take 1 Tab by mouth daily. 90 Tab 3    doxazosin (CARDURA) 4 mg tablet Take 1 mg by mouth two (2) times a day. Verbal and written instructions (see AVS) provided. Patient expresses understanding of diagnosis and treatment plan.

## 2017-10-20 ENCOUNTER — HOSPITAL ENCOUNTER (OUTPATIENT)
Dept: CT IMAGING | Age: 82
Discharge: HOME | End: 2017-10-20
Attending: INTERNAL MEDICINE
Payer: MEDICARE

## 2017-10-20 DIAGNOSIS — R10.31 RIGHT LOWER QUADRANT ABDOMINAL PAIN: ICD-10-CM

## 2017-10-20 LAB
ALBUMIN SERPL-MCNC: 4.1 G/DL (ref 3.5–4.7)
ALBUMIN/GLOB SERPL: 1.4 {RATIO} (ref 1.2–2.2)
ALP SERPL-CCNC: 74 IU/L (ref 39–117)
ALT SERPL-CCNC: 11 IU/L (ref 0–32)
AST SERPL-CCNC: 15 IU/L (ref 0–40)
BASOPHILS # BLD AUTO: 0 X10E3/UL (ref 0–0.2)
BASOPHILS NFR BLD AUTO: 0 %
BILIRUB SERPL-MCNC: 0.3 MG/DL (ref 0–1.2)
BUN SERPL-MCNC: 24 MG/DL (ref 8–27)
BUN/CREAT SERPL: 24 (ref 12–28)
CALCIUM SERPL-MCNC: 7.9 MG/DL (ref 8.7–10.3)
CHLORIDE SERPL-SCNC: 96 MMOL/L (ref 96–106)
CO2 SERPL-SCNC: 27 MMOL/L (ref 18–29)
CREAT SERPL-MCNC: 1.02 MG/DL (ref 0.57–1)
EOSINOPHIL # BLD AUTO: 0.1 X10E3/UL (ref 0–0.4)
EOSINOPHIL NFR BLD AUTO: 1 %
ERYTHROCYTE [DISTWIDTH] IN BLOOD BY AUTOMATED COUNT: 13.9 % (ref 12.3–15.4)
GFR SERPLBLD CREATININE-BSD FMLA CKD-EPI: 50 ML/MIN/1.73
GFR SERPLBLD CREATININE-BSD FMLA CKD-EPI: 58 ML/MIN/1.73
GLOBULIN SER CALC-MCNC: 3 G/DL (ref 1.5–4.5)
GLUCOSE SERPL-MCNC: 106 MG/DL (ref 65–99)
HCT VFR BLD AUTO: 33.9 % (ref 34–46.6)
HGB BLD-MCNC: 11.5 G/DL (ref 11.1–15.9)
IMM GRANULOCYTES # BLD: 0 X10E3/UL (ref 0–0.1)
IMM GRANULOCYTES NFR BLD: 0 %
INTERPRETATION: NORMAL
LYMPHOCYTES # BLD AUTO: 2 X10E3/UL (ref 0.7–3.1)
LYMPHOCYTES NFR BLD AUTO: 17 %
MCH RBC QN AUTO: 31 PG (ref 26.6–33)
MCHC RBC AUTO-ENTMCNC: 33.9 G/DL (ref 31.5–35.7)
MCV RBC AUTO: 91 FL (ref 79–97)
MONOCYTES # BLD AUTO: 1.3 X10E3/UL (ref 0.1–0.9)
MONOCYTES NFR BLD AUTO: 11 %
NEUTROPHILS # BLD AUTO: 8.3 X10E3/UL (ref 1.4–7)
NEUTROPHILS NFR BLD AUTO: 71 %
PLATELET # BLD AUTO: 287 X10E3/UL (ref 150–379)
POTASSIUM SERPL-SCNC: 4.8 MMOL/L (ref 3.5–5.2)
PROT SERPL-MCNC: 7.1 G/DL (ref 6–8.5)
RBC # BLD AUTO: 3.71 X10E6/UL (ref 3.77–5.28)
SODIUM SERPL-SCNC: 138 MMOL/L (ref 134–144)
WBC # BLD AUTO: 11.8 X10E3/UL (ref 3.4–10.8)

## 2017-10-20 PROCEDURE — 74011636320 HC RX REV CODE- 636/320: Performed by: INTERNAL MEDICINE

## 2017-10-20 PROCEDURE — 74177 CT ABD & PELVIS W/CONTRAST: CPT

## 2017-10-20 PROCEDURE — 74011000258 HC RX REV CODE- 258: Performed by: INTERNAL MEDICINE

## 2017-10-20 RX ORDER — SODIUM CHLORIDE 0.9 % (FLUSH) 0.9 %
10 SYRINGE (ML) INJECTION
Status: COMPLETED | OUTPATIENT
Start: 2017-10-20 | End: 2017-10-20

## 2017-10-20 RX ADMIN — IOHEXOL 50 ML: 240 INJECTION, SOLUTION INTRATHECAL; INTRAVASCULAR; INTRAVENOUS; ORAL at 11:40

## 2017-10-20 RX ADMIN — Medication 10 ML: at 13:33

## 2017-10-20 RX ADMIN — SODIUM CHLORIDE 100 ML: 900 INJECTION, SOLUTION INTRAVENOUS at 13:33

## 2017-10-20 RX ADMIN — IOPAMIDOL 100 ML: 755 INJECTION, SOLUTION INTRAVENOUS at 13:33

## 2017-10-20 NOTE — PROGRESS NOTES
RLQ hernia - containing fat only at this point. Conservative management. I called daughter, Elmer Andrade.

## 2017-10-23 RX ORDER — DOXAZOSIN 8 MG/1
TABLET ORAL
Qty: 90 TAB | Refills: 3 | Status: SHIPPED | OUTPATIENT
Start: 2017-10-23 | End: 2019-04-10 | Stop reason: SDUPTHER

## 2017-10-23 NOTE — TELEPHONE ENCOUNTER
I have called and verified dosage with patient and daughter. Please send to [facility].

## 2017-11-02 ENCOUNTER — CLINICAL SUPPORT (OUTPATIENT)
Dept: FAMILY MEDICINE CLINIC | Age: 82
End: 2017-11-02

## 2017-11-02 VITALS
RESPIRATION RATE: 20 BRPM | TEMPERATURE: 98.5 F | WEIGHT: 145 LBS | DIASTOLIC BLOOD PRESSURE: 56 MMHG | BODY MASS INDEX: 25.69 KG/M2 | HEIGHT: 63 IN | OXYGEN SATURATION: 96 % | SYSTOLIC BLOOD PRESSURE: 127 MMHG | HEART RATE: 72 BPM

## 2017-11-02 DIAGNOSIS — Z79.01 ANTICOAGULATION GOAL OF INR 2 TO 3: ICD-10-CM

## 2017-11-02 DIAGNOSIS — Z51.81 ANTICOAGULATION GOAL OF INR 2 TO 3: ICD-10-CM

## 2017-11-02 DIAGNOSIS — I48.20 CHRONIC ATRIAL FIBRILLATION (HCC): Primary | ICD-10-CM

## 2017-11-02 LAB
INR BLD: 3.6
PT POC: 43.3 SECONDS
VALID INTERNAL CONTROL?: YES

## 2017-11-02 NOTE — MR AVS SNAPSHOT
recent med list. amLODIPine 5 mg tablet Commonly known as:  Sen Nichols Take 1 Tab by mouth daily. COUMADIN 5 mg tablet Generic drug:  warfarin Take 5 mg by mouth daily. Take one and a half tablets, 7.5 mg on Tuesday and Thursday Saturday, and Sunday Take one tablet, 5mg all other days. doxazosin 8 mg tablet Commonly known as:  CARDURA Take 0.5 tablet twice daily. flecainide 100 mg tablet Commonly known as:  TAMBOCOR Take 1 Tab by mouth three (3) times daily. hydroCHLOROthiazide 25 mg tablet Commonly known as:  HYDRODIURIL Take 1 Tab by mouth daily. levothyroxine 50 mcg tablet Commonly known as:  SYNTHROID  
TAKE 1 TABLET BY MOUTH EVERY MORNING BEFORE BREAKFAST FOR THYROID Description 11/2/17 hold tonight's dose then change coumadin to 7.5 mg on Saturdays and Sundays and 5 mg the rest of the week. November 2017 Details Katharina Prior Tue Wed Thu Fri Sat  
     1  
  
  
  
   2  
  
5 mg See details 3  
  
5 mg  
  
   4  
  
7.5 mg  
  
  
  5  
  
7.5 mg  
  
   6  
  
5 mg  
  
   7  
  
5 mg  
  
   8  
  
5 mg  
  
   9  
  
5 mg  
  
   10  
  
5 mg  
  
   11  
  
7.5 mg  
  
  
  12  
  
7.5 mg  
  
   13  
  
5 mg  
  
   14  
  
5 mg  
  
   15  
  
5 mg  
  
   16  
  
5 mg  
  
   17  
  
  
  
   18  
  
  
  
  
  19  
  
  
  
   20  
  
  
  
   21  
  
  
  
   22  
  
  
  
   23  
  
  
  
   24  
  
  
  
   25  
  
  
  
  
  26  
  
  
  
   27  
  
  
  
   28  
  
  
  
   29  
  
  
  
   30  
  
  
  
    
 Date Details 11/02 This INR check INR: 3.6 Date of next INR:  11/16/2017 How to take your warfarin dose To take:  5 mg Take one of the 5 mg tablets. To take:  7.5 mg Take one and a half of the 5 mg tablets. We Performed the Following AMB POC PT/INR [23015 CPT(R)] Introducing [facility] & HE[facility] SERVICES! Brionna Fisher introduces [employer] patient portal. Now you can access parts of your medical record, email your doctor's office, and request medication refills online. 1. In your internet browser, go to [email or web address] 2. Click on the First Time User? Click Here link in the Sign In box. You will see the New Member Sign Up page. 3. Enter your [employer] Access Code exactly as it appears below. You will not need to use this code after youve completed the sign-up process. If you do not sign up before the expiration date, you must request a new code. · [employer] Access Code: [email or web address] Expires: 11/12/2017 10:05 AM 
 
4. Enter the last four digits of your Social Security Number (xxxx) and Date of Birth (mm/dd/yyyy) as indicated and click Submit. You will be taken to the next sign-up page. 5. Create a [employer] ID. This will be your [employer] login ID and cannot be changed, so think of one that is secure and easy to remember. 6. Create a [employer] password. You can change your password at any time. 7. Enter your Password Reset Question and Answer. This can be used at a later time if you forget your password. 8. Enter your e-mail address. You will receive e-mail notification when new information is available in [address] 9. Click Sign Up. You can now view and download portions of your medical record. 10. Click the Download Summary menu link to download a portable copy of your medical information. If you have questions, please visit the Frequently Asked Questions section of the [employer] website. Remember, [employer] is NOT to be used for urgent needs. For medical emergencies, dial 911. Now available from your iPhone and Android! Please provide this summary of care documentation to your next provider. Your primary care clinician is listed as Demetrius Fatima. If you have any questions after today's visit, please call [phone].

## 2017-11-02 NOTE — PROGRESS NOTES
Patient presents for a PT/INR  See med list and patient instructions along with results for verbal orders by Dr. Gayle Hermosillo. Results for orders placed or performed in visit on 11/02/17   AMB POC PT/INR   Result Value Ref Range    VALID INTERNAL CONTROL POC Yes     Prothrombin time (POC) 43.3 seconds    INR POC 3.6      Vitals:    11/02/17 1002   BP: 127/56   Pulse: 72   Resp: 20   Temp: 98.5 °F (36.9 °C)   TempSrc: Oral   SpO2: 96%   Weight: 145 lb (65.8 kg)   Height: 5' 3\" (1.6 m)     Prior to Admission medications    Medication Sig Start Date End Date Taking? Authorizing Provider   doxazosin (CARDURA) 8 mg tablet Take 0.5 tablet twice daily. 10/23/17  Yes Pranay Templeton MD   levothyroxine (SYNTHROID) 50 mcg tablet TAKE 1 TABLET BY MOUTH EVERY MORNING BEFORE BREAKFAST FOR THYROID 6/20/17  Yes Pranay Templeton MD   hydroCHLOROthiazide (HYDRODIURIL) 25 mg tablet Take 1 Tab by mouth daily. 5/30/17  Yes Pranay Templeton MD   flecainide (TAMBOCOR) 100 mg tablet Take 1 Tab by mouth three (3) times daily. 5/9/17  Yes Paula Noguera NP   warfarin (COUMADIN) 5 mg tablet Take 5 mg by mouth daily. Take one and a half tablets, 7.5 mg on Tuesday and Thursday Saturday, and Sunday Take one tablet, 5mg all other days. Yes Historical Provider   amLODIPine (NORVASC) 5 mg tablet Take 1 Tab by mouth daily. 1/5/17  Yes Pranay Templeton MD     Per Dr. Meg Avendaño verbal order hold today's dose but she had already taken it, so she will hold tomorrow's dose. Dr. Gayle Hermosillo also adjusted coumadin dose to 7.5 mg on Saturdays and Sundays and 5 mg Monday-Fridays. See anti-coag episode for instructions which are on AVS.    AVS and verbal instructions given she voiced understanding of all instructions.

## 2017-11-13 ENCOUNTER — TELEPHONE (OUTPATIENT)
Dept: FAMILY MEDICINE CLINIC | Age: 82
End: 2017-11-13

## 2017-11-13 NOTE — TELEPHONE ENCOUNTER
Daughter Aliya(on disclosure) and advised mother has been coughing up greenish mucous. She is taking mucinex and daughter has advised patient she needs to be seen. Daughter reports this is the second time in  2 weeks she has had this problem. She refuses to come in to be seen for this. She is scheduled for nurse visit on 11/16/17. I switched to office visit to be seen by Dr Leon Jung. Please advise if medication could be prescribed prior to appointment on 11/16/17. Patient uses [facility].

## 2017-11-14 ENCOUNTER — OFFICE VISIT (OUTPATIENT)
Dept: FAMILY MEDICINE CLINIC | Age: 82
End: 2017-11-14

## 2017-11-14 VITALS
TEMPERATURE: 97.9 F | SYSTOLIC BLOOD PRESSURE: 162 MMHG | HEIGHT: 63 IN | OXYGEN SATURATION: 97 % | HEART RATE: 86 BPM | WEIGHT: 148 LBS | DIASTOLIC BLOOD PRESSURE: 58 MMHG | RESPIRATION RATE: 17 BRPM | BODY MASS INDEX: 26.22 KG/M2

## 2017-11-14 DIAGNOSIS — J40 BRONCHITIS: Primary | ICD-10-CM

## 2017-11-14 DIAGNOSIS — I48.20 CHRONIC ATRIAL FIBRILLATION (HCC): ICD-10-CM

## 2017-11-14 RX ORDER — DOXYCYCLINE 100 MG/1
100 TABLET ORAL 2 TIMES DAILY
Qty: 20 TAB | Refills: 0 | Status: SHIPPED | OUTPATIENT
Start: 2017-11-14 | End: 2017-11-24

## 2017-11-14 RX ORDER — AZITHROMYCIN 250 MG/1
TABLET, FILM COATED ORAL
Qty: 6 TAB | Refills: 0 | Status: SHIPPED | OUTPATIENT
Start: 2017-11-14 | End: 2017-11-14

## 2017-11-14 NOTE — MR AVS SNAPSHOT
Visit Information Date & Time Provider Department Dept. Phone Encounter #  
 11/14/2017  1:30 PM Mal Zavala [facility] 199 [phone] [id number] Your Appointments 11/16/2017  1:15 PM  
SAME DAY with MD Melody Islas [address] ([facility]) Appt Note: cough and congestion and PT/INR  
 [address], [address] Kayla Hammond [address] [address]  
[address], [place] [address] [address]  
  
    
 3/30/2018  9:30 AM  
PHYSICAL PRE OP with MD Melody Islas [address] ([facility]) Appt Note: 6 month f/u bp $0cp wmc [address], [address] Kayla Hammond Aydee Navarro [address]  
[phone]  
  
   
 [address], [address] [address] Upcoming Health Maintenance Date Due  
 GLAUCOMA SCREENING Q2Y 5/27/2017 MEDICARE YEARLY EXAM 3/24/2018 DTaP/Tdap/Td series (2 - Td) 10/22/2024 Allergies as of 11/14/2017  Review Complete On: 11/14/2017 By: Mal Zavala MD  
 No Known Allergies Current Immunizations  Reviewed on 3/23/2017 Name Date Influenza High Dose Vaccine PF 9/29/2017, 9/22/2016, 10/20/2015, 10/22/2014 Influenza Vaccine 12/6/2013 Pneumococcal Conjugate (PCV-13) 3/23/2017 Pneumococcal Polysaccharide (PPSV-23) 12/6/2013 Tdap 10/22/2014 Not reviewed this visit You Were Diagnosed With   
  
 Codes Comments Bronchitis    -  Primary ICD-10-CM: L18 ICD-9-CM: 367 Chronic atrial fibrillation (HCC)     ICD-10-CM: E23.4 ICD-9-CM: 427.31 Vitals BP Pulse Temp Resp Height(growth percentile) Weight(growth percentile) 162/58 86 97.9 °F (36.6 °C) (Oral) 17 5' 3\" (1.6 m) 148 lb (67.1 kg) SpO2 BMI OB Status Smoking Status 97% 26.22 kg/m2 Postmenopausal Never Smoker Vitals History BMI and BSA Data Body Mass Index Body Surface Area  
 26.22 kg/m 2 1.73 m 2 Preferred Pharmacy Pharmacy Name Phone [facility], [address] [phone] Your Updated Medication List  
  
   
This list is accurate as of: 11/14/17  1:58 PM.  Always use your most recent med list. amLODIPine 5 mg tablet Commonly known as:  Eek Citron Take 1 Tab by mouth daily. COUMADIN 5 mg tablet Generic drug:  warfarin Take 5 mg by mouth daily. Take one and a half tablets, 7.5 mg on Tuesday and Thursday Saturday, and Sunday Take one tablet, 5mg all other days. doxazosin 8 mg tablet Commonly known as:  CARDURA Take 0.5 tablet twice daily. doxycycline 100 mg tablet Commonly known as:  ADOXA Take 1 Tab by mouth two (2) times a day for 10 days. flecainide 100 mg tablet Commonly known as:  TAMBOCOR Take 1 Tab by mouth three (3) times daily. hydroCHLOROthiazide 25 mg tablet Commonly known as:  HYDRODIURIL Take 1 Tab by mouth daily. levothyroxine 50 mcg tablet Commonly known as:  SYNTHROID  
TAKE 1 TABLET BY MOUTH EVERY MORNING BEFORE BREAKFAST FOR THYROID Prescriptions Sent to Pharmacy Refills  
 doxycycline (ADOXA) 100 mg tablet 0 Sig: Take 1 Tab by mouth two (2) times a day for 10 days. Class: Normal  
 [facility] [address] [facility] #: [phone] Route: Oral  
  
Patient Instructions Bronchitis: Care Instructions Your Care Instructions Bronchitis is inflammation of the bronchial tubes, which carry air to the lungs. The tubes swell and produce mucus, or phlegm. The mucus and inflamed bronchial tubes make you cough. You may have trouble breathing. Most cases of bronchitis are caused by viruses like those that cause colds. Antibiotics usually do not help and they may be harmful. Bronchitis usually develops rapidly and lasts about 2 to 3 weeks in otherwise healthy people. Follow-up care is a key part of your treatment and safety. Be sure to make and go to all appointments, and call your doctor if you are having problems. It's also a good idea to know your test results and keep a list of the medicines you take. How can you care for yourself at home? · Take all medicines exactly as prescribed. Call your doctor if you think you are having a problem with your medicine. · Get some extra rest. 
· Take an over-the-counter pain medicine, such as acetaminophen (Tylenol), ibuprofen (Advil, Motrin), or naproxen (Aleve) to reduce fever and relieve body aches. Read and follow all instructions on the label. · Do not take two or more pain medicines at the same time unless the doctor told you to. Many pain medicines have acetaminophen, which is Tylenol. Too much acetaminophen (Tylenol) can be harmful. · Take an over-the-counter cough medicine that contains dextromethorphan to help quiet a dry, hacking cough so that you can sleep. Avoid cough medicines that have more than one active ingredient. Read and follow all instructions on the label. · Breathe moist air from a humidifier, hot shower, or sink filled with hot water. The heat and moisture will thin mucus so you can cough it out. · Do not smoke. Smoking can make bronchitis worse. If you need help quitting, talk to your doctor about stop-smoking programs and medicines. These can increase your chances of quitting for good. When should you call for help? Call 911 anytime you think you may need emergency care. For example, call if: 
? · You have severe trouble breathing. ?Call your doctor now or seek immediate medical care if: 
? · You have new or worse trouble breathing. ? · You cough up dark brown or bloody mucus (sputum). ? · You have a new or higher fever. ? · You have a new rash. ? Watch closely for changes in your health, and be sure to contact your doctor if: ? · You cough more deeply or more often, especially if you notice more mucus or a change in the color of your mucus. ? · You are not getting better as expected. Where can you learn more? Go to http://jemima-louisa.info/. Enter H333 in the search box to learn more about \"Bronchitis: Care Instructions. \" Current as of: May 12, 2017 Content Version: 11.4 © 3798-8849 [employer]. Care instructions adapted under license by [employer] (which disclaims liability or warranty for this information). If you have questions about a medical condition or this instruction, always ask your healthcare professional. [address] any warranty or liability for your use of this information. Introducing [facility] & HEALTH SERVICES! Rosanne Reyna introduces Souche patient portal. Now you can access parts of your medical record, email your doctor's office, and request medication refills online. 1. In your internet browser, go to [email or web address] 2. Click on the First Time User? Click Here link in the Sign In box. You will see the New Member Sign Up page. 3. Enter your Souche Access Code exactly as it appears below. You will not need to use this code after youve completed the sign-up process. If you do not sign up before the expiration date, you must request a new code. · Souche Access Code: [email or web address] Expires: 2/12/2018  1:58 PM 
 
4. Enter the last four digits of your Social Security Number (xxxx) and Date of Birth (mm/dd/yyyy) as indicated and click Submit. You will be taken to the next sign-up page. 5. Create a Souche ID. This will be your Souche login ID and cannot be changed, so think of one that is secure and easy to remember. 6. Create a Souche password. You can change your password at any time. 7. Enter your Password Reset Question and Answer. This can be used at a later time if you forget your password. 8. Enter your e-mail address. You will receive e-mail notification when new information is available in [address] 9. Click Sign Up. You can now view and download portions of your medical record. 10. Click the Download Summary menu link to download a portable copy of your medical information. If you have questions, please visit the Frequently Asked Questions section of the [employer] website. Remember, [employer] is NOT to be used for urgent needs. For medical emergencies, dial 911. Now available from your iPhone and Android! Please provide this summary of care documentation to your next provider. Your primary care clinician is listed as Sonal Dempsey. If you have any questions after today's visit, please call [phone].

## 2017-11-14 NOTE — PATIENT INSTRUCTIONS
Bronchitis: Care Instructions  Your Care Instructions    Bronchitis is inflammation of the bronchial tubes, which carry air to the lungs. The tubes swell and produce mucus, or phlegm. The mucus and inflamed bronchial tubes make you cough. You may have trouble breathing. Most cases of bronchitis are caused by viruses like those that cause colds. Antibiotics usually do not help and they may be harmful. Bronchitis usually develops rapidly and lasts about 2 to 3 weeks in otherwise healthy people. Follow-up care is a key part of your treatment and safety. Be sure to make and go to all appointments, and call your doctor if you are having problems. It's also a good idea to know your test results and keep a list of the medicines you take. How can you care for yourself at home? · Take all medicines exactly as prescribed. Call your doctor if you think you are having a problem with your medicine. · Get some extra rest.  · Take an over-the-counter pain medicine, such as acetaminophen (Tylenol), ibuprofen (Advil, Motrin), or naproxen (Aleve) to reduce fever and relieve body aches. Read and follow all instructions on the label. · Do not take two or more pain medicines at the same time unless the doctor told you to. Many pain medicines have acetaminophen, which is Tylenol. Too much acetaminophen (Tylenol) can be harmful. · Take an over-the-counter cough medicine that contains dextromethorphan to help quiet a dry, hacking cough so that you can sleep. Avoid cough medicines that have more than one active ingredient. Read and follow all instructions on the label. · Breathe moist air from a humidifier, hot shower, or sink filled with hot water. The heat and moisture will thin mucus so you can cough it out. · Do not smoke. Smoking can make bronchitis worse. If you need help quitting, talk to your doctor about stop-smoking programs and medicines. These can increase your chances of quitting for good.   When should you call for help? Call 911 anytime you think you may need emergency care. For example, call if:  ? · You have severe trouble breathing. ?Call your doctor now or seek immediate medical care if:  ? · You have new or worse trouble breathing. ? · You cough up dark brown or bloody mucus (sputum). ? · You have a new or higher fever. ? · You have a new rash. ? Watch closely for changes in your health, and be sure to contact your doctor if:  ? · You cough more deeply or more often, especially if you notice more mucus or a change in the color of your mucus. ? · You are not getting better as expected. Where can you learn more? Go to http://jemima-louisa.info/. Enter H333 in the search box to learn more about \"Bronchitis: Care Instructions. \"  Current as of: May 12, 2017  Content Version: 11.4  © 4836-1644 [employer]. Care instructions adapted under license by [employer] (which disclaims liability or warranty for this information). If you have questions about a medical condition or this instruction, always ask your healthcare professional. [address] any warranty or liability for your use of this information.

## 2017-11-14 NOTE — PROGRESS NOTES
Subjective:   Bruno Fontanez is a 80 y.o. female who complains of cough, congestion, green/brown phlegm  for 3 days, gradually worsening since that time. She denies a history of shortness of breath and wheezing. Tried OTC cold remedies with temporary relief. No evaluation to date. Taking 5mg warfarin Mon-Fri, 7mg Sat and Sun. Past Medical History:   Diagnosis Date    Atrial fibrillation ([address]     Hyperlipidemia 6/18/2014    Hypertension     Hypothyroid 3/18/2014    Pulmonary hypertension 2/1/2016 1/16 - PASP=68, EF 55%     S/P lobectomy of lung      Social History   Substance Use Topics    Smoking status: Never Smoker    Smokeless tobacco: Never Used    Alcohol use No     Outpatient Prescriptions Marked as Taking for the 11/14/17 encounter (Office Visit) with Mimi Chase MD   Medication Sig Dispense Refill    doxazosin (CARDURA) 8 mg tablet Take 0.5 tablet twice daily. 90 Tab 3    levothyroxine (SYNTHROID) 50 mcg tablet TAKE 1 TABLET BY MOUTH EVERY MORNING BEFORE BREAKFAST FOR THYROID 90 Tab 3    hydroCHLOROthiazide (HYDRODIURIL) 25 mg tablet Take 1 Tab by mouth daily. 90 Tab 3    flecainide (TAMBOCOR) 100 mg tablet Take 1 Tab by mouth three (3) times daily. 270 Tab 1    warfarin (COUMADIN) 5 mg tablet Take 5 mg by mouth daily. Take one and a half tablets, 7.5 mg on Tuesday and Thursday Saturday, and Sunday Take one tablet, 5mg all other days.  amLODIPine (NORVASC) 5 mg tablet Take 1 Tab by mouth daily. 90 Tab 3     No Known Allergies     Review of Systems  A comprehensive review of systems was negative except for that written in the HPI. Objective:     Visit Vitals    /58    Pulse 86    Temp 97.9 °F (36.6 °C) (Oral)    Resp 17    Ht 5' 3\" (1.6 m)    Wt 148 lb (67.1 kg)    SpO2 97%    BMI 26.22 kg/m2     General:   alert, cooperative and no distress   Eyes: conjunctivae/scleras clear.  PERRL, EOM's intact   Ears: External auditory canals clear, tympanic membranes clear   Sinuses/Nose: No maxillary or frontal tenderness. Purulent rhinorrhea present. Mouth:  No oral lesions, mild pharyngeal erythema, no exudates   Neck: Supple, trachea midline   Heart: S1 and S2 normal,no murmurs noted    Lungs: Scattered crackles and rhonchi, no increased work of breathing   Abdomen: Soft, nontender. Normal bowel sounds   Extremities: No edema or cyanosis              Assessment/Plan:   1. Bronchitis  Doxycycline. Call if not improved    2. Chronic atrial fibrillation (HCC)  Hold warfarin tonight and Thursday night while on azithromycin        Orders Placed This Encounter    azithromycin (ZITHROMAX) 250 mg tablet     Sig: Take 2 tablets today, then take 1 tablet daily     Dispense:  6 Tab     Refill:  0       Verbal and written instructions (see AVS) provided. Patient expresses understanding of diagnosis and treatment plan.

## 2017-11-14 NOTE — PROGRESS NOTES
Chief Complaint   Patient presents with    Cough    Nasal Congestion    Coagulation disorder     PT/INR check      Health Maintenance reviewed

## 2017-11-28 ENCOUNTER — CLINICAL SUPPORT (OUTPATIENT)
Dept: FAMILY MEDICINE CLINIC | Age: 82
End: 2017-11-28

## 2017-11-28 VITALS
RESPIRATION RATE: 12 BRPM | HEIGHT: 63 IN | TEMPERATURE: 98 F | DIASTOLIC BLOOD PRESSURE: 53 MMHG | OXYGEN SATURATION: 98 % | SYSTOLIC BLOOD PRESSURE: 156 MMHG | BODY MASS INDEX: 26.22 KG/M2 | WEIGHT: 148 LBS | HEART RATE: 82 BPM

## 2017-11-28 DIAGNOSIS — I48.20 CHRONIC ATRIAL FIBRILLATION (HCC): Primary | ICD-10-CM

## 2017-11-28 LAB
INR BLD: 2.5
PT POC: 29.8 SECONDS
VALID INTERNAL CONTROL?: YES

## 2017-11-28 NOTE — MR AVS SNAPSHOT
Visit Information Date & Time Provider Department Dept. Phone Encounter #  
 11/28/2017 10:00 AM Nikhil [address] [phone] [id number] Your Appointments 3/30/2018  9:30 AM  
PHYSICAL PRE OP with MD Ivon RolonAlex Spencer [address] ([facility]) Appt Note: 6 month f/u bp $0cp wmc [address], [address] Leonor Sandhoff [place] [address]  
[phone]  
  
   
 [address], [address] [address] Upcoming Health Maintenance Date Due  
 GLAUCOMA SCREENING Q2Y 5/27/2017 MEDICARE YEARLY EXAM 3/24/2018 DTaP/Tdap/Td series (2 - Td) 10/22/2024 Allergies as of 11/28/2017  Review Complete On: 11/28/2017 By: Shaylee Nash No Known Allergies Current Immunizations  Reviewed on 3/23/2017 Name Date Influenza High Dose Vaccine PF 9/29/2017, 9/22/2016, 10/20/2015, 10/22/2014 Influenza Vaccine 12/6/2013 Pneumococcal Conjugate (PCV-13) 3/23/2017 Pneumococcal Polysaccharide (PPSV-23) 12/6/2013 Tdap 10/22/2014 Not reviewed this visit You Were Diagnosed With   
  
 Codes Comments Chronic atrial fibrillation (HCC)    -  Primary ICD-10-CM: X07.8 ICD-9-CM: 427.31 Vitals BP Pulse Temp Resp Height(growth percentile) Weight(growth percentile) 156/53 (BP 1 Location: Left arm, BP Patient Position: Sitting) 82 98 °F (36.7 °C) (Oral) 12 5' 3\" (1.6 m) 148 lb (67.1 kg) SpO2 BMI OB Status Smoking Status 98% 26.22 kg/m2 Postmenopausal Never Smoker BMI and BSA Data Body Mass Index Body Surface Area  
 26.22 kg/m 2 1.73 m 2 Preferred Pharmacy Pharmacy Name Phone [facility], [address] [phone] Your Updated Medication List  
  
   
This list is accurate as of: 11/28/17 10:11 AM.  Always use your most recent med list. amLODIPine 5 mg tablet Commonly known as:  Herson Roland Take 1 Tab by mouth daily. COUMADIN 5 mg tablet Generic drug:  warfarin Take 5 mg by mouth daily. Take one and a half tablets, 7.5 mg on Tuesday and Thursday Saturday, and Sunday Take one tablet, 5mg all other days. doxazosin 8 mg tablet Commonly known as:  CARDURA Take 0.5 tablet twice daily. flecainide 100 mg tablet Commonly known as:  TAMBOCOR Take 1 Tab by mouth three (3) times daily. hydroCHLOROthiazide 25 mg tablet Commonly known as:  HYDRODIURIL Take 1 Tab by mouth daily. levothyroxine 50 mcg tablet Commonly known as:  SYNTHROID  
TAKE 1 TABLET BY MOUTH EVERY MORNING BEFORE BREAKFAST FOR THYROID November 2017 Details Sun Mon Tue Wed Thu Fri Sat  
     1  
  
  
  
   2  
  
  
  
   3  
  
  
  
   4  
  
  
  
  
  5  
  
  
  
   6  
  
  
  
   7  
  
  
  
   8  
  
  
  
   9  
  
  
  
   10  
  
  
  
   11  
  
  
  
  
  12  
  
  
  
   13  
  
  
  
   14  
  
  
  
   15  
  
  
  
   16  
  
  
  
   17  
  
  
  
   18  
  
  
  
  
  19  
  
  
  
   20  
  
  
  
   21  
  
  
  
   22  
  
  
  
   23  
  
  
  
   24  
  
  
  
   25  
  
  
  
  
  26  
  
  
  
   27  
  
  
  
   28  
  
5 mg See details 29  
  
5 mg  
  
   30  
  
5 mg Date Details 11/28 This INR check INR: 2.5 How to take your warfarin dose To take:  5 mg Take one of the 5 mg tablets. December 2017 Details Kaylin Chi Tue Wed Thu Fri Sat  
       1  
  
5 mg  
  
   2  
  
7.5 mg  
  
  
  3  
  
7.5 mg  
  
   4  
  
5 mg  
  
   5  
  
5 mg  
  
   6  
  
5 mg  
  
   7  
  
5 mg  
  
   8  
  
5 mg  
  
   9  
  
7.5 mg  
  
  
  10  
  
7.5 mg  
  
   11  
  
5 mg  
  
   12  
  
5 mg  
  
   13  
  
5 mg  
  
   14  
  
5 mg  
  
   15  
  
5 mg  
  
   16  
  
7.5 mg  
  
  
  17  
  
7.5 mg  
  
   18  
  
5 mg 19  
  
5 mg  
  
   20  
  
5 mg  
  
   21  
  
5 mg  
  
   22  
  
5 mg 23  
  
7.5 mg  
  
  
  24  
  
7.5 mg  
  
   25  
  
5 mg  
  
   26  
  
5 mg  
  
   27  
  
5 mg  
  
   28  
  
5 mg  
  
   29  
  
5 mg  
  
   30  
  
7.5 mg  
  
  
  31  
  
7.5 mg Date Details No additional details How to take your warfarin dose To take:  5 mg Take one of the 5 mg tablets. To take:  7.5 mg Take one and a half of the 5 mg tablets. January 2018 Details Sun Mon Tue Wed Thu Fri Sat  
   1  
  
5 mg  
  
   2  
  
  
  
   3  
  
  
  
   4  
  
  
  
   5  
  
  
  
   6  
  
  
  
  
  7  
  
  
  
   8  
  
  
  
   9  
  
  
  
   10  
  
  
  
   11  
  
  
  
   12  
  
  
  
   13  
  
  
  
  
  14  
  
  
  
   15  
  
  
  
   16  
  
  
  
   17  
  
  
  
   18  
  
  
  
   19  
  
  
  
   20  
  
  
  
  
  21  
  
  
  
   22  
  
  
  
   23  
  
  
  
   24  
  
  
  
   25  
  
  
  
   26  
  
  
  
   27  
  
  
  
  
  28  
  
  
  
   29  
  
  
  
   30  
  
  
  
   31  
  
  
  
     
 Date Details No additional details Date of next INR:  1/1/2018 How to take your warfarin dose To take:  5 mg Take one of the 5 mg tablets. We Performed the Following AMB POC PT/INR [07752 CPT(R)] Introducing [facility] & [facility] SERVICES! Steph Rincon introduces [employer] patient portal. Now you can access parts of your medical record, email your doctor's office, and request medication refills online. 1. In your internet browser, go to [email or web address] 2. Click on the First Time User? Click Here link in the Sign In box. You will see the New Member Sign Up page. 3. Enter your Mak[employer] Access Code exactly as it appears below. You will not need to use this code after youve completed the sign-up process. If you do not sign up before the expiration date, you must request a new code. · Mak[employer] Access Code: [email or web address] Expires: 2/12/2018  1:58 PM 
 
 4. Enter the last four digits of your Social Security Number (xxxx) and Date of Birth (mm/dd/yyyy) as indicated and click Submit. You will be taken to the next sign-up page. 5. Create a AkaRx ID. This will be your AkaRx login ID and cannot be changed, so think of one that is secure and easy to remember. 6. Create a AkaRx password. You can change your password at any time. 7. Enter your Password Reset Question and Answer. This can be used at a later time if you forget your password. 8. Enter your e-mail address. You will receive e-mail notification when new information is available in [address] 9. Click Sign Up. You can now view and download portions of your medical record. 10. Click the Download Summary menu link to download a portable copy of your medical information. If you have questions, please visit the Frequently Asked Questions section of the [employer] website. Remember, [employer] is NOT to be used for urgent needs. For medical emergencies, dial 911. Now available from your iPhone and Android! Please provide this summary of care documentation to your next provider. Your primary care clinician is listed as Sara Flores. If you have any questions after today's visit, please call [phone].

## 2017-11-28 NOTE — PROGRESS NOTES
Chief Complaint   Patient presents with    Anticoagulation     Patient is here for recheck of her INR. Patient is currently taking coumadin taking 5mg Mon-Fri, 7mg Sat and Sun. Visit Vitals    /53 (BP 1 Location: Left arm, BP Patient Position: Sitting)    Pulse 82    Temp 98 °F (36.7 °C) (Oral)    Resp 12    Ht 5' 3\" (1.6 m)    Wt 148 lb (67.1 kg)    SpO2 98%    BMI 26.22 kg/m2     Results for orders placed or performed in visit on 11/28/17   AMB POC PT/INR   Result Value Ref Range    VALID INTERNAL CONTROL POC Yes     Prothrombin time (POC) 29.8 seconds    INR POC 2.5      Patient advised to continue taking current dose of coumadin and return for recheck of INR. Patient given coumadin calendar.

## 2017-12-18 RX ORDER — FLECAINIDE ACETATE 100 MG/1
100 TABLET ORAL 3 TIMES DAILY
Qty: 270 TAB | Refills: 1 | Status: SHIPPED | OUTPATIENT
Start: 2017-12-18 | End: 2019-12-23 | Stop reason: SDUPTHER

## 2017-12-26 ENCOUNTER — CLINICAL SUPPORT (OUTPATIENT)
Dept: FAMILY MEDICINE CLINIC | Age: 82
End: 2017-12-26

## 2017-12-26 VITALS
DIASTOLIC BLOOD PRESSURE: 54 MMHG | WEIGHT: 149 LBS | TEMPERATURE: 98.1 F | HEIGHT: 63 IN | HEART RATE: 80 BPM | RESPIRATION RATE: 16 BRPM | BODY MASS INDEX: 26.4 KG/M2 | OXYGEN SATURATION: 98 % | SYSTOLIC BLOOD PRESSURE: 164 MMHG

## 2017-12-26 DIAGNOSIS — I48.20 CHRONIC ATRIAL FIBRILLATION (HCC): Primary | ICD-10-CM

## 2017-12-26 LAB
INR BLD: 2.4
PT POC: 28.9 SECONDS
VALID INTERNAL CONTROL?: YES

## 2017-12-26 NOTE — MR AVS SNAPSHOT
Visit Information Date & Time Provider Department Dept. Phone Encounter #  
 12/26/2017 10:00 AM Nikhil [address] [phone] [id number] Your Appointments 3/30/2018  9:30 AM  
PHYSICAL PRE OP with MD Melody Shen Tj [address] ([address]) Appt Note: 6 month f/u bp $0cp wmc [address], [address] Elma Ortiz [address] [facility] [address]  
[phone]  
  
   
 [address], [place] [address] [address] Upcoming Health Maintenance Date Due  
 GLAUCOMA SCREENING Q2Y 5/27/2017 MEDICARE YEARLY EXAM 3/24/2018 DTaP/Tdap/Td series (2 - Td) 10/22/2024 Allergies as of 12/26/2017  Review Complete On: 12/26/2017 By: Luigi Coleman No Known Allergies Current Immunizations  Reviewed on 3/23/2017 Name Date Influenza High Dose Vaccine PF 9/29/2017, 9/22/2016, 10/20/2015, 10/22/2014 Influenza Vaccine 12/6/2013 Pneumococcal Conjugate (PCV-13) 3/23/2017 Pneumococcal Polysaccharide (PPSV-23) 12/6/2013 Tdap 10/22/2014 Not reviewed this visit You Were Diagnosed With   
  
 Codes Comments Chronic atrial fibrillation (HCC)    -  Primary ICD-10-CM: L15.6 ICD-9-CM: 427.31 Vitals BP Pulse Temp Resp Height(growth percentile) Weight(growth percentile) 164/54 (BP 1 Location: Left arm, BP Patient Position: Sitting) 80 98.1 °F (36.7 °C) (Oral) 16 5' 3\" (1.6 m) 149 lb (67.6 kg) SpO2 BMI OB Status Smoking Status 98% 26.39 kg/m2 Postmenopausal Never Smoker BMI and BSA Data Body Mass Index Body Surface Area  
 26.39 kg/m 2 1.73 m 2 Preferred Pharmacy Pharmacy Name Phone [facility], [address] [phone] Your Updated Medication List  
  
   
This list is accurate as of: 12/26/17 10:03 AM.  Always use your most recent med list. amLODIPine 5 mg tablet Commonly known as:  Nahum Leggett Take 1 Tab by mouth daily. COUMADIN 5 mg tablet Generic drug:  warfarin Take 5 mg by mouth daily. Patient is currently taking coumadin taking 5mg Mon-Fri, 7mg Sat and Sun.  
  
 doxazosin 8 mg tablet Commonly known as:  CARDURA Take 0.5 tablet twice daily. flecainide 100 mg tablet Commonly known as:  TAMBOCOR Take 1 Tab by mouth three (3) times daily. hydroCHLOROthiazide 25 mg tablet Commonly known as:  HYDRODIURIL Take 1 Tab by mouth daily. levothyroxine 50 mcg tablet Commonly known as:  SYNTHROID  
TAKE 1 TABLET BY MOUTH EVERY MORNING BEFORE BREAKFAST FOR THYROID December 2017 Details Sun Mon Tue Wed Thu Fri Sat  
       1  
  
  
  
   2  
  
  
  
  
  3  
  
  
  
   4  
  
  
  
   5  
  
  
  
   6  
  
  
  
   7  
  
  
  
   8  
  
  
  
   9  
  
  
  
  
  10  
  
  
  
   11  
  
  
  
   12  
  
  
  
   13  
  
  
  
   14  
  
  
  
   15  
  
  
  
   16  
  
  
  
  
  17  
  
  
  
   18  
  
  
  
   19  
  
  
  
   20  
  
  
  
   21  
  
  
  
   22  
  
  
  
   23  
  
  
  
  
  24  
  
  
  
   25  
  
  
  
   26  
  
5 mg See details 27  
  
5 mg  
  
   28  
  
5 mg  
  
   29  
  
5 mg  
  
   30  
  
7.5 mg  
  
  
  31  
  
7.5 mg Date Details 12/26 This INR check INR: 2.4 How to take your warfarin dose To take:  5 mg Take one of the 5 mg tablets. To take:  7.5 mg Take one and a half of the 5 mg tablets. January 2018 Details Prince Stark Tue Wed Thu Fri Sat  
   1  
  
5 mg 2  
  
5 mg  
  
   3  
  
5 mg  
  
   4  
  
5 mg  
  
   5  
  
5 mg  
  
   6  
  
7.5 mg  
  
  
  7  
  
7.5 mg  
  
   8  
  
5 mg  
  
   9  
  
5 mg  
  
   10  
  
5 mg  
  
   11  
  
5 mg  
  
   12  
  
5 mg  
  
   13  
  
7.5 mg  
  
  
  14  
  
7.5 mg  
  
   15  
  
5 mg  
  
   16  
  
5 mg  
  
   17  
  
5 mg  
  
   18  
  
5 mg 19  
  
5 mg 21 7.5 mg  
  
  
  21  
  
7.5 mg  
  
   22  
  
5 mg  
  
   23  
  
5 mg  
  
   24  
  
  
  
   25  
  
  
  
   26  
  
  
  
   27  
  
  
  
  
  28  
  
  
  
   29  
  
  
  
   30  
  
  
  
   31  
  
  
  
     
 Date Details No additional details Date of next INR:  1/23/2018 How to take your warfarin dose To take:  5 mg Take one of the 5 mg tablets. To take:  7.5 mg Take one and a half of the 5 mg tablets. We Performed the Following AMB POC PT/INR [60285 CPT(R)] Introducing [facility] & HEALTH SERVICES! Shiv Rochascott introduces [employer] patient portal. Now you can access parts of your medical record, email your doctor's office, and request medication refills online. 1. In your internet browser, go to [email or web address]. [email or web address] 2. Click on the First Time User? Click Here link in the Sign In box. You will see the New Member Sign Up page. 3. Enter your [employer] Access Code exactly as it appears below. You will not need to use this code after youve completed the sign-up process. If you do not sign up before the expiration date, you must request a new code. · [employer] Access Code: [email or web address] Expires: 2/12/2018  1:58 PM 
 
4. Enter the last four digits of your Social Security Number (xxxx) and Date of Birth (mm/dd/yyyy) as indicated and click Submit. You will be taken to the next sign-up page. 5. Create a [employer] ID. This will be your [employer] login ID and cannot be changed, so think of one that is secure and easy to remember. 6. Create a [employer] password. You can change your password at any time. 7. Enter your Password Reset Question and Answer. This can be used at a later time if you forget your password. 8. Enter your e-mail address. You will receive e-mail notification when new information is available in [address] 9. Click Sign Up. You can now view and download portions of your medical record. 10. Click the Download Summary menu link to download a portable copy of your medical information. If you have questions, please visit the Frequently Asked Questions section of the [employer] website. Remember, [employer] is NOT to be used for urgent needs. For medical emergencies, dial 911. Now available from your iPhone and Android! Please provide this summary of care documentation to your next provider. Your primary care clinician is listed as Pranay Templeton. If you have any questions after today's visit, please call [phone].

## 2017-12-26 NOTE — PROGRESS NOTES
Chief Complaint   Patient presents with    Anticoagulation   Patient is currently taking coumadin taking 5mg Mon-Fri, 7.5 mg Sat and Sun. Patient is here for recheck of her INR. Visit Vitals    /54 (BP 1 Location: Left arm, BP Patient Position: Sitting)    Pulse 80    Temp 98.1 °F (36.7 °C) (Oral)    Resp 16    Ht 5' 3\" (1.6 m)    Wt 149 lb (67.6 kg)    SpO2 98%    BMI 26.39 kg/m2     Results for orders placed or performed in visit on 12/26/17   AMB POC PT/INR   Result Value Ref Range    VALID INTERNAL CONTROL POC Yes     Prothrombin time (POC) 28.9 seconds    INR POC 2.4      Patient advised to continue taking current dose of coumadin and return in one month for recheck . Patient given coumadin calendar.

## 2017-12-29 RX ORDER — AMLODIPINE BESYLATE 5 MG/1
5 TABLET ORAL DAILY
Qty: 90 TAB | Refills: 3 | Status: SHIPPED | OUTPATIENT
Start: 2017-12-29 | End: 2018-12-28 | Stop reason: SDUPTHER

## 2018-01-23 ENCOUNTER — CLINICAL SUPPORT (OUTPATIENT)
Dept: FAMILY MEDICINE CLINIC | Age: 83
End: 2018-01-23

## 2018-01-23 VITALS
HEIGHT: 63 IN | RESPIRATION RATE: 12 BRPM | DIASTOLIC BLOOD PRESSURE: 56 MMHG | OXYGEN SATURATION: 96 % | TEMPERATURE: 97.9 F | WEIGHT: 146 LBS | BODY MASS INDEX: 25.87 KG/M2 | SYSTOLIC BLOOD PRESSURE: 154 MMHG | HEART RATE: 80 BPM

## 2018-01-23 DIAGNOSIS — I48.20 CHRONIC ATRIAL FIBRILLATION (HCC): Primary | ICD-10-CM

## 2018-01-23 LAB
INR BLD: 2
PT POC: 24 SECONDS
VALID INTERNAL CONTROL?: YES

## 2018-01-23 NOTE — MR AVS SNAPSHOT
[address] [facility] [address] 
 
 
 [address], [place] [address] [place] [facility] 
[phone] Patient: Nabila Goel MRN:  [id number] Visit Information Date & Time Provider Department Dept. Phone Encounter #  
 1/23/2018 10:00 AM [address]Dar[address]at [address] [phone] [id number] Your Appointments 1/23/2018 10:00 AM  
Nurse Visit with Jani Spencer [address] ([facility] Appt Note: check blood [address], [address] [address]  
[address], [place] [address] [address]  
  
    
 3/30/2018  9:30 AM  
PHYSICAL PRE OP with MD Melody Juárez [address] ([facility] Appt Note: 6 month f/u bp $0cp wmc [address], [address] [facility] [address]  
[phone]  
  
   
 [address], [place] [address] [address] Upcoming Health Maintenance Date Due  
 GLAUCOMA SCREENING Q2Y 5/27/2017 MEDICARE YEARLY EXAM 3/24/2018 DTaP/Tdap/Td series (2 - Td) 10/22/2024 Allergies as of 1/23/2018  Review Complete On: 12/26/2017 By: Ramonita Castañeda No Known Allergies Current Immunizations  Reviewed on 3/23/2017 Name Date Influenza High Dose Vaccine PF 9/29/2017, 9/22/2016, 10/20/2015, 10/22/2014 Influenza Vaccine 12/6/2013 Pneumococcal Conjugate (PCV-13) 3/23/2017 Pneumococcal Polysaccharide (PPSV-23) 12/6/2013 Tdap 10/22/2014 Not reviewed this visit You Were Diagnosed With   
  
 Codes Comments Chronic atrial fibrillation (HCC)    -  Primary ICD-10-CM: R13.5 ICD-9-CM: 427.31 Vitals BP Pulse Temp Resp Height(growth percentile) Weight(growth percentile) 154/56 (BP 1 Location: Left arm, BP Patient Position: Sitting) 80 97.9 °F (36.6 °C) (Oral) 12 5' 3\" (1.6 m) 146 lb (66.2 kg) SpO2 BMI OB Status Smoking Status 96% 25.86 kg/m2 Postmenopausal Never Smoker Vitals History BMI and BSA Data Body Mass Index Body Surface Area  
 25.86 kg/m 2 1.72 m 2 Preferred Pharmacy Pharmacy Name Phone [facility], [address] [phone] Your Updated Medication List  
  
   
This list is accurate as of: 1/23/18  9:56 AM.  Always use your most recent med list. amLODIPine 5 mg tablet Commonly known as:  Madi Free Take 1 Tab by mouth daily. COUMADIN 5 mg tablet Generic drug:  warfarin Take 5 mg by mouth daily. Patient is currently taking coumadin taking 5mg Mon-Fri, 7. 5 mg Sat and Sun.  
  
 doxazosin 8 mg tablet Commonly known as:  CARDURA Take 0.5 tablet twice daily. flecainide 100 mg tablet Commonly known as:  TAMBOCOR Take 1 Tab by mouth three (3) times daily. hydroCHLOROthiazide 25 mg tablet Commonly known as:  HYDRODIURIL Take 1 Tab by mouth daily. levothyroxine 50 mcg tablet Commonly known as:  SYNTHROID  
TAKE 1 TABLET BY MOUTH EVERY MORNING BEFORE BREAKFAST FOR THYROID  
  
  
January 2018 Details Sun Mon Tue Wed Thu Fri Sat  
   1  
  
  
  
   2  
  
  
  
   3  
  
  
  
   4  
  
  
  
   5  
  
  
  
   6  
  
  
  
  
  7  
  
  
  
   8  
  
  
  
   9  
  
  
  
   10  
  
  
  
   11  
  
  
  
   12  
  
  
  
   13  
  
  
  
  
  14  
  
  
  
   15  
  
  
  
   16  
  
  
  
   17  
  
  
  
   18  
  
  
  
   19  
  
  
  
   20  
  
  
  
  
  21  
  
  
  
   22  
  
  
  
   23  
  
5 mg See details 24  
  
5 mg  
  
   25  
  
5 mg  
  
   26  
  
5 mg  
  
   27  
  
7.5 mg  
  
  
  28  
  
7.5 mg  
  
   29  
  
5 mg  
  
   30  
  
5 mg  
  
   31  
  
5 mg Date Details 01/23 This INR check INR: 2.0 Date of next INR: No date specified How to take your warfarin dose To take:  5 mg Take one of the 5 mg tablets. To take:  7.5 mg Take one and a half of the 5 mg tablets. We Performed the Following AMB POC PT/INR [28385 CPT(R)] Introducing [facility] & HEALTH SERVICES! [facility] introduces [employer] patient portal. Now you can access parts of your medical record, email your doctor's office, and request medication refills online. 1. In your internet browser, go to [email or web address]://[employer] ([employer] ([employer] 2. Click on the First Time User? Click Here link in the Sign In box. You will see the New Member Sign Up page. 3. Enter your [employer] Access Code exactly as it appears below. You will not need to use this code after youve completed the sign-up process. If you do not sign up before the expiration date, you must request a new code. · [employer] Access Code: [email or web address] Expires: 2/12/2018  1:58 PM 
 
4. Enter the last four digits of your Social Security Number (xxxx) and Date of Birth (mm/dd/yyyy) as indicated and click Submit. You will be taken to the next sign-up page. 5. Create a [employer] ID. This will be your [employer] login ID and cannot be changed, so think of one that is secure and easy to remember. 6. Create a [employer] password. You can change your password at any time. 7. Enter your Password Reset Question and Answer. This can be used at a later time if you forget your password. 8. Enter your e-mail address. You will receive e-mail notification when new information is available in [address] 9. Click Sign Up. You can now view and download portions of your medical record. 10. Click the Download Summary menu link to download a portable copy of your medical information. If you have questions, please visit the Frequently Asked Questions section of the [employer] website. Remember, [employer] is NOT to be used for urgent needs. For medical emergencies, dial 911. Now available from your iPhone and Android! Please provide this summary of care documentation to your next provider. Your primary care clinician is listed as Nelly Felty. If you have any questions after today's visit, please call [phone].

## 2018-01-23 NOTE — PROGRESS NOTES
Chief Complaint   Patient presents with    Anticoagulation     Patient is here for recheck of her INR. Patient is taking coumadin 5mg Mon-Fri, 7.5 mg Sat and Sun. Visit Vitals    /56 (BP 1 Location: Left arm, BP Patient Position: Sitting)    Pulse 80    Temp 97.9 °F (36.6 °C) (Oral)    Resp 12    Ht 5' 3\" (1.6 m)    Wt 146 lb (66.2 kg)    SpO2 96%    BMI 25.86 kg/m2     Results for orders placed or performed in visit on 01/23/18   AMB POC PT/INR   Result Value Ref Range    VALID INTERNAL CONTROL POC Yes     Prothrombin time (POC) 24.0 seconds    INR POC 2.0      Patient advised to continue taking current dose of coumadin and return in one month for recheck.

## 2018-02-13 ENCOUNTER — APPOINTMENT (OUTPATIENT)
Dept: GENERAL RADIOLOGY | Age: 83
End: 2018-02-13
Attending: PHYSICIAN ASSISTANT
Payer: MEDICARE

## 2018-02-13 ENCOUNTER — HOSPITAL ENCOUNTER (EMERGENCY)
Age: 83
Discharge: HOME | End: 2018-02-13
Attending: EMERGENCY MEDICINE
Payer: MEDICARE

## 2018-02-13 VITALS
SYSTOLIC BLOOD PRESSURE: 188 MMHG | HEIGHT: 63 IN | BODY MASS INDEX: 26.48 KG/M2 | OXYGEN SATURATION: 100 % | HEART RATE: 80 BPM | RESPIRATION RATE: 16 BRPM | TEMPERATURE: 98.1 F | DIASTOLIC BLOOD PRESSURE: 52 MMHG | WEIGHT: 149.47 LBS

## 2018-02-13 DIAGNOSIS — I10 ESSENTIAL HYPERTENSION: ICD-10-CM

## 2018-02-13 DIAGNOSIS — S52.501A CLOSED FRACTURE OF DISTAL END OF RIGHT RADIUS, UNSPECIFIED FRACTURE MORPHOLOGY, INITIAL ENCOUNTER: Primary | ICD-10-CM

## 2018-02-13 PROBLEM — S62.101A RIGHT WRIST FRACTURE: Status: ACTIVE | Noted: 2018-02-13

## 2018-02-13 PROCEDURE — 75810000303 HC CLSD TRMT  FRACTURE/DISLOCATION W/  ANES

## 2018-02-13 PROCEDURE — 99283 EMERGENCY DEPT VISIT LOW MDM: CPT

## 2018-02-13 PROCEDURE — 74011250637 HC RX REV CODE- 250/637: Performed by: EMERGENCY MEDICINE

## 2018-02-13 PROCEDURE — 73110 X-RAY EXAM OF WRIST: CPT

## 2018-02-13 PROCEDURE — 73100 X-RAY EXAM OF WRIST: CPT

## 2018-02-13 RX ORDER — OXYCODONE HYDROCHLORIDE 5 MG/1
5 TABLET ORAL
Status: COMPLETED | OUTPATIENT
Start: 2018-02-13 | End: 2018-02-13

## 2018-02-13 RX ORDER — LIDOCAINE HYDROCHLORIDE 10 MG/ML
10 INJECTION, SOLUTION EPIDURAL; INFILTRATION; INTRACAUDAL; PERINEURAL ONCE
Status: DISCONTINUED | OUTPATIENT
Start: 2018-02-13 | End: 2018-02-14 | Stop reason: HOSPADM

## 2018-02-13 RX ORDER — HYDROCODONE BITARTRATE AND ACETAMINOPHEN 5; 325 MG/1; MG/1
1 TABLET ORAL
Qty: 12 TAB | Refills: 0 | Status: SHIPPED | OUTPATIENT
Start: 2018-02-13 | End: 2018-02-20

## 2018-02-13 RX ORDER — HYDROCODONE BITARTRATE AND ACETAMINOPHEN 5; 325 MG/1; MG/1
1 TABLET ORAL
Status: DISCONTINUED | OUTPATIENT
Start: 2018-02-13 | End: 2018-02-13

## 2018-02-13 RX ADMIN — OXYCODONE HYDROCHLORIDE 5 MG: 5 TABLET ORAL at 20:55

## 2018-02-14 NOTE — ED PROVIDER NOTES
EMERGENCY DEPARTMENT HISTORY AND PHYSICAL EXAM      Date: 2/13/2018  Patient Name: Domenic Fang    History of Presenting Illness     Chief Complaint   Patient presents with    Wrist Pain     Pt ambulatory to triage with c/o R arm pain after falling. Pt also c/o R shoulder pain. Pt unable to move fingers. History Provided By: Patient    HPI: Domenic Fagn, 80 y.o. female with PMHx significant for HTN, a-fib, pulmonary HTN, HLD, hypothyroidism presents ambulatory to the ED with cc of sudden onset R wrist pain s/p mechanical fall x 1830 today. Pt reports that she was standing on a cinder block to get dog food off of a shelf when she missteped, causing her to fall backwards onto her outstretched RUE, causing the onset of her pain. She did not hit her head or have LOC. She did take 1g of tylenol at 1845. She specifically denies shoulder pain. PCP: Aleksandr Stone MD    There are no other complaints, changes, or physical findings at this time. Current Facility-Administered Medications   Medication Dose Route Frequency Provider Last Rate Last Dose    lidocaine (PF) (XYLOCAINE) 10 mg/mL (1 %) injection 10 mL  10 mL SubCUTAneous ONCE Guerita Alvarado MD         Current Outpatient Prescriptions   Medication Sig Dispense Refill    HYDROcodone-acetaminophen (NORCO) 5-325 mg per tablet Take 1 Tab by mouth every six (6) hours as needed for Pain. Max Daily Amount: 4 Tabs. 12 Tab 0    flecainide (TAMBOCOR) 100 mg tablet Take 1 Tab by mouth three (3) times daily. 270 Tab 1    levothyroxine (SYNTHROID) 50 mcg tablet TAKE 1 TABLET BY MOUTH EVERY MORNING BEFORE BREAKFAST FOR THYROID 90 Tab 3    hydroCHLOROthiazide (HYDRODIURIL) 25 mg tablet Take 1 Tab by mouth daily. 90 Tab 3    warfarin (COUMADIN) 5 mg tablet Take 5 mg by mouth daily. Patient is currently taking coumadin taking 5mg Mon-Fri, 7. 5 mg Sat and Sun. Indications: S, S   7.5mg.      amLODIPine (NORVASC) 5 mg tablet Take 1 Tab by mouth daily.  [address] Tab 3    doxazosin (CARDURA) 8 mg tablet Take 0.5 tablet twice daily. (Patient taking differently: daily. Take 0.5 tablet twice daily.) 90 Tab 3       Past History     Past Medical History:  Past Medical History:   Diagnosis Date    Atrial fibrillation ([address]     Hyperlipidemia 6/18/2014    Hypertension     Hypothyroid 3/18/2014    Pulmonary hypertension 2/1/2016 1/16 - PASP=68, EF 55%     S/P lobectomy of lung        Past Surgical History:  Past Surgical History:   Procedure Laterality Date    CHEST SURGERY PROCEDURE UNLISTED  1951    lower lobe right lung    HX APPENDECTOMY  2008       Family History:  Family History   Problem Relation Age of Onset    Cancer Mother        Social History:  Social History   Substance Use Topics    Smoking status: Never Smoker    Smokeless tobacco: Never Used    Alcohol use No       Allergies:  No Known Allergies      Review of Systems   Review of Systems   Constitutional: Negative for chills and fever. Respiratory: Negative for cough and shortness of breath. Cardiovascular: Negative for chest pain. Gastrointestinal: Negative for constipation, diarrhea, nausea and vomiting. Musculoskeletal: Positive for arthralgias (R wrist). No shoulder pain    Neurological: Negative for syncope, weakness, numbness and headaches. All other systems reviewed and are negative. Physical Exam   Physical Exam   Constitutional: She is oriented to person, place, and time. She appears well-developed and well-nourished. HENT:   Head: Normocephalic and atraumatic. Eyes: Conjunctivae and EOM are normal.   Neck: Normal range of motion. Neck supple. Cardiovascular: Normal rate and regular rhythm. Pulses:       Radial pulses are 2+ on the right side, and 2+ on the left side. Pulmonary/Chest: Effort normal and breath sounds normal. No respiratory distress. Abdominal: Soft. She exhibits no distension. There is no tenderness.    Musculoskeletal: Normal range of motion. obvious deformity to R wrist, can wiggle fingers, good cap refill   Neurological: She is alert and oriented to person, place, and time. Skin: Skin is warm and dry. Psychiatric: She has a normal mood and affect. Nursing note and vitals reviewed. Diagnostic Study Results     Radiologic Studies -   EXAM: XR WRIST RT AP/LAT/OBL MIN 3V     INDICATION:  R wrist pain. Fall today     COMPARISON: None.     FINDINGS: Three  views of the right wrist demonstrate a comminuted impacted  intra-articular fracture of the distal radius. There is volar angulation at the  fracture line with resulting dorsal angulation of the radiocarpal joint. There  is a fracture at the base of the ulnar styloid. Subchondral degenerative changes  are noted in the capitate. There is degeneration of the triscaphe the joint. There is soft tissue swelling     IMPRESSION  IMPRESSION:    1. Comminuted intra-articular fracture distal radius  2. Fracture base of ulnar styloid age-indeterminate.         Medical Decision Making   I am the first provider for this patient. I reviewed the vital signs, available nursing notes, past medical history, past surgical history, family history and social history. Vital Signs-Reviewed the patient's vital signs. Patient Vitals for the past 12 hrs:   Temp Pulse Resp BP SpO2   02/13/18 2019 98.1 °F (36.7 °C) 80 16 188/52 100 %       Records Reviewed: Nursing Notes and Old Medical Records    Provider Notes (Medical Decision Making):   Patient presents with R wrist pain after trauma. DDx: dislocation, fracture, contusion. Will get analgesics and xrays. Neurovasularly intact    I have reviewed the patients xray results with them and have completed the first phase of their fracture care. I have also conveyed that they will be following up with an orthopedist who will continue with the next phase of their care.  I have explained how very important it is for the patient to follow-up with this next phase as it may include further casting and/or surgery. ED Course:   Initial assessment performed. The patients presenting problems have been discussed, and they are in agreement with the care plan formulated and outlined with them. I have encouraged them to ask questions as they arise throughout their visit. CONSULT NOTE:   8:52 PM  Roxana Gates M.D. spoke with JOSH Russell,   Specialty: Ortho  Discussed pt's hx, disposition, and available diagnostic and imaging results. Reviewed care plans. Consultant will come reduce pt's wrist. He states that after the reduction, we should get XRs and have the pt f/u in his office at 1400 tomorrow. Written by Doug Lu ED Scribe, as dictated by Roxana Gates M.D.    10:19 PM  Pt is agreeable to follow up with Dr. Gayle Tomlinson in office tomorrow. Written by Doug Flax ED Scribe as dictated by Roxana Gates M.D. Disposition:  DISCHARGE NOTE:  10:19 PM  Pt has been reexamined. Pt has no new complaints, changes, or physical findings. Care plan outlined and precautions discussed. All available results reviewed with pt. All medications reviewed with pt. All of pts questions and concerns addressed. Pt agrees to f/u as instructed and agrees to return to ED upon further deterioration. Pt is ready to go home. Written by [facility] ED Scribe as dictated by Roxana Gates M.D. PLAN:  1. Current Discharge Medication List      START taking these medications    Details   HYDROcodone-acetaminophen (NORCO) 5-325 mg per tablet Take 1 Tab by mouth every six (6) hours as needed for Pain. Max Daily Amount: 4 Tabs. Qty: 12 Tab, Refills: 0    Associated Diagnoses: Closed fracture of distal end of right radius, unspecified fracture morphology, initial encounter; Essential hypertension           2.    Follow-up Information     Follow up With Details Comments Contact Molly Isaac MD In 1 day 2pm [address] [place] [address]  [phone]          Return to ED if worse     Diagnosis     Clinical Impression:   1. Closed fracture of distal end of right radius, unspecified fracture morphology, initial encounter    2. Essential hypertension        Attestations: This note is prepared by Chu Corraels acting as scribe for Christal Mclaughlin M.D. Christal Mclaughlin M.D. : The scribe's documentation has been prepared under my direction and personally reviewed by me in its entirety. I confirm that the note above accurately reflects all work, treatment, procedures, and medical decision making performed by me.

## 2018-02-14 NOTE — DISCHARGE INSTRUCTIONS
Broken Arm: Care Instructions  Your Care Instructions  Fractures can range from a small, hairline crack, to a bone or bones broken into two or more pieces. Your treatment depends on how bad the break is. Your doctor may have put your arm in a splint or cast to allow it to heal or to keep it stable until you see another doctor. It may take weeks or months for your arm to heal. You can help your arm heal with some care at home. You heal best when you take good care of yourself. Eat a variety of healthy foods, and don't smoke. You may have had a sedative to help you relax. You may be unsteady after having sedation. It can take a few hours for the medicine's effects to wear off. Common side effects of sedation include nausea, vomiting, and feeling sleepy or tired. The doctor has checked you carefully, but problems can develop later. If you notice any problems or new symptoms, get medical treatment right away. Follow-up care is a key part of your treatment and safety. Be sure to make and go to all appointments, and call your doctor if you are having problems. It's also a good idea to know your test results and keep a list of the medicines you take. How can you care for yourself at home? · If the doctor gave you a sedative:  ¨ For 24 hours, don't do anything that requires attention to detail. It takes time for the medicine's effects to completely wear off. ¨ For your safety, do not drive or operate any machinery that could be dangerous. Wait until the medicine wears off and you can think clearly and react easily. · Put ice or a cold pack on your arm for 10 to 20 minutes at a time. Try to do this every 1 to 2 hours for the next 3 days (when you are awake). Put a thin cloth between the ice and your cast or splint. Keep the cast or splint dry. · Follow the cast care instructions your doctor gives you. If you have a splint, do not take it off unless your doctor tells you to. · Be safe with medicines.  Take pain medicines exactly as directed. ¨ If the doctor gave you a prescription medicine for pain, take it as prescribed. ¨ If you are not taking a prescription pain medicine, ask your doctor if you can take an over-the-counter medicine. · Prop up your arm on pillows when you sit or lie down in the first few days after the injury. Keep the arm higher than the level of your heart. This will help reduce swelling. · Follow instructions for exercises to keep your arm strong. · Wiggle your fingers and wrist often to reduce swelling and stiffness. When should you call for help? Call 911 anytime you think you may need emergency care. For example, call if:  ? · You are very sleepy and you have trouble waking up. ?Call your doctor now or seek immediate medical care if:  ? · You have new or worse nausea or vomiting. ? · You have new or worse pain. ? · Your hand or fingers are cool or pale or change color. ? · Your cast or splint feels too tight. ? · You have tingling, weakness, or numbness in your hand or fingers. ? Watch closely for changes in your health, and be sure to contact your doctor if:  ? · You do not get better as expected. ? · You have problems with your cast or splint. Where can you learn more? Go to http://jemima-louisa.info/. Enter T129 in the search box to learn more about \"Broken Arm: Care Instructions. \"  Current as of: March 21, 2017  Content Version: 11.4  © 1393-8285 [employer]. Care instructions adapted under license by [employer] (which disclaims liability or warranty for this information). If you have questions about a medical condition or this instruction, always ask your healthcare professional. [place] [address] any warranty or liability for your use of this information.

## 2018-02-14 NOTE — CONSULTS
Orthopedic CONSULT NOTE    Subjective:     Date of Consultation:  February 13, 2018      Martha Velasquez is a 80 y.o. female who is being seen for right wrist pain after fall. Injury occurred  today while Pt. was standing on a block trying to reach dog food. Workup has revealed right wrist fracture. Patient's ambulatory status includes None and their living environment is home. Pt. Denies head trauma/LOC during injury. Pt. Is right hand dominant. .    Patient Active Problem List    Diagnosis Date Noted    Right wrist fracture 02/13/2018    Widened pulse pressure 09/29/2017    Pulmonary hypertension 02/01/2016    Hyperlipidemia 06/18/2014    Hypothyroid 03/18/2014    S/P lobectomy of lung     Atrial fibrillation ([facility] [address] 03/20/2012    Hypertension 02/10/2010     Family History   Problem Relation Age of Onset    Cancer Mother       Social History   Substance Use Topics    Smoking status: Never Smoker    Smokeless tobacco: Never Used    Alcohol use No     Past Medical History:   Diagnosis Date    Atrial fibrillation ([facility] [address]     Hyperlipidemia 6/18/2014    Hypertension     Hypothyroid 3/18/2014    Pulmonary hypertension 2/1/2016 1/16 - PASP=68, EF 55%     S/P lobectomy of lung       Past Surgical History:   Procedure Laterality Date    CHEST SURGERY PROCEDURE UNLISTED  1951    lower lobe right lung    HX APPENDECTOMY  2008      Prior to Admission medications    Medication Sig Start Date End Date Taking? Authorizing Provider   flecainide (TAMBOCOR) 100 mg tablet Take 1 Tab by mouth three (3) times daily. 12/18/17  Yes Raul Girard MD   levothyroxine (SYNTHROID) 50 mcg tablet TAKE 1 TABLET BY MOUTH EVERY MORNING BEFORE BREAKFAST FOR THYROID 6/20/17  Yes Raul Girard MD   hydroCHLOROthiazide (HYDRODIURIL) 25 mg tablet Take 1 Tab by mouth daily. 5/30/17  Yes Raul Girard MD   warfarin (COUMADIN) 5 mg tablet Take 5 mg by mouth daily. Patient is currently taking coumadin taking 5mg Mon-Fri, 7. 5 mg Sat and Sun. Indications: S, S   7.5mg. Yes Historical Provider   amLODIPine (NORVASC) 5 mg tablet Take 1 Tab by mouth daily. 17   Ynes Mcdermott MD   doxazosin (CARDURA) 8 mg tablet Take 0.5 tablet twice daily. Patient taking differently: daily. Take 0.5 tablet twice daily. 10/23/17   Ynes Mcdermott MD     Current Facility-Administered Medications   Medication Dose Route Frequency    lidocaine (PF) (XYLOCAINE) 10 mg/mL (1 %) injection 10 mL  10 mL SubCUTAneous ONCE     Current Outpatient Prescriptions   Medication Sig    flecainide (TAMBOCOR) 100 mg tablet Take 1 Tab by mouth three (3) times daily.  levothyroxine (SYNTHROID) 50 mcg tablet TAKE 1 TABLET BY MOUTH EVERY MORNING BEFORE BREAKFAST FOR THYROID    hydroCHLOROthiazide (HYDRODIURIL) 25 mg tablet Take 1 Tab by mouth daily.  warfarin (COUMADIN) 5 mg tablet Take 5 mg by mouth daily. Patient is currently taking coumadin taking 5mg Mon-Fri, 7. 5 mg Sat and Sun. Indications: S, S   7.5mg.    amLODIPine (NORVASC) 5 mg tablet Take 1 Tab by mouth daily.  doxazosin (CARDURA) 8 mg tablet Take 0.5 tablet twice daily. (Patient taking differently: daily. Take 0.5 tablet twice daily.)      No Known Allergies     Review of Systems:  A comprehensive review of systems was negative except for that written in the HPI. Mental Status: no dementia    Objective:     Patient Vitals for the past 8 hrs:   BP Temp Pulse Resp SpO2 Height Weight   18 188/52 98.1 °F (36.7 °C) 80 16 100 % 5' 3\" (1.6 m) 67.8 kg (149 lb 7.6 oz)     Temp (24hrs), Av.1 °F (36.7 °C), Min:98.1 °F (36.7 °C), Max:98.1 °F (36.7 °C)      EXAM: alert, cooperative, no distress, oriented, normal, normal mood,   Pt. Is TTP over right wrist with swelling and deformity. .    Imaging Review: XRStudy Result   EXAM: XR WRIST RT AP/LAT/OBL MIN 3V     INDICATION:  R wrist pain.   Fall today     COMPARISON: None.     FINDINGS: Three  views of the right wrist demonstrate a comminuted impacted  intra-articular fracture of the distal radius. There is volar angulation at the  fracture line with resulting dorsal angulation of the radiocarpal joint. There  is a fracture at the base of the ulnar styloid. Subchondral degenerative changes  are noted in the capitate. There is degeneration of the triscaphe the joint. There is soft tissue swelling     IMPRESSION  IMPRESSION:    1. Comminuted intra-articular fracture distal radius  2. Fracture base of ulnar styloid age-indeterminate            Labs: No results found for this or any previous visit (from the past 24 hour(s)). Impression:     Patient Active Problem List    Diagnosis Date Noted    Right wrist fracture 02/13/2018    Widened pulse pressure 09/29/2017    Pulmonary hypertension 02/01/2016    Hyperlipidemia 06/18/2014    Hypothyroid 03/18/2014    S/P lobectomy of lung     Atrial fibrillation ([facility] [address] 03/20/2012    Hypertension 02/10/2010     Principal Problem:    Right wrist fracture (2/13/2018)        Plan:   Pt. Stable  Non-Operative Management at this time. Pt. Immobilized with sugartong splint after closed reduction using hematoma block. procedure tolerated well  Post position confirmed with xray  . Pain meds per primary care team.  Pt. To be  discharged to home. F/u with Dr. Tevin Graham* within 1 week  Dr. Tevin Graham aware and agree with above plan.       Khurram Byrd

## 2018-02-15 ENCOUNTER — ANESTHESIA EVENT (OUTPATIENT)
Dept: SURGERY | Age: 83
End: 2018-02-15
Payer: MEDICARE

## 2018-02-15 NOTE — PERIOP NOTES
[facility]  Ambulatory Surgery Unit  Pre-operative Instructions    Surgery/Procedure Date  2/16/18            Tentative Arrival Time 1100      1. On the day of your surgery/procedure, please report to the Ambulatory Surgery Unit Registration Desk and sign in at your designated time. The Ambulatory Surgery Unit is located in [facility] on the Critical access hospital side of the [facility] across from the [address] [place] [address]. Please have all of your health insurance cards and a photo ID. 2. You must have someone with you to drive you home, as you should not drive a car for 24 hours following anesthesia. Please make arrangements for a responsible adult friend or family member to stay with you for at least the first 24 hours after your surgery. 3. Do not have anything to eat or drink (including water, gum, mints, coffee, juice) after midnight   2/15/18. This may not apply to medications prescribed by your physician. (Please note below the special instructions with medications to take the morning of surgery, if applicable.)    4. We recommend you do not drink any alcoholic beverages for 24 hours before and after your surgery. 5. Contact your surgeons office for instructions on the following medications: non-steroidal anti-inflammatory drugs (i.e. Advil, Aleve), vitamins, and supplements. (Some surgeons will want you to stop these medications prior to surgery and others may allow you to take them)   **If you are currently taking Plavix, Coumadin, Aspirin and/or other blood-thinning agents, contact your surgeon for instructions. ** Your surgeon will partner with the physician prescribing these medications to determine if it is safe to stop or if you need to continue taking. Please do not stop taking these medications without instructions from your surgeon.     6. In an effort to help prevent surgical site infection, we ask that you shower with an anti-bacterial soap (i.e. Dial or Safeguard) for 3 days prior to and on the morning of surgery, using a fresh towel after each shower. (Please begin this process with fresh bed linens.) Do not apply any lotions, powders, or deodorants after the shower on the day of your procedure. If applicable, please do not shave the operative site for 48 hours prior to surgery. 7. Wear comfortable clothes. Wear glasses instead of contacts. Do not bring any jewelry or money (other than copays or fees as instructed). Do not wear make-up, particularly mascara, the morning of your surgery. Do not wear nail polish, particularly if you are having foot /hand surgery. Wear your hair loose or down, no ponytails, buns, da pins or clips. All body piercings must be removed. 8. You should understand that if you do not follow these instructions your surgery may be cancelled. If your physical condition changes (i.e. fever, cold or flu) please contact your surgeon as soon as possible. 9. It is important that you be on time. If a situation occurs where you may be late, or if you have any questions or problems, please call [phone].    10. Your surgery time may be subject to change. You will receive a phone call the day prior to surgery to confirm your arrival time. 11. Pediatric patients: please bring a change of clothes, diapers, bottle/sippy cup, pacifier, etc.      Special Instructions: Take all medications and inhalers, as prescribed, on the morning of surgery with a sip of water EXCEPT: none      I understand a pre-operative phone call will be made to verify my surgery time. In the event that I am not available, I give permission for a message to be left on my answering service and/or with another person?       Yes     (instructions given verbally during phone assessment- pt voiced understanding)     ___________________      ___________________      ________________  (Signature of Patient)          (Witness)                   (Date and Time)

## 2018-02-16 ENCOUNTER — HOSPITAL ENCOUNTER (OUTPATIENT)
Age: 83
Setting detail: OUTPATIENT SURGERY
Discharge: HOME | End: 2018-02-16
Attending: ORTHOPAEDIC SURGERY | Admitting: ORTHOPAEDIC SURGERY
Payer: MEDICARE

## 2018-02-16 ENCOUNTER — ANESTHESIA (OUTPATIENT)
Dept: SURGERY | Age: 83
End: 2018-02-16
Payer: MEDICARE

## 2018-02-16 VITALS
DIASTOLIC BLOOD PRESSURE: 46 MMHG | WEIGHT: 144.5 LBS | OXYGEN SATURATION: 96 % | RESPIRATION RATE: 20 BRPM | BODY MASS INDEX: 23.22 KG/M2 | SYSTOLIC BLOOD PRESSURE: 130 MMHG | HEART RATE: 66 BPM | TEMPERATURE: 98.2 F | HEIGHT: 66 IN

## 2018-02-16 DIAGNOSIS — G89.18 POSTOPERATIVE PAIN OF EXTREMITY: Primary | ICD-10-CM

## 2018-02-16 DIAGNOSIS — M79.609 POSTOPERATIVE PAIN OF EXTREMITY: Primary | ICD-10-CM

## 2018-02-16 LAB
ANION GAP BLD CALC-SCNC: 14 MMOL/L (ref 5–15)
BUN BLD-MCNC: 30 MG/DL (ref 9–20)
CA-I BLD-MCNC: 1.19 MMOL/L (ref 1.12–1.32)
CHLORIDE BLD-SCNC: 97 MMOL/L (ref 98–107)
CO2 BLD-SCNC: 29 MMOL/L (ref 21–32)
CREAT BLD-MCNC: 1.1 MG/DL (ref 0.6–1.3)
GLUCOSE BLD-MCNC: 100 MG/DL (ref 65–100)
HCT VFR BLD CALC: 36 % (ref 35–47)
HGB BLD-MCNC: 12.2 GM/DL (ref 11.5–16)
INR BLD: 1.4 (ref 0.9–1.2)
POTASSIUM BLD-SCNC: 3.8 MMOL/L (ref 3.5–5.1)
SERVICE CMNT-IMP: ABNORMAL
SODIUM BLD-SCNC: 136 MMOL/L (ref 136–145)

## 2018-02-16 PROCEDURE — 74011250636 HC RX REV CODE- 250/636: Performed by: ORTHOPAEDIC SURGERY

## 2018-02-16 PROCEDURE — 77030020274 HC MISC IMPL ORTHOPEDIC: Performed by: ORTHOPAEDIC SURGERY

## 2018-02-16 PROCEDURE — 76030000022 HC AMB SURG 2.5 TO 3 HR INTENSV-TIER 1: Performed by: ORTHOPAEDIC SURGERY

## 2018-02-16 PROCEDURE — 76210000034 HC AMBSU PH I REC 0.5 TO 1 HR: Performed by: ORTHOPAEDIC SURGERY

## 2018-02-16 PROCEDURE — 74011000250 HC RX REV CODE- 250

## 2018-02-16 PROCEDURE — C1713 ANCHOR/SCREW BN/BN,TIS/BN: HCPCS | Performed by: ORTHOPAEDIC SURGERY

## 2018-02-16 PROCEDURE — 74011250636 HC RX REV CODE- 250/636

## 2018-02-16 PROCEDURE — 77030031139 HC SUT VCRL2 J&J -A: Performed by: ORTHOPAEDIC SURGERY

## 2018-02-16 PROCEDURE — 76060000065 HC AMB SURG ANES 2.5 TO 3 HR: Performed by: ORTHOPAEDIC SURGERY

## 2018-02-16 PROCEDURE — 77030000032 HC CUF TRNQT ZIMM -B: Performed by: ORTHOPAEDIC SURGERY

## 2018-02-16 PROCEDURE — 85610 PROTHROMBIN TIME: CPT

## 2018-02-16 PROCEDURE — 77030020255 HC SOL INJ LR 1000ML BG: Performed by: ORTHOPAEDIC SURGERY

## 2018-02-16 PROCEDURE — 77030021352 HC CBL LD SYS DISP COVD -B: Performed by: ORTHOPAEDIC SURGERY

## 2018-02-16 PROCEDURE — 80047 BASIC METABLC PNL IONIZED CA: CPT

## 2018-02-16 PROCEDURE — 74011250637 HC RX REV CODE- 250/637: Performed by: ORTHOPAEDIC SURGERY

## 2018-02-16 PROCEDURE — 77030018836 HC SOL IRR NACL ICUM -A: Performed by: ORTHOPAEDIC SURGERY

## 2018-02-16 PROCEDURE — 77030029434 HC STOCK ANTIEMB KNEE -A: Performed by: ORTHOPAEDIC SURGERY

## 2018-02-16 PROCEDURE — 77030021122 HC SPLNT MAT FST BSNM -A: Performed by: ORTHOPAEDIC SURGERY

## 2018-02-16 PROCEDURE — 74011250636 HC RX REV CODE- 250/636: Performed by: ANESTHESIOLOGY

## 2018-02-16 PROCEDURE — A4565 SLINGS: HCPCS | Performed by: ORTHOPAEDIC SURGERY

## 2018-02-16 PROCEDURE — 77030022629 HC BIT DRL QC METS -B: Performed by: ORTHOPAEDIC SURGERY

## 2018-02-16 PROCEDURE — 77030002916 HC SUT ETHLN J&J -A: Performed by: ORTHOPAEDIC SURGERY

## 2018-02-16 PROCEDURE — 76210000050 HC AMBSU PH II REC 0.5 TO 1 HR: Performed by: ORTHOPAEDIC SURGERY

## 2018-02-16 PROCEDURE — 77030013079 HC BLNKT BAIR HGGR 3M -A: Performed by: ANESTHESIOLOGY

## 2018-02-16 PROCEDURE — 77030020268 HC MISC GENERAL SUPPLY: Performed by: ORTHOPAEDIC SURGERY

## 2018-02-16 RX ORDER — MIDAZOLAM HYDROCHLORIDE 1 MG/ML
INJECTION, SOLUTION INTRAMUSCULAR; INTRAVENOUS
Status: COMPLETED
Start: 2018-02-16 | End: 2018-02-16

## 2018-02-16 RX ORDER — LIDOCAINE HYDROCHLORIDE 10 MG/ML
0.1 INJECTION, SOLUTION EPIDURAL; INFILTRATION; INTRACAUDAL; PERINEURAL AS NEEDED
Status: DISCONTINUED | OUTPATIENT
Start: 2018-02-16 | End: 2018-02-16 | Stop reason: HOSPADM

## 2018-02-16 RX ORDER — MIDAZOLAM HYDROCHLORIDE 1 MG/ML
INJECTION, SOLUTION INTRAMUSCULAR; INTRAVENOUS AS NEEDED
Status: DISCONTINUED | OUTPATIENT
Start: 2018-02-16 | End: 2018-02-16

## 2018-02-16 RX ORDER — MORPHINE SULFATE 10 MG/ML
2 INJECTION, SOLUTION INTRAMUSCULAR; INTRAVENOUS
Status: DISCONTINUED | OUTPATIENT
Start: 2018-02-16 | End: 2018-02-16 | Stop reason: HOSPADM

## 2018-02-16 RX ORDER — HYDROMORPHONE HYDROCHLORIDE 1 MG/ML
.2-.5 INJECTION, SOLUTION INTRAMUSCULAR; INTRAVENOUS; SUBCUTANEOUS ONCE
Status: DISCONTINUED | OUTPATIENT
Start: 2018-02-16 | End: 2018-02-16 | Stop reason: HOSPADM

## 2018-02-16 RX ORDER — OXYCODONE AND ACETAMINOPHEN 5; 325 MG/1; MG/1
1 TABLET ORAL
Status: DISCONTINUED | OUTPATIENT
Start: 2018-02-16 | End: 2018-02-16 | Stop reason: HOSPADM

## 2018-02-16 RX ORDER — OXYCODONE HYDROCHLORIDE 5 MG/1
5 TABLET ORAL
Status: COMPLETED | OUTPATIENT
Start: 2018-02-16 | End: 2018-02-16

## 2018-02-16 RX ORDER — ROPIVACAINE HYDROCHLORIDE 5 MG/ML
INJECTION, SOLUTION EPIDURAL; INFILTRATION; PERINEURAL AS NEEDED
Status: DISCONTINUED | OUTPATIENT
Start: 2018-02-16 | End: 2018-02-16 | Stop reason: HOSPADM

## 2018-02-16 RX ORDER — ROPIVACAINE HYDROCHLORIDE 5 MG/ML
INJECTION, SOLUTION EPIDURAL; INFILTRATION; PERINEURAL
Status: COMPLETED
Start: 2018-02-16 | End: 2018-02-16

## 2018-02-16 RX ORDER — PROPOFOL 10 MG/ML
INJECTION, EMULSION INTRAVENOUS
Status: DISCONTINUED | OUTPATIENT
Start: 2018-02-16 | End: 2018-02-16 | Stop reason: HOSPADM

## 2018-02-16 RX ORDER — FENTANYL CITRATE 50 UG/ML
25 INJECTION, SOLUTION INTRAMUSCULAR; INTRAVENOUS
Status: COMPLETED | OUTPATIENT
Start: 2018-02-16 | End: 2018-02-16

## 2018-02-16 RX ORDER — SODIUM CHLORIDE 0.9 % (FLUSH) 0.9 %
5-10 SYRINGE (ML) INJECTION AS NEEDED
Status: DISCONTINUED | OUTPATIENT
Start: 2018-02-16 | End: 2018-02-16 | Stop reason: HOSPADM

## 2018-02-16 RX ORDER — LIDOCAINE HYDROCHLORIDE AND EPINEPHRINE 20; 5 MG/ML; UG/ML
INJECTION, SOLUTION EPIDURAL; INFILTRATION; INTRACAUDAL; PERINEURAL AS NEEDED
Status: DISCONTINUED | OUTPATIENT
Start: 2018-02-16 | End: 2018-02-16 | Stop reason: HOSPADM

## 2018-02-16 RX ORDER — FENTANYL CITRATE 50 UG/ML
INJECTION, SOLUTION INTRAMUSCULAR; INTRAVENOUS
Status: DISCONTINUED
Start: 2018-02-16 | End: 2018-02-16 | Stop reason: HOSPADM

## 2018-02-16 RX ORDER — SODIUM CHLORIDE, SODIUM LACTATE, POTASSIUM CHLORIDE, CALCIUM CHLORIDE 600; 310; 30; 20 MG/100ML; MG/100ML; MG/100ML; MG/100ML
25 INJECTION, SOLUTION INTRAVENOUS CONTINUOUS
Status: DISCONTINUED | OUTPATIENT
Start: 2018-02-16 | End: 2018-02-16 | Stop reason: HOSPADM

## 2018-02-16 RX ORDER — ACETAMINOPHEN 10 MG/ML
INJECTION, SOLUTION INTRAVENOUS
Status: DISCONTINUED
Start: 2018-02-16 | End: 2018-02-16 | Stop reason: HOSPADM

## 2018-02-16 RX ORDER — DIPHENHYDRAMINE HYDROCHLORIDE 50 MG/ML
12.5 INJECTION, SOLUTION INTRAMUSCULAR; INTRAVENOUS AS NEEDED
Status: DISCONTINUED | OUTPATIENT
Start: 2018-02-16 | End: 2018-02-16 | Stop reason: HOSPADM

## 2018-02-16 RX ORDER — OXYCODONE AND ACETAMINOPHEN 5; 325 MG/1; MG/1
1 TABLET ORAL
Qty: 25 TAB | Refills: 0 | Status: SHIPPED | OUTPATIENT
Start: 2018-02-16 | End: 2018-03-30

## 2018-02-16 RX ORDER — SODIUM CHLORIDE 0.9 % (FLUSH) 0.9 %
5-10 SYRINGE (ML) INJECTION EVERY 8 HOURS
Status: DISCONTINUED | OUTPATIENT
Start: 2018-02-16 | End: 2018-02-16 | Stop reason: HOSPADM

## 2018-02-16 RX ORDER — MIDAZOLAM HYDROCHLORIDE 1 MG/ML
INJECTION, SOLUTION INTRAMUSCULAR; INTRAVENOUS AS NEEDED
Status: DISCONTINUED | OUTPATIENT
Start: 2018-02-16 | End: 2018-02-16 | Stop reason: HOSPADM

## 2018-02-16 RX ORDER — HYDROCODONE BITARTRATE AND ACETAMINOPHEN 5; 325 MG/1; MG/1
1 TABLET ORAL
Qty: 25 TAB | Refills: 0 | Status: SHIPPED | OUTPATIENT
Start: 2018-02-16 | End: 2018-02-20

## 2018-02-16 RX ORDER — FENTANYL CITRATE 50 UG/ML
25 INJECTION, SOLUTION INTRAMUSCULAR; INTRAVENOUS ONCE
Status: COMPLETED | OUTPATIENT
Start: 2018-02-16 | End: 2018-02-16

## 2018-02-16 RX ORDER — LIDOCAINE HYDROCHLORIDE AND EPINEPHRINE 20; 5 MG/ML; UG/ML
INJECTION, SOLUTION EPIDURAL; INFILTRATION; INTRACAUDAL; PERINEURAL
Status: COMPLETED
Start: 2018-02-16 | End: 2018-02-16

## 2018-02-16 RX ORDER — ACETAMINOPHEN 10 MG/ML
1000 INJECTION, SOLUTION INTRAVENOUS ONCE
Status: COMPLETED | OUTPATIENT
Start: 2018-02-16 | End: 2018-02-16

## 2018-02-16 RX ORDER — CEFAZOLIN SODIUM/WATER 2 G/20 ML
2 SYRINGE (ML) INTRAVENOUS ONCE
Status: COMPLETED | OUTPATIENT
Start: 2018-02-16 | End: 2018-02-16

## 2018-02-16 RX ADMIN — SODIUM CHLORIDE, SODIUM LACTATE, POTASSIUM CHLORIDE, AND CALCIUM CHLORIDE 25 ML/HR: 600; 310; 30; 20 INJECTION, SOLUTION INTRAVENOUS at 11:58

## 2018-02-16 RX ADMIN — ROPIVACAINE HYDROCHLORIDE 17 ML: 5 INJECTION, SOLUTION EPIDURAL; INFILTRATION; PERINEURAL at 12:14

## 2018-02-16 RX ADMIN — PROPOFOL 75 MCG/KG/MIN: 10 INJECTION, EMULSION INTRAVENOUS at 13:22

## 2018-02-16 RX ADMIN — Medication 2 G: at 13:23

## 2018-02-16 RX ADMIN — MIDAZOLAM HYDROCHLORIDE 1 MG: 1 INJECTION, SOLUTION INTRAMUSCULAR; INTRAVENOUS at 13:13

## 2018-02-16 RX ADMIN — MIDAZOLAM HYDROCHLORIDE 1 MG: 1 INJECTION, SOLUTION INTRAMUSCULAR; INTRAVENOUS at 12:09

## 2018-02-16 RX ADMIN — LIDOCAINE HYDROCHLORIDE AND EPINEPHRINE 17 ML: 20; 5 INJECTION, SOLUTION EPIDURAL; INFILTRATION; INTRACAUDAL; PERINEURAL at 12:14

## 2018-02-16 RX ADMIN — ACETAMINOPHEN 1000 MG: 10 INJECTION, SOLUTION INTRAVENOUS at 16:30

## 2018-02-16 RX ADMIN — FENTANYL CITRATE 25 MCG: 50 INJECTION, SOLUTION INTRAMUSCULAR; INTRAVENOUS at 16:40

## 2018-02-16 RX ADMIN — FENTANYL CITRATE 25 MCG: 50 INJECTION, SOLUTION INTRAMUSCULAR; INTRAVENOUS at 16:28

## 2018-02-16 RX ADMIN — OXYCODONE HYDROCHLORIDE 5 MG: 5 TABLET ORAL at 16:54

## 2018-02-16 RX ADMIN — FENTANYL CITRATE 25 MCG: 50 INJECTION, SOLUTION INTRAMUSCULAR; INTRAVENOUS at 16:18

## 2018-02-16 RX ADMIN — FENTANYL CITRATE 25 MCG: 50 INJECTION, SOLUTION INTRAMUSCULAR; INTRAVENOUS at 17:10

## 2018-02-16 RX ADMIN — FENTANYL CITRATE 25 MCG: 50 INJECTION, SOLUTION INTRAMUSCULAR; INTRAVENOUS at 16:35

## 2018-02-16 NOTE — ANESTHESIA POSTPROCEDURE EVALUATION
Post-Anesthesia Evaluation and Assessment    Patient: Nikki Rebollar MRN: [id number]  SSN: [id number]    YOB: 1931  Age: 80 y.o. Sex: female       Cardiovascular Function/Vital Signs  Visit Vitals    /50    Pulse 72    Temp 36.8 °C (98.2 °F)    Resp 20    Ht 5' 6\" (1.676 m)    Wt 65.5 kg (144 lb 8 oz)    SpO2 100%    BMI 23.32 kg/m2       Patient is status post regional anesthesia for Procedure(s):  OPEN REDUCTION INTERNAL FIXATION RIGHT DISTAL RADIUS (BLK ANES). Nausea/Vomiting: None    Postoperative hydration reviewed and adequate. Pain:  Pain Scale 1: Numeric (0 - 10) (02/16/18 1630)  Pain Intensity 1: 8 (02/16/18 1630)   Managed    Neurological Status:   Neuro (WDL): Within Defined Limits (02/16/18 1142)   At baseline    Mental Status and Level of Consciousness: Arousable    Pulmonary Status:   O2 Device: Room air (02/16/18 1615)   Adequate oxygenation and airway patent    Complications related to anesthesia: None    Post-anesthesia assessment completed.  No concerns    Signed By: Drew Severino MD     February 16, 2018

## 2018-02-16 NOTE — DISCHARGE INSTRUCTIONS
>>>You received an IV form of Tylenol 1000mg during your surgery, you may take tylenol (or pain medication containing Tylenol or Acetaminophen) in 6 hours at 10:30pm and had 1 oxycodone at 5:00pm          Discharge Instructions for:    Ana Martinez / [id number] : 10/29/1931    Admitted 2018 Discharged: 2018       Postoperative Hand Surgery Instructions      Elevation:  Elevation is one of the most important things to do following your surgery. Not only does it decrease swelling, but it also decreases pain. For hand or wrist surgery, keep the arm in your sling while up and about, with your hand above your elbow. When lying down, keep your arm propped up on a few pillows, so that your fingers are pointing towards the ceiling. Finger Motion:  **It is very important that you begin moving your fingers immediately after surgery, as often as you can, in order to prevent stiffness. Wiggling your fingers is not the same as moving them - moving your fingers involves bending them until they touch the dressing   (if possible). You should use your other hand to gently move the fingers on the operative hand if necessary. Dressings:  Please leave the surgical dressing in place until you are seen in the office for your first post-operative appointment with Dr Celia Mcdermott. The dressing helps to prevent infection and positions the extremity to protect the surgical procedure  that was performed. Bathing and showering are allowed as long as the dressing is kept dry. To keep your dressing dry while bathing, simply place a plastic bag (bread bag, newspaper bag, trash bag or subway sandwich bag) over the dressing and seal it with tape or a rubber band. Medications: You have likely been given a prescription for pain medication. Please follow the instructions closely.   It is safe to take up to 600mg of Ibuprofen (Advil or Motrin) along with the pain prescription as long you are not allergic to it, are not on blood thinners, and do not have gastric reflux or an ulcer. However, do not take any additional tylenol/acetaminophen if your prescription contains tylenol. Note that my policy is to give only one prescription for pain medication at the time of the surgery - if you use it up quickly, then you will have no more pain medication left after only a few days, and I will not give you another prescription. So use your pain medication sparingly, and only when necessary! If you have new or increasing numbness after any numbing medicine wears off (12-24 hours for regional blocks, 3 hours for local), call the office immediately. If you experience nausea, vomiting or itching with the pain medication, please call the office during regular office hours. Refills for pain medication prescriptions ARE NOT given on the weekend or after regular office hours. If antibiotics have been prescribed, please be sure to complete the entire prescription. Post-Operative Appointments:  Dr. Vinod Vogel likes to see you back in the office about 10-14 days following your surgery to change the post-operative dressing and remove any necessary sutures or staples. If you have not received a follow up appointment card please contact our office at [phone]. *If you have any questions or concerns or experience any sudden changes in your condition, please call our office at [phone]*      DO NOT TAKE TYLENOL/ACETAMINOPHEN WITH NORCO AND PERCOCET    TAKE NARCOTIC PAIN MEDICATIONS WITH FOOD! If given 2 pain narcotics do NOT take together! For the night of surgery, while block is still in effect, start with 1 pain pill at bedtime. Narcotics tend to be constipating and we recommend taking a stool softener such as Colace or Miralax (follow package instructions). If you were given prescriptions, please review the written information on the prescribed medications.     DO NOT DRIVE WHILE TAKING NARCOTIC PAIN MEDICATIONS. CPAP PATIENTS BE SURE TO WEAR MACHINE WHENEVER NAPPING OR SLEEPING DAY/NIGHT OF SURGERY. DISCHARGE SUMMARY from Nurse    The following personal items collected during your admission are returned to you:   Dental Appliance: Dental Appliances: None, Uppers, Lowers, At home  Vision: Visual Aid: Glasses  Hearing Aid:    Jewelry:    Clothing: Clothing:  (clothing under stretcher)  Other Valuables:    Valuables sent to safe:        PATIENT INSTRUCTIONS:    After General Anesthesia or Intravenous Sedation, for 24 hours or while taking prescription Narcotics:  · Someone should be with you for the next 24 hours. · For your own safety, a responsible adult must drive you home. · Limit your activities  · Recommended activity: Rest today, up with assistance today. Do not climb stairs or shower unattended for the next 24 hours. · Start with a soft bland diet and advance as tolerated (no nausea) to regular diet. · If you have a sore throat some things that may help are: fluids, warm salt water gargle, or throat lozenges. If this does not improve after several days please follow up with your family physician. · Do not drive and operate hazardous machinery  · Do not make important personal or business decisions  · Do  not drink alcoholic beverages  · If you have not urinated within 8 hours after discharge, please contact your surgeon on call. Report the following to your surgeon:  · Excessive pain, swelling, redness or odor of or around the surgical area  · Temperature over 100.5  · Nausea and vomiting lasting longer than 4 hours or if unable to take medications  · Any signs of decreased circulation or nerve impairment to extremity: change in color, persistent  numbness, tingling, coldness or increase pain    · If you received an upper extremity nerve block, please wear your sling until the block has worn off, then refer to your surgeons post-operative instructions. If you have had a shoulder block or a block near your collar bone, you may have              symptoms such as:          1. Mild shortness of breath        2. A hoarse voice        3. Blurry vision        4. Unequal pupils        5. Drooping of your face on the same side as the nerve block. These symptoms will disappear as the nerve block wears off. · You will receive a Post Operative Call from one of the Recovery Room Nurses on the day after your surgery to check on you. It is very important for us to know how you are recovering after your surgery. If you have an issue please call your surgeon, do not wait for the post operative call. · You may receive an e-mail or letter in the mail from [facility] regarding your experience with us in the Ambulatory Surgery Unit. Your feedback is valuable to us and we appreciate your participation in the survey. ·   · If the above instructions are not adequate, please contact Marlow Habermann, RN, [place] anesthesia Nurse Manager or our Anesthesiologist, at [phone]. If you are having problems after your surgery, call your physician at his office number. ·   · We wish youre a speedy recovery ? What to do at Home:    *  Please give a list of your current medications to your Primary Care Provider. *  Please update this list whenever your medications are discontinued, doses are      changed, or new medications (including over-the-counter products) are added. *  Please carry medication information at all times in case of emergency situations. These are general instructions for a healthy lifestyle:    No smoking/ No tobacco products/ Avoid exposure to second hand smoke    Surgeon General's Warning:  Quitting smoking now greatly reduces serious risk to your health.     Obesity, smoking, and sedentary lifestyle greatly increases your risk for illness    A healthy diet, regular physical exercise & weight monitoring are important for maintaining a healthy lifestyle    You may be retaining fluid if you have a history of heart failure or if you experience any of the following symptoms:  Weight gain of 3 pounds or more overnight or 5 pounds in a week, increased swelling in our hands or feet or shortness of breath while lying flat in bed. Please call your doctor as soon as you notice any of these symptoms; do not wait until your next office visit. Recognize signs and symptoms of STROKE:    B - Balance  E-  Eyes    F-  Face looks uneven    A-  Arms unable to move or move even    S-  Speech slurred or non-existent    T-  Time-call 911 as soon as signs and symptoms begin-DO NOT go       Back to bed or wait to see if you get better-TIME IS BRAIN. If you have not had your influenza or pneumococcal vaccines, please follow up with your primary care physician. The discharge information has been reviewed with the patient and caregiver. The patient and caregiver verbalized understanding.

## 2018-02-16 NOTE — PERIOP NOTES
Neto Thompson  10/29/1931  [id number]    Situation:  Verbal report given from: MICHELL Benson RN and NERI Garcia CRNA  Procedure: Procedure(s):  OPEN REDUCTION INTERNAL FIXATION RIGHT DISTAL RADIUS (CATHYK LEONCIOS)    Background:    Preoperative diagnosis: RIGHT DISTAL RADIUS FRACTURE    Postoperative diagnosis: RIGHT DISTAL RADIUS FRACTURE    :  Dr. Leobardo Thomas    Assistant(s): Circ-1: Erich Ayala RN  Scrub Tech-1: Lynn Sigala  Scrub Tech-Relief: Miles Cobian    Specimens: * No specimens in log *    Assessment:  Intra-procedure medications         Anesthesia gave intra-procedure sedation and medications, see anesthesia flow sheet     Intravenous fluids: LR@ KVO     Vital signs stable.  Pt denies pain-needs to void      Recommendation:    Permission to share finding with family or friend yes-daughters per preop note

## 2018-02-16 NOTE — BRIEF OP NOTE
BRIEF OPERATIVE NOTE    Date of Procedure: 2/16/2018   Preoperative Diagnosis: RIGHT DISTAL RADIUS FRACTURE  Postoperative Diagnosis: RIGHT DISTAL RADIUS FRACTURE    Procedure(s):  OPEN REDUCTION INTERNAL FIXATION RIGHT DISTAL RADIUS (HOLLIS LORENZ)  Surgeon(s) and Role:     * Maco Vargas MD - Primary         Assistant Staff: None      Surgical Staff:  Circ-1: Conrado Cartagena RN  Scrub Tech-1: Duglas Zamora  Event Time In   Incision Start 1338   Incision Close 1541     Anesthesia: Other   Estimated Blood Loss: min  Specimens: * No specimens in log *   Findings: fx   Complications: none  Implants:   Implant Name Type Inv.  Item Serial No.  Lot No. LRB No. Used Action   FPL Volar plate Plate  N/A MEDARTIS N/A Right 1 Implanted   2.5 mm locking screw 12 mm   N/A MEDARTIS N/A Right 1 Implanted   2.5 mm locking screw 14 mm     N/A MEDARTIS N/A Right 1 Implanted

## 2018-02-16 NOTE — OP NOTES
[facility] OP NOTE    Ghada Carrillo  MR#: [id number]  : 10/29/1931  ACCOUNT #: [de-identified]   DATE OF SERVICE: 2018    DATE OF PROCEDURE:  2018      PREOPERATIVE DIAGNOSIS:  Comminuted intra-articular right distal radius fracture with three or more major fragments. POSTOPERATIVE DIAGNOSIS:  Comminuted intra-articular right distal radius fracture with three or more major fragments. PROCEDURE:  Open reduction internal fixation comminuted intra-articular right distal radius fracture with three or more fragments. SURGEON:  Estiven Cha MD     ASSISTANT:  none    ANESTHESIA:  Regional plus sedation. ESTIMATED BLOOD LOSS:  Minimal.    SPECIMENS:  No specimens. COMPLICATIONS:  No complications. IMPLANTS:  A Med Rafiq FPL volar plate and appropriate screws, both locking and nonlocking. SUMMARY:  Patient brought into the operating room, placed on the operating table in supine position and sedation administered. The operative site had been identified in preoperative holding, and regional block administered there. Appropriate preoperative antibiotic prophylaxis had been given. The right upper extremity was prepped and draped in the usual manner. After timeout, the limb was elevated and exsanguinated with an Esmarch bandage, and the tourniquet inflated to 250 mmHg. A longitudinal incision was made overlying the distal volar forearm. The incision was extended radially in order to better expose the proximal portion of the FCR tendon. The FCR tendon was mobilized radially and the posterior aspect of the sheath cut with a 15 blade. This gave access to the pronator quadratus, which was shredded distally. The pronator quadratus was mobilized on an ulnarly based flap. The fracture was identified. It was extremely comminuted and displaced. The brachioradialis was released. The radial styloid fragment was reduced and pinned with a K-wire.   The ulnar fragment was then reduced. An FPL plate was chosen so that we can get it out distal enough, because this was a very distal fracture. The foot plate was held in place with K-wires and visualized. The position was adjusted, and then when it was in acceptable position a screw was placed in the proximal oblong hole. Next, the ulnar most holes were filled with locking pegs. This was followed by filling the radial distal holes. Nice overall and very nice fixation and alignment was obtained. This was confirmed fluoroscopically in the AP and lateral planes. One locking screw was placed in the proximal holes, followed by one more nonlocking screw in the proximal most hole. The wrist was put through a range of motion under fluoroscopy, and the fracture was very stable. Therefore, the wound was thoroughly irrigated. The pronator quadratus was repaired with 3-0 Vicryl suture. Skin was closed with 4-0 nylon suture. Xeroform, 4 x 4's, sterile cast padding and a volar plaster splint were placed, followed by Pietro. The tourniquet was released. Total tourniquet time had been 126 minutes. The patient tolerated the procedure well, was taken back to the PACU in stable condition.       Catarino Meigs, MD CBW / CHERYL  D: 02/16/2018 16:20     T: 02/16/2018 17:29  JOB #: [id number]

## 2018-02-16 NOTE — PERIOP NOTES
Pt states pain is stinging at wrist-worse than break-very new-moving fingers and arm. Fentanyl IVP given. 1630 States pain medication not working -Dr. Jennifer Bland called about changing pain med to Percocet and OK'd IV Tylenol and oycodone here. 1700 Pt states pain still really bad -Spoke to Dr. Ada Esparza and OK'd 25mcg Fentanyl IVP. [id number] Pt states pain better and pt and family agree ready for discharge. 1730 Pt states pain much better.  Discharged with sling to car without incident

## 2018-02-16 NOTE — IP AVS SNAPSHOT
[address] [facility] 
[phone] Patient: Hilario Winston MRN: [id number] NIF:[birth year] About your hospitalization You were admitted on:  February 16, 2018 You last received care in the:  [facility] PACU You were discharged on:  February 16, 2018 Why you were hospitalized Your primary diagnosis was:  Not on File Follow-up Information Follow up With Details Comments Contact Info John Barajas MD   [address] 
[phone] Your Scheduled Appointments Tuesday February 20, 2018 10:45 AM EST  
SAME DAY with John Barajas MD  
[address] ([facility]) [address], [address] [address]  
[phone] Friday March 30, 2018  9:30 AM EDT PHYSICAL PRE OP with John Barajas MD  
[address] ([facility]) [address], [address] [address]  
[phone] Discharge Orders None A check duke indicates which time of day the medication should be taken. My Medications START taking these medications Instructions Each Dose to Equal  
 Morning Noon Evening Bedtime  
 oxyCODONE-acetaminophen 5-325 mg per tablet Commonly known as:  PERCOCET Your last dose was: Your next dose is: Take 1 Tab by mouth every four (4) hours as needed for Pain. Max Daily Amount: 6 Tabs. 1 Tab CHANGE how you take these medications Instructions Each Dose to Equal  
 Morning Noon Evening Bedtime  
 doxazosin 8 mg tablet Commonly known as:  CARDURA What changed:   
- when to take this 
- additional instructions Your last dose was: Your next dose is: Take 0.5 tablet twice daily. * HYDROcodone-acetaminophen 5-325 mg per tablet Commonly known as:  Morena Victoria What changed:  Another medication with the same name was added. Make sure you understand how and when to take each. Your last dose was: Your next dose is: Take 1 Tab by mouth every six (6) hours as needed for Pain. Max Daily Amount: 4 Tabs. 1 Tab  
    
   
   
   
  
 * HYDROcodone-acetaminophen 5-325 mg per tablet Commonly known as:  Morena Rummage What changed: You were already taking a medication with the same name, and this prescription was added. Make sure you understand how and when to take each. Your last dose was: Your next dose is: Take 1 Tab by mouth every four (4) hours as needed for Pain. Max Daily Amount: 6 Tabs. 1 Tab * Notice: This list has 2 medication(s) that are the same as other medications prescribed for you. Read the directions carefully, and ask your doctor or other care provider to review them with you. CONTINUE taking these medications Instructions Each Dose to Equal  
 Morning Noon Evening Bedtime  
 amLODIPine 5 mg tablet Commonly known as:  Herman Álvarez Your last dose was: Your next dose is: Take 1 Tab by mouth daily. 5 mg COUMADIN 5 mg tablet Generic drug:  warfarin Your last dose was: Your next dose is: Take 5 mg by mouth daily. Patient is currently taking coumadin taking 5mg Mon-Fri, 7. 5 mg Sat and Sun. Indications: S, S   7.5mg.  
 5 mg  
    
   
   
   
  
 flecainide 100 mg tablet Commonly known as:  TAMBOCOR Your last dose was: Your next dose is: Take 1 Tab by mouth three (3) times daily. 100 mg  
    
   
   
   
  
 hydroCHLOROthiazide 25 mg tablet Commonly known as:  HYDRODIURIL Your last dose was: Your next dose is: Take 1 Tab by mouth daily. 25 mg  
    
   
   
   
  
 levothyroxine 50 mcg tablet Commonly known as:  SYNTHROID Your last dose was: Your next dose is: TAKE 1 TABLET BY MOUTH EVERY MORNING BEFORE BREAKFAST FOR THYROID Where to Get Your Medications Information on where to get these meds will be given to you by the nurse or doctor. ! Ask your nurse or doctor about these medications HYDROcodone-acetaminophen 5-325 mg per tablet  
 oxyCODONE-acetaminophen 5-325 mg per tablet Discharge Instructions Discharge Instructions for:  
 Blanka Gibson / [id number] : 10/29/1931 Admitted 2018 Discharged: 2018 Postoperative Hand Surgery Instructions Elevation: 
Elevation is one of the most important things to do following your surgery. Not only does it decrease swelling, but it also decreases pain. For hand or wrist surgery, keep the arm in your sling while up and about, with your hand above your elbow. When lying down, keep your arm propped up on a few pillows, so that your fingers are pointing towards the ceiling. Finger Motion: **It is very important that you begin moving your fingers immediately after surgery, as often as you can, in order to prevent stiffness. Wiggling your fingers is not the same as moving them - moving your fingers involves bending them until they touch the dressing  
(if possible). You should use your other hand to gently move the fingers on the operative hand if necessary. Dressings: 
Please leave the surgical dressing in place until you are seen in the office for your first post-operative appointment with Dr Victorino Gross. The dressing helps to prevent infection and positions the extremity to protect the surgical procedure  that was performed. Bathing and showering are allowed as long as the dressing is kept dry.   To keep your dressing dry while bathing, simply place a plastic bag (bread bag, newspaper bag, trash bag or subway sandwich bag) over the dressing and seal it with tape or a rubber band. Medications: You have likely been given a prescription for pain medication. Please follow the instructions closely. It is safe to take up to 600mg of Ibuprofen (Advil or Motrin) along with the pain prescription as long you are not allergic to it, are not on blood thinners, and do not have gastric reflux or an ulcer. However, do not take any additional tylenol/acetaminophen if your prescription contains tylenol. Note that my policy is to give only one prescription for pain medication at the time of the surgery - if you use it up quickly, then you will have no more pain medication left after only a few days, and I will not give you another prescription. So use your pain medication sparingly, and only when necessary! If you have new or increasing numbness after any numbing medicine wears off (12-24 hours for regional blocks, 3 hours for local), call the office immediately. If you experience nausea, vomiting or itching with the pain medication, please call the office during regular office hours. Refills for pain medication prescriptions ARE NOT given on the weekend or after regular office hours. If antibiotics have been prescribed, please be sure to complete the entire prescription. Post-Operative Appointments: 
Dr. Estrellita Seals likes to see you back in the office about 10-14 days following your surgery to change the post-operative dressing and remove any necessary sutures or staples. If you have not received a follow up appointment card please contact our office at [phone]. *If you have any questions or concerns or experience any sudden changes in your condition, please call our office at [phone]* DO NOT TAKE TYLENOL/ACETAMINOPHEN WITH NORCO AND PERCOCET TAKE NARCOTIC PAIN MEDICATIONS WITH FOOD! If given 2 pain narcotics do NOT take together! For the night of surgery, while block is still in effect, start with 1 pain pill at bedtime. Narcotics tend to be constipating and we recommend taking a stool softener such as Colace or Miralax (follow package instructions). If you were given prescriptions, please review the written information on the prescribed medications. DO NOT DRIVE WHILE TAKING NARCOTIC PAIN MEDICATIONS. CPAP PATIENTS BE SURE TO WEAR MACHINE WHENEVER NAPPING OR SLEEPING DAY/NIGHT OF SURGERY. DISCHARGE SUMMARY from Nurse The following personal items collected during your admission are returned to you:  
Dental Appliance: Dental Appliances: None, Uppers, Lowers, At home Vision: Visual Aid: Glasses Hearing Aid:   
Jewelry:   
Clothing: Clothing:  (clothing under stretcher) Other Valuables:   
Valuables sent to safe:   
 
 
PATIENT INSTRUCTIONS: 
 
After General Anesthesia or Intravenous Sedation, for 24 hours or while taking prescription Narcotics: · Someone should be with you for the next 24 hours. · For your own safety, a responsible adult must drive you home. · Limit your activities · Recommended activity: Rest today, up with assistance today. Do not climb stairs or shower unattended for the next 24 hours. · Start with a soft bland diet and advance as tolerated (no nausea) to regular diet. · If you have a sore throat some things that may help are: fluids, warm salt water gargle, or throat lozenges. If this does not improve after several days please follow up with your family physician. · Do not drive and operate hazardous machinery · Do not make important personal or business decisions · Do  not drink alcoholic beverages · If you have not urinated within 8 hours after discharge, please contact your surgeon on call. Report the following to your surgeon: 
· Excessive pain, swelling, redness or odor of or around the surgical area · Temperature over 100.5 · Nausea and vomiting lasting longer than 4 hours or if unable to take medications · Any signs of decreased circulation or nerve impairment to extremity: change in color, persistent  numbness, tingling, coldness or increase pain · If you received an upper extremity nerve block, please wear your sling until the block has worn off, then refer to your surgeons post-operative instructions. If you have had a shoulder block or a block near your collar bone, you may have              symptoms such as: 1. Mild shortness of breath 2. A hoarse voice 3. Blurry vision 4. Unequal pupils 5. Drooping of your face on the same side as the nerve block. These symptoms will disappear as the nerve block wears off. · You will receive a Post Operative Call from one of the Recovery Room Nurses on the day after your surgery to check on you. It is very important for us to know how you are recovering after your surgery. If you have an issue please call your surgeon, do not wait for the post operative call. · You may receive an e-mail or letter in the mail from [employer] regarding your experience with us in the Ambulatory Surgery Unit. Your feedback is valuable to us and we appreciate your participation in the survey. ·  
· If the above instructions are not adequate, please contact Mikki Jimenez RN, [place] anesthesia Nurse Manager or our Anesthesiologist, at [phone]. If you are having problems after your surgery, call your physician at his office number. ·  
· We wish youre a speedy recovery ? What to do at Home: *  Please give a list of your current medications to your Primary Care Provider. *  Please update this list whenever your medications are discontinued, doses are 
    changed, or new medications (including over-the-counter products) are added. *  Please carry medication information at all times in case of emergency situations. These are general instructions for a healthy lifestyle: No smoking/ No tobacco products/ Avoid exposure to second hand smoke Surgeon General's Warning:  Quitting smoking now greatly reduces serious risk to your health. Obesity, smoking, and sedentary lifestyle greatly increases your risk for illness A healthy diet, regular physical exercise & weight monitoring are important for maintaining a healthy lifestyle You may be retaining fluid if you have a history of heart failure or if you experience any of the following symptoms:  Weight gain of 3 pounds or more overnight or 5 pounds in a week, increased swelling in our hands or feet or shortness of breath while lying flat in bed. Please call your doctor as soon as you notice any of these symptoms; do not wait until your next office visit. Recognize signs and symptoms of STROKE: 
 
B - Balance E-  Eyes F-  Face looks uneven A-  Arms unable to move or move even S-  Speech slurred or non-existent T-  Time-call 911 as soon as signs and symptoms begin-DO NOT go Back to bed or wait to see if you get better-TIME IS BRAIN. If you have not had your influenza or pneumococcal vaccines, please follow up with your primary care physician. The discharge information has been reviewed with the patient and caregiver. The patient and caregiver verbalized understanding. ACO Transitions of Care Introducing [employer] [address] Jennifer Gtz offers a voluntary care coordination program to provide high quality service and care to [facility] fee-for-service beneficiaries. Viet Rodriguez was designed to help you enhance your health and well-being through the following services: ? Transitions of Care  support for individuals who are transitioning from one care setting to another (example: Hospital to home). ?  Chronic and Complex Care Coordination  support for individuals and caregivers of those with serious or chronic illnesses or with more than one chronic (ongoing) condition and those who take a number of different medications. If you meet specific medical criteria, a [facility] [address] may call you directly to coordinate your care with your primary care physician and your other care providers. For questions about the [facility] programs, please, contact your physicians office. For general questions or additional information about Accountable Care Organizations: 
Please visit www.medicare.gov/acos. html or call 1-800-MEDICARE (0-862.393.7672) TTY users should call 6-179.935.2040. Introducing [facility]! [facility] introduces [employer] patient portal. Now you can access parts of your medical record, email your doctor's office, and request medication refills online. 1. In your internet browser, go to [email or web address] 2. Click on the First Time User? Click Here link in the Sign In box. You will see the New Member Sign Up page. 3. Enter your [employer] Access Code exactly as it appears below. You will not need to use this code after youve completed the sign-up process. If you do not sign up before the expiration date, you must request a new code. · [employer] Access Code: [email or web address] Expires: 5/14/2018 10:22 PM 
 
4. Enter the last four digits of your Social Security Number (xxxx) and Date of Birth (mm/dd/yyyy) as indicated and click Submit. You will be taken to the next sign-up page. 5. Create a [employer] ID. This will be your [employer] login ID and cannot be changed, so think of one that is secure and easy to remember. 6. Create a [employer] password. You can change your password at any time. 7. Enter your Password Reset Question and Answer. This can be used at a later time if you forget your password. 8. Enter your e-mail address.  You will receive e-mail notification when new information is available in [employer]. 9. Click Sign Up. You can now view and download portions of your medical record. 10. Click the Download Summary menu link to download a portable copy of your medical information. If you have questions, please visit the Frequently Asked Questions section of the [employer] website. Remember, [employer] is NOT to be used for urgent needs. For medical emergencies, dial 911. Now available from your iPhone and Android! Providers Seen During Your Hospitalization Provider Specialty Primary office phone Jose Armando Álvarez MD Orthopedic Surgery [phone] Your Primary Care Physician (PCP) Primary Care Physician Office Phone Office Fax [phone] [address] 89 [phone] You are allergic to the following No active allergies Recent Documentation Height Weight BMI OB Status Smoking Status 1.676 m 65.5 kg 23.32 kg/m2 Postmenopausal Never Smoker Emergency Contacts Name Discharge Info Relation Home Work Mobile Vanessa Norman DISCHARGE CAREGIVER [3] Daughter [21] [phone] Yevgeniy Haas CAREGIVER [3] Child [2] [phone] Patient Belongings The following personal items are in your possession at time of discharge: 
  Dental Appliances: None, Uppers, Lowers, At home  Visual Aid: Glasses   Hearing Aids/Status: Does not own         Clothing:  (clothing under stretcher) Discharge Instructions Attachments/References MEFS - OXYCODONE/ACETAMINOPHEN (PERCOCET, ROXICET) - (BY MOUTH) (ENGLISH) MEFS - NARCOTIC-ANALGESIC/ACETAMINOPHEN (PERCOCET, Liliana , LORCET HD, LORTAB 10/325) - (BY MOUTH) (ENGLISH) Patient Handouts Oxycodone/Acetaminophen (Percocet, Roxicet) - (By mouth) Why this medicine is used:  
Treats pain. This medicine contains a narcotic pain reliever. Contact a nurse or doctor right away if you have: · Extreme weakness, shallow breathing, slow heartbeat · Sweating or cold, clammy skin · Skin blisters, rash, or peeling Common side effects: 
· Constipation · Nausea, vomiting · Tiredness © 2017 [address]  Information is for End User's use only and may not be sold, redistributed or otherwise used for commercial purposes. Narcotic-Analgesic/Acetaminophen (Percocet, Norco, Lorcet HD, Lortab 10/325) - (By mouth) Why this medicine is used:  
Relieves pain. Contact a nurse or doctor right away if you have: 
· Extreme weakness, shallow breathing, slow heartbeat · Severe confusion, lightheadedness, dizziness, fainting · Yellow skin or eyes, dark urine or pale stools · Severe constipation, severe stomach pain, nausea, vomiting, loss of appetite · Sweating or cold, clammy skin Common side effects: · Mild constipation, nausea, vomiting · Sleepiness, tiredness · Itching, rash © 2017 [address] Information is for End User's use only and may not be sold, redistributed or otherwise used for commercial purposes. Please provide this summary of care documentation to your next provider. Signatures-by signing, you are acknowledging that this After Visit Summary has been reviewed with you and you have received a copy. Patient Signature:  ____________________________________________________________ Date:  ____________________________________________________________  
  
Mitesh Duran Provider Signature:  ____________________________________________________________ Date:  ____________________________________________________________

## 2018-02-16 NOTE — ANESTHESIA PROCEDURE NOTES
Peripheral Block    Performed by: Cristhian Shoulder  Authorized by: Cristhian Shoulder       Block Type:     Assessment:    Injection Assessment:           Supraclavicular Nerve Block Note     right Supraclavicular nerve block:    Risks, benefits, alternatives explained at length and patient agrees to proceed. Time out performed and site for block/surgery identified. Standard monitors applied, 3 L NC O2, and sedation given as recorded by RN so as to achieve patient comfort and anxiolysis, but maintain meaningful verbal contact. Sterile prep followed by 1-2 mL local at insertion site. A 40 mm, 22G insulated stimiplex needle was inserted with ultrasound guidance. 12 ml of 0.5% ropivacaine + 12 ml 2% lidocaine with 1:200k epi injected without resistance and with gentle aspiration in 3-5 ml increments. An extremity ring block was performed with 5 ml of  2% lidocaine with 1:200k epi + 5 ml 0.5% ropivacaine. Patient tolerated procedure well. No pain, paresthesia, or blood noted. VSS throughout. No complications noted. Per the request of the surgeon for post-op pain.

## 2018-02-16 NOTE — PERIOP NOTES
All team members present, pt on O2 at 2lpm via NC and on cardia monitor, VSS.  1208 - after timeout completed, Dr. Toby Burdick performed right interscalene nerve block in preop using ultrasound machine to visualize. Pt on CM x3, 02 by NC at 2L, patient responsive when spoken to. Able to answer questions appropriately. Pt did receive Versed 1mg IV given by Dr. Toby Burdick for sedation. Pt tolerated procedure well. VSS and will continue to monitor   1235- pt awake on monitor, VSS.

## 2018-02-16 NOTE — PERIOP NOTES
Permission received to review discharge instructions and discuss private health information with all three daughters, yes

## 2018-02-20 ENCOUNTER — OFFICE VISIT (OUTPATIENT)
Dept: FAMILY MEDICINE CLINIC | Age: 83
End: 2018-02-20

## 2018-02-20 VITALS
SYSTOLIC BLOOD PRESSURE: 133 MMHG | TEMPERATURE: 98.6 F | BODY MASS INDEX: 23.95 KG/M2 | RESPIRATION RATE: 17 BRPM | DIASTOLIC BLOOD PRESSURE: 68 MMHG | HEIGHT: 66 IN | WEIGHT: 149 LBS | HEART RATE: 70 BPM | OXYGEN SATURATION: 97 %

## 2018-02-20 DIAGNOSIS — I10 ESSENTIAL HYPERTENSION: ICD-10-CM

## 2018-02-20 DIAGNOSIS — I48.20 CHRONIC ATRIAL FIBRILLATION (HCC): Primary | ICD-10-CM

## 2018-02-20 DIAGNOSIS — S62.101A CLOSED FRACTURE OF RIGHT WRIST, INITIAL ENCOUNTER: ICD-10-CM

## 2018-02-20 LAB
INR BLD: 2 (ref 1–1.5)
PT POC: 21.5 SECONDS (ref 9.1–12)
VALID INTERNAL CONTROL?: YES

## 2018-02-20 NOTE — PATIENT INSTRUCTIONS
Broken Wrist: Care Instructions  Your Care Instructions    Your wrist can break, or fracture, during sports, a fall, or other accidents. The break may happen when your wrist is hit or is used to protect you in a fall. Fractures can range from a small, hairline crack, to a bone or bones broken into two or more pieces. Your treatment depends on how bad the break is. Your doctor may have put your wrist in a cast or splint. This will help keep your wrist stable until your follow-up appointment. It may take weeks or months for your wrist to heal. You can help it heal with care at home. You heal best when you take good care of yourself. Eat a variety of healthy foods, and don't smoke. Follow-up care is a key part of your treatment and safety. Be sure to make and go to all appointments, and call your doctor if you are having problems. It's also a good idea to know your test results and keep a list of the medicines you take. How can you care for yourself at home? · Put ice or a cold pack on your wrist for 10 to 20 minutes at a time. Try to do this every 1 to 2 hours for the next 3 days (when you are awake). Put a thin cloth between the ice and your cast or splint. Keep your cast or splint dry. · Follow the splint or cast care instructions your doctor gives you. If you have a splint, do not take it off unless your doctor tells you to. Be careful not to put the splint on too tight. · Be safe with medicines. Take pain medicines exactly as directed. ¨ If the doctor gave you a prescription medicine for pain, take it as prescribed. ¨ If you are not taking a prescription pain medicine, ask your doctor if you can take an over-the-counter medicine. · Prop up your wrist on pillows when you sit or lie down in the first few days after the injury. Keep your wrist higher than the level of your heart. This will help reduce swelling.   · Move your fingers often to reduce swelling and stiffness, but do not use that hand to grab or carry anything. · Follow instructions for exercises to keep your arm strong. When should you call for help? Call your doctor now or seek immediate medical care if:  ? · You have new or worse pain. ? · Your hand or fingers are cool or pale or change color. ? · Your cast or splint feels too tight. ? · You have tingling, weakness, or numbness in your hand or fingers. ? Watch closely for changes in your health, and be sure to contact your doctor if:  ? · You do not get better as expected. ? · You have problems with your cast or splint. Where can you learn more? Go to http://jemima-louisa.info/. Enter [id number] in the search box to learn more about \"Broken Wrist: Care Instructions. \"  Current as of: March 21, 2017  Content Version: 11.4  © 8435-7740 Multichannel. Care instructions adapted under license by [employer] (which disclaims liability or warranty for this information). If you have questions about a medical condition or this instruction, always ask your healthcare professional. [address] any warranty or liability for your use of this information.

## 2018-02-20 NOTE — PROGRESS NOTES
65PP with right wrist fracture seen in ER 2/13. Then went for surgical fixation 2/16. Taking percocet q4h while she's awake. Still with significant pain. Children have been staying with her and helping with ADLs. Taking colace to encourage BMs, with some success as of yesterday. Appetite is back to normal. Sensation is returning to fingers which are still quite swollen and bruised. Family is prompting patient to move fingers throughout the day. REstarted coumadin Friday - INR today is 2. Has follow up with Dr Tevin Graham in 2 weeks. Visit Vitals    /68 (BP 1 Location: Left arm, BP Patient Position: Sitting)    Pulse 70    Temp 98.6 °F (37 °C) (Oral)    Resp 17    Ht 5' 6\" (1.676 m)    Wt 149 lb (67.6 kg)    SpO2 97%    BMI 24.05 kg/m2     Gen: alert, oriented, no acute distress  HEENT: Clear conjunctiva and clear sclera, PERRL, EOMI, moist mucous membranes. EACs and TMs bilaterally normal.  Lungs: no increased respiratory effort, clear to ausculation bilaterally  Heart: regular rate and rhythm, no murmurs, rubs or gallops  Extremities:right hand with bruising, swelling. Soft cast and sling in place    Assessment/Plan:    1. Chronic atrial fibrillation (HCC)  INR at goal  - AMB POC PT/INR    2. Closed fracture of right wrist, initial encounter  Continue moving fingers and follow up plan per ortho    3. Hypertension  At goal      Health Maintenance reviewed - updated    Medications Discontinued During This Encounter   Medication Reason    HYDROcodone-acetaminophen (NORCO) 5-325 mg per tablet Not A Current Medication    HYDROcodone-acetaminophen (NORCO) 5-325 mg per tablet Not A Current Medication       Orders Placed This Encounter    AMB POC PT/INR       Current Outpatient Prescriptions   Medication Sig Dispense Refill    oxyCODONE-acetaminophen (PERCOCET) 5-325 mg per tablet Take 1 Tab by mouth every four (4) hours as needed for Pain. Max Daily Amount: 6 Tabs.  25 Tab 0    amLODIPine (NORVASC) 5 mg tablet Take 1 Tab by mouth daily. 90 Tab 3    flecainide (TAMBOCOR) 100 mg tablet Take 1 Tab by mouth three (3) times daily. 270 Tab 1    doxazosin (CARDURA) 8 mg tablet Take 0.5 tablet twice daily. (Patient taking differently: daily. Take 0.5 tablet twice daily.) 90 Tab 3    levothyroxine (SYNTHROID) 50 mcg tablet TAKE 1 TABLET BY MOUTH EVERY MORNING BEFORE BREAKFAST FOR THYROID 90 Tab 3    hydroCHLOROthiazide (HYDRODIURIL) 25 mg tablet Take 1 Tab by mouth daily. 90 Tab 3    warfarin (COUMADIN) 5 mg tablet Take 5 mg by mouth daily. Patient is currently taking coumadin taking 5mg Mon-Fri, 7. 5 mg Sat and Sun. Indications: S, S   7.5mg. Verbal and written instructions (see AVS) provided. Patient expresses understanding of diagnosis and treatment plan.

## 2018-02-20 NOTE — MR AVS SNAPSHOT
[facility] 
 
 
 [address], [place] [id number] [facility] 
[phone] Patient: Cuhy Birch MRN:  [id number] Visit Information Date & Time Provider Department Dept. Phone Encounter #  
 2/20/2018 10:45 AM Homer Smith [facility] [phone] [id number] Your Appointments 3/30/2018  9:30 AM  
PHYSICAL PRE OP with MD Melody Smith [address] [facility] [address] ([address]) Appt Note: 6 month f/u bp $0cp [facility] [address], [address] [facility] [address] [phone]  
  
   
 [address], [place] [address] [address] Upcoming Health Maintenance Date Due  
 GLAUCOMA SCREENING Q2Y 5/27/2017 MEDICARE YEARLY EXAM 3/24/2018 DTaP/Tdap/Td series (2 - Td) 10/22/2024 Allergies as of 2/20/2018  Review Complete On: 2/20/2018 By: Erica Renteria MD  
 No Known Allergies Current Immunizations  Reviewed on 3/23/2017 Name Date Influenza High Dose Vaccine PF 9/29/2017, 9/22/2016, 10/20/2015, 10/22/2014 Influenza Vaccine 12/6/2013 Pneumococcal Conjugate (PCV-13) 3/23/2017 Pneumococcal Polysaccharide (PPSV-23) 12/6/2013 Tdap 10/22/2014 Not reviewed this visit You Were Diagnosed With   
  
 Codes Comments Chronic atrial fibrillation (HCC)    -  Primary ICD-10-CM: M47.1 ICD-9-CM: 427.31 Closed fracture of right wrist, initial encounter     ICD-10-CM: S62.101A ICD-9-CM: 814.00 Essential hypertension     ICD-10-CM: I10 
ICD-9-CM: 401.9 Vitals BP Pulse Temp Resp Height(growth percentile) Weight(growth percentile) 133/68 (BP 1 Location: Left arm, BP Patient Position: Sitting) 70 98.6 °F (37 °C) (Oral) 17 5' 6\" (1.676 m) 149 lb (67.6 kg) SpO2 BMI OB Status Smoking Status 97% 24.05 kg/m2 Postmenopausal Never Smoker BMI and BSA Data Body Mass Index Body Surface Area  24.05 kg/m 2 1.77 m 2  
  
  
 Preferred Pharmacy Pharmacy Name Phone [facility], [address] [phone] Your Updated Medication List  
  
   
This list is accurate as of: 2/20/18 11:53 AM.  Always use your most recent med list. amLODIPine 5 mg tablet Commonly known as:  Cookie Boozer Take 1 Tab by mouth daily. COUMADIN 5 mg tablet Generic drug:  warfarin Take 5 mg by mouth daily. Patient is currently taking coumadin taking 5mg Mon-Fri, 7. 5 mg Sat and Sun. Indications: S, S   7.5mg.  
  
 doxazosin 8 mg tablet Commonly known as:  CARDURA Take 0.5 tablet twice daily. flecainide 100 mg tablet Commonly known as:  TAMBOCOR Take 1 Tab by mouth three (3) times daily. hydroCHLOROthiazide 25 mg tablet Commonly known as:  HYDRODIURIL Take 1 Tab by mouth daily. levothyroxine 50 mcg tablet Commonly known as:  SYNTHROID  
TAKE 1 TABLET BY MOUTH EVERY MORNING BEFORE BREAKFAST FOR THYROID  
  
 oxyCODONE-acetaminophen 5-325 mg per tablet Commonly known as:  PERCOCET Take 1 Tab by mouth every four (4) hours as needed for Pain. Max Daily Amount: 6 Tabs. We Performed the Following AMB POC PT/INR [88560 CPT(R)] Patient Instructions Broken Wrist: Care Instructions Your Care Instructions Your wrist can break, or fracture, during sports, a fall, or other accidents. The break may happen when your wrist is hit or is used to protect you in a fall. Fractures can range from a small, hairline crack, to a bone or bones broken into two or more pieces. Your treatment depends on how bad the break is. Your doctor may have put your wrist in a cast or splint. This will help keep your wrist stable until your follow-up appointment. It may take weeks or months for your wrist to heal. You can help it heal with care at home. You heal best when you take good care of yourself. Eat a variety of healthy foods, and don't smoke. Follow-up care is a key part of your treatment and safety. Be sure to make and go to all appointments, and call your doctor if you are having problems. It's also a good idea to know your test results and keep a list of the medicines you take. How can you care for yourself at home? · Put ice or a cold pack on your wrist for 10 to 20 minutes at a time. Try to do this every 1 to 2 hours for the next 3 days (when you are awake). Put a thin cloth between the ice and your cast or splint. Keep your cast or splint dry. · Follow the splint or cast care instructions your doctor gives you. If you have a splint, do not take it off unless your doctor tells you to. Be careful not to put the splint on too tight. · Be safe with medicines. Take pain medicines exactly as directed. ¨ If the doctor gave you a prescription medicine for pain, take it as prescribed. ¨ If you are not taking a prescription pain medicine, ask your doctor if you can take an over-the-counter medicine. · Prop up your wrist on pillows when you sit or lie down in the first few days after the injury. Keep your wrist higher than the level of your heart. This will help reduce swelling. · Move your fingers often to reduce swelling and stiffness, but do not use that hand to grab or carry anything. · Follow instructions for exercises to keep your arm strong. When should you call for help? Call your doctor now or seek immediate medical care if: 
? · You have new or worse pain. ? · Your hand or fingers are cool or pale or change color. ? · Your cast or splint feels too tight. ? · You have tingling, weakness, or numbness in your hand or fingers. ? Watch closely for changes in your health, and be sure to contact your doctor if: 
? · You do not get better as expected. ? · You have problems with your cast or splint. Where can you learn more? Go to http://jemima-louisa.info/. Enter [id number] in the search box to learn more about \"Broken Wrist: Care Instructions. \" Current as of: March 21, 2017 Content Version: 11.4 © 2348-9377 Adform. Care instructions adapted under license by [employer]ze (which disclaims liability or warranty for this information). If you have questions about a medical condition or this instruction, always ask your healthcare professional. [place] [address] any warranty or liability for your use of this information. Introducing [facility]! Earle Hawkins introduces Vistaar patient portal. Now you can access parts of your medical record, email your doctor's office, and request medication refills online. 1. In your internet browser, go to [email or web address] 2. Click on the First Time User? Click Here link in the Sign In box. You will see the New Member Sign Up page. 3. Enter your Vistaar Access Code exactly as it appears below. You will not need to use this code after youve completed the sign-up process. If you do not sign up before the expiration date, you must request a new code. · Vistaar Access Code: [email or web address] Expires: 5/14/2018 10:22 PM 
 
4. Enter the last four digits of your Social Security Number (xxxx) and Date of Birth (mm/dd/yyyy) as indicated and click Submit. You will be taken to the next sign-up page. 5. Create a Vistaar ID. This will be your Vistaar login ID and cannot be changed, so think of one that is secure and easy to remember. 6. Create a Vistaar password. You can change your password at any time. 7. Enter your Password Reset Question and Answer. This can be used at a later time if you forget your password. 8. Enter your e-mail address. You will receive e-mail notification when new information is available in [address] 9. Click Sign Up. You can now view and download portions of your medical record. 10. Click the Download Summary menu link to download a portable copy of your medical information. If you have questions, please visit the Frequently Asked Questions section of the Sembraire website. Remember, Sembraire is NOT to be used for urgent needs. For medical emergencies, dial 911. Now available from your iPhone and Android! Please provide this summary of care documentation to your next provider. Your primary care clinician is listed as Ynes Mcdermott. If you have any questions after today's visit, please call [phone].

## 2018-03-30 ENCOUNTER — HOSPITAL ENCOUNTER (OUTPATIENT)
Dept: LAB | Age: 83
Discharge: HOME | End: 2018-03-30
Payer: MEDICARE

## 2018-03-30 ENCOUNTER — OFFICE VISIT (OUTPATIENT)
Dept: FAMILY MEDICINE CLINIC | Age: 83
End: 2018-03-30

## 2018-03-30 VITALS
BODY MASS INDEX: 27.38 KG/M2 | WEIGHT: 145 LBS | OXYGEN SATURATION: 96 % | HEART RATE: 72 BPM | HEIGHT: 61 IN | RESPIRATION RATE: 17 BRPM | TEMPERATURE: 98.2 F | SYSTOLIC BLOOD PRESSURE: 158 MMHG | DIASTOLIC BLOOD PRESSURE: 68 MMHG

## 2018-03-30 DIAGNOSIS — E78.5 HYPERLIPIDEMIA, UNSPECIFIED HYPERLIPIDEMIA TYPE: ICD-10-CM

## 2018-03-30 DIAGNOSIS — R09.89 WIDENED PULSE PRESSURE: ICD-10-CM

## 2018-03-30 DIAGNOSIS — I10 ESSENTIAL HYPERTENSION: ICD-10-CM

## 2018-03-30 DIAGNOSIS — E03.9 HYPOTHYROIDISM, UNSPECIFIED TYPE: ICD-10-CM

## 2018-03-30 DIAGNOSIS — Z00.00 MEDICARE ANNUAL WELLNESS VISIT, SUBSEQUENT: Primary | ICD-10-CM

## 2018-03-30 DIAGNOSIS — I27.20 PULMONARY HYPERTENSION (HCC): ICD-10-CM

## 2018-03-30 DIAGNOSIS — S62.101S CLOSED FRACTURE OF RIGHT WRIST, SEQUELA: ICD-10-CM

## 2018-03-30 DIAGNOSIS — I48.91 ATRIAL FIBRILLATION, UNSPECIFIED TYPE (HCC): ICD-10-CM

## 2018-03-30 PROBLEM — S62.101A RIGHT WRIST FRACTURE: Status: RESOLVED | Noted: 2018-02-13 | Resolved: 2018-03-30

## 2018-03-30 LAB
INR BLD: 2.1 (ref 1–1.5)
PT POC: 25.8 SECONDS (ref 9.1–12)
VALID INTERNAL CONTROL?: YES

## 2018-03-30 PROCEDURE — 80061 LIPID PANEL: CPT

## 2018-03-30 PROCEDURE — 36415 COLL VENOUS BLD VENIPUNCTURE: CPT

## 2018-03-30 PROCEDURE — 84443 ASSAY THYROID STIM HORMONE: CPT

## 2018-03-30 PROCEDURE — 85025 COMPLETE CBC W/AUTO DIFF WBC: CPT

## 2018-03-30 PROCEDURE — 80053 COMPREHEN METABOLIC PANEL: CPT

## 2018-03-30 NOTE — MR AVS SNAPSHOT
[facility] 
 
 
 [address] [place] [address] 
[phone] Patient: Annamaria Barlow MRN:  [id number] Visit Information Date & Time Provider Department Dept. Phone Encounter #  
 3/30/2018  9:30 AM Naseem Drakelucinda [facility] 717 [phone] [id number] Follow-up Instructions Return in about 1 month (around 4/30/2018). Follow-up and Disposition History Upcoming Health Maintenance Date Due  
 GLAUCOMA SCREENING Q2Y 5/27/2017 MEDICARE YEARLY EXAM 3/24/2018 DTaP/Tdap/Td series (2 - Td) 10/22/2024 Allergies as of 3/30/2018  Review Complete On: 3/30/2018 By: Flor Jung MD  
 No Known Allergies Current Immunizations  Reviewed on 3/23/2017 Name Date Influenza High Dose Vaccine PF 9/29/2017, 9/22/2016, 10/20/2015, 10/22/2014 Influenza Vaccine 12/6/2013 Pneumococcal Conjugate (PCV-13) 3/23/2017 Pneumococcal Polysaccharide (PPSV-23) 12/6/2013 Tdap 10/22/2014 Not reviewed this visit You Were Diagnosed With   
  
 Codes Comments Medicare annual wellness visit, subsequent    -  Primary ICD-10-CM: Z00.00 ICD-9-CM: V70.0 Atrial fibrillation, unspecified type ([facility]     ICD-10-CM: I48.91 
ICD-9-CM: 427.31 Essential hypertension     ICD-10-CM: I10 
ICD-9-CM: 401.9 Hypothyroidism, unspecified type     ICD-10-CM: E03.9 ICD-9-CM: 244.9 Hyperlipidemia, unspecified hyperlipidemia type     ICD-10-CM: E78.5 ICD-9-CM: 272.4 Pulmonary hypertension     ICD-10-CM: I27.20 ICD-9-CM: 416.8 Widened pulse pressure     ICD-10-CM: R09.89 ICD-9-CM: 708. 9 Closed fracture of right wrist, sequela     ICD-10-CM: S62.101S ICD-9-CM: 793. 2 Vitals BP Pulse Temp Resp Height(growth percentile) Weight(growth percentile) 158/68 (BP 1 Location: Left arm, BP Patient Position: Sitting) 72 98.2 °F (36.8 °C) (Oral) 17 5' 0.5\" (1.537 m) 145 lb (65.8 kg) SpO2 BMI OB Status Smoking Status 96% 27.85 kg/m2 Postmenopausal Never Smoker Vitals History BMI and BSA Data Body Mass Index Body Surface Area  
 27.85 kg/m 2 1.68 m 2 Preferred Pharmacy Pharmacy Name Phone [facility] [address] [phone] Your Updated Medication List  
  
   
This list is accurate as of 3/30/18 10:13 AM.  Always use your most recent med list. amLODIPine 5 mg tablet Commonly known as:  Lakia Hair Take 1 Tab by mouth daily. COUMADIN 5 mg tablet Generic drug:  warfarin Take 5 mg by mouth daily. Patient is currently taking coumadin taking 5mg Mon-Fri, 7. 5 mg Sat and Sun. Indications: S, S   7.5mg.  
  
 doxazosin 8 mg tablet Commonly known as:  CARDURA Take 0.5 tablet twice daily. flecainide 100 mg tablet Commonly known as:  TAMBOCOR Take 1 Tab by mouth three (3) times daily. hydroCHLOROthiazide 25 mg tablet Commonly known as:  HYDRODIURIL Take 1 Tab by mouth daily. levothyroxine 50 mcg tablet Commonly known as:  SYNTHROID  
TAKE 1 TABLET BY MOUTH EVERY MORNING BEFORE BREAKFAST FOR THYROID We Performed the Following AMB POC PT/INR [46372 CPT(R)] CBC WITH AUTOMATED DIFF [84827 CPT(R)] LIPID PANEL [14232 CPT(R)] METABOLIC PANEL, COMPREHENSIVE [75108 CPT(R)] TSH 3RD GENERATION [33174 CPT(R)] Follow-up Instructions Return in about 1 month (around 4/30/2018). Patient Instructions The best way to stay healthy is to live a healthy lifestyle. A healthy lifestyle includes regular exercise, eating a well-balanced diet, keeping a healthy weight and not smoking. Regular physical exams and screening tests are another important way to take care of yourself.  Preventive exams provided by health care providers can find health problems early when treatment works best and can keep you from getting certain diseases or illnesses. Preventive services include exams, lab tests, screenings, shots, monitoring and information to help you take care of your own health. All people over 65 should have a pneumonia shot. Pneumonia shots are usually only needed once in a lifetime unless your doctor decides differently. In addition to your physical exam, some screening tests are recommended: 
 
All people over 65 should have a yearly flu shot. People over 65 are at medium to high risk for Hepatitis B. Three shots are needed for complete protection. Bone mass measurement (dexa scan) is recommended every two years. Diabetes Mellitus screening is recommended every year. Glaucoma is an eye disease caused by high pressure in the eye. An eye exam is recommended every year. Cardiovascular screening tests that check your cholesterol and other blood fat (lipid) levels are recommended every five years. Colorectal Cancer screening tests help to find pre-cancerous polyps (growths in the colon) so they can be removed before they turn into cancer. Tests ordered for screening depend on your personal and family history risk factors. Prostate Cancer Screening (annually up to age 76) Screening for breast cancer is recommended yearly with a Mammogram. 
 
Screening for cervical and vaginal cancer is recommended with a pelvic and Pap test every two years. However if you have had an abnormal pap in the past  three years or at high risk for cervical or vaginal cancer Medicare will cover a pap test and a pelvic exam every year. Here is a list of your current Health Maintenance items with a due date: 
Health Maintenance Due Topic Date Due  Up to date  Introducing [facility]! Qasim Moore introduces HowAboutWe patient portal. Now you can access parts of your medical record, email your doctor's office, and request medication refills online. 1. In your internet browser, go to [email or web address] 2. Click on the First Time User? Click Here link in the Sign In box. You will see the New Member Sign Up page. 3. Enter your [employer] Access Code exactly as it appears below. You will not need to use this code after youve completed the sign-up process. If you do not sign up before the expiration date, you must request a new code. · [employer] Access Code: [email or web address] Expires: 5/14/2018 11:22 PM 
 
4. Enter the last four digits of your Social Security Number (xxxx) and Date of Birth (mm/dd/yyyy) as indicated and click Submit. You will be taken to the next sign-up page. 5. Create a Koibanxt ID. This will be your [employer] login ID and cannot be changed, so think of one that is secure and easy to remember. 6. Create a [employer] password. You can change your password at any time. 7. Enter your Password Reset Question and Answer. This can be used at a later time if you forget your password. 8. Enter your e-mail address. You will receive e-mail notification when new information is available in [address] 9. Click Sign Up. You can now view and download portions of your medical record. 10. Click the Download Summary menu link to download a portable copy of your medical information. If you have questions, please visit the Frequently Asked Questions section of the [employer] website. Remember, [employer] is NOT to be used for urgent needs. For medical emergencies, dial 911. Now available from your iPhone and Android! Please provide this summary of care documentation to your next provider. Your primary care clinician is listed as Nelly Felty. If you have any questions after today's visit, please call [phone].

## 2018-03-30 NOTE — PATIENT INSTRUCTIONS
The best way to stay healthy is to live a healthy lifestyle. A healthy lifestyle includes regular exercise, eating a well-balanced diet, keeping a healthy weight and not smoking. Regular physical exams and screening tests are another important way to take care of yourself. Preventive exams provided by health care providers can find health problems early when treatment works best and can keep you from getting certain diseases or illnesses. Preventive services include exams, lab tests, screenings, shots, monitoring and information to help you take care of your own health. All people over 65 should have a pneumonia shot. Pneumonia shots are usually only needed once in a lifetime unless your doctor decides differently. In addition to your physical exam, some screening tests are recommended:    All people over 65 should have a yearly flu shot. People over 65 are at medium to high risk for Hepatitis B. Three shots are needed for complete protection. Bone mass measurement (dexa scan) is recommended every two years. Diabetes Mellitus screening is recommended every year. Glaucoma is an eye disease caused by high pressure in the eye. An eye exam is recommended every year. Cardiovascular screening tests that check your cholesterol and other blood fat (lipid) levels are recommended every five years. Colorectal Cancer screening tests help to find pre-cancerous polyps (growths in the colon) so they can be removed before they turn into cancer. Tests ordered for screening depend on your personal and family history risk factors. Prostate Cancer Screening (annually up to age 76)    Screening for breast cancer is recommended yearly with a Mammogram.    Screening for cervical and vaginal cancer is recommended with a pelvic and Pap test every two years.  However if you have had an abnormal pap in the past  three years or at high risk for cervical or vaginal cancer Medicare will cover a pap test and a pelvic exam every year.      Here is a list of your current Health Maintenance items with a due date:  Health Maintenance Due   Topic Date Due    Up to date    

## 2018-03-30 NOTE — PROGRESS NOTES
This is a Subsequent Medicare Annual Wellness Exam (AWV) (Performed 12 months after IPPE or effective date of Medicare Part B enrollment)    I have reviewed the patient's medical history in detail and updated the computerized patient record. 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 Max Daily Amount: 6 Tabs. 25 Tab 0    amLODIPine (NORVASC) 5 mg tablet Take 1 Tab by mouth daily. 90 Tab 3    flecainide (TAMBOCOR) 100 mg tablet Take 1 Tab by mouth three (3) times daily. 270 Tab 1    doxazosin (CARDURA) 8 mg tablet Take 0.5 tablet twice daily. (Patient taking differently: daily. Take 0.5 tablet twice daily.) 90 Tab 3    levothyroxine (SYNTHROID) 50 mcg tablet TAKE 1 TABLET BY MOUTH EVERY MORNING BEFORE BREAKFAST FOR THYROID 90 Tab 3    hydroCHLOROthiazide (HYDRODIURIL) 25 mg tablet Take 1 Tab by mouth daily. 90 Tab 3    warfarin (COUMADIN) 5 mg tablet Take 5 mg by mouth daily. Patient is currently taking coumadin taking 5mg Mon-Fri, 7. 5 mg Sat and Sun. Indications: S, S   7.5mg.        No Known Allergies  Family History   Problem Relation Age of Onset    Cancer Mother      Social History   Substance Use Topics    Smoking status: Never Smoker    Smokeless tobacco: Never Used    Alcohol use No     Patient Active Problem List   Diagnosis Code    Hypertension I10    Atrial fibrillation ([facility] I48.91    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    Hypothyroid E03.9    Hyperlipidemia E78.5    Pulmonary hypertension I27.20    Widened pulse pressure R09.89       Depression Risk Factor Screening:     PHQ over the last two weeks 3/30/2018   Little interest or pleasure in doing things Not at all   Feeling down, depressed or hopeless Not at all   Total Score PHQ 2 0     Alcohol Risk Factor Screening: You do not drink alcohol or very rarely. Functional Ability and Level of Safety:   Hearing Loss  Hearing is impaired. Activities of Daily Living  The home contains: handrails  Patient does total self care    Fall Risk  Fall Risk Assessment, last 12 mths 3/30/2018   Able to walk? Yes   Fall in past 12 months? Yes   Fall with injury? Yes   Number of falls in past 12 months 1   Fall Risk Score 2     FEll after climbing a tower of cinderblocks to reach into her dog lot    Abuse Screen  Patient is not abused    Cognitive Screening   Evaluation of Cognitive Function:  Has your family/caregiver stated any concerns about your memory: no      Patient Care Team   Patient Care Team:  Cleo Coffey MD as PCP - General (Internal Medicine)  Daniel Loving MD (Cardiology)    Assessment/Plan   Education and counseling provided:  Are appropriate based on today's review and evaluation  Pneumococcal Vaccine  Influenza Vaccine  Colorectal cancer screening tests    Diagnoses and all orders for this visit:    1. Medicare annual wellness visit, subsequent      Health Maintenance Due        Up to date               HPI:  Parkerlady [place] Nol[place]oy[place]ter also presents for follow up of chronic conditions. Recently had wrist fracture with ORIF. Now back to independently doing her ADLs.   Sleepin ok and eating ok    Patient Active Problem List    Diagnosis    Widened pulse pressure    Pulmonary hypertension     1/16 - PASP=68, EF 55%      Hyperlipidemia    Hypothyroid    S/P lobectomy of lung    Atrial fibrillation ([facility]     Cardioverted by flecainide. No palpitations or light headed spells. No chest pains. Sees [facility] next monrth. No SOB, no LE edema.  Hypertension - no headaches or vision changes. See Past Medical History, Surgical History, Social History, Medications, and Allergies documented above. ROS:  ROS negative except as per HPI. PE:  Visit Vitals    /68 (BP 1 Location: Left arm, BP Patient Position: Sitting)    Pulse 72    Temp 98.2 °F (36.8 °C) (Oral)    Resp 17    Ht 5' 0.5\" (1.537 m)    Wt 145 lb (65.8 kg)    SpO2 96%    BMI 27.85 kg/m2     Gen: alert, oriented, no acute distress  Head: normocephalic, atraumatic  Ears: external auditory canals clear, TMs without erythema or effusion  Eyes: pupils equal round reactive to light, sclera clear, conjunctiva clear  Oral: moist mucus membranes, no oral lesions, no pharyngeal inflammation or exudate  Neck: symmetric normal sized thyroid, no carotid bruits, no jugular vein distention  Resp: no increase work of breathing, lungs clear to ausculation bilaterally, no wheezing, rales or rhonchi  CV: S1, S2 normal.  No murmurs, rubs, or gallops. Abd: soft, not tender, not distended. No hepatosplenomegaly. Normal bowel sounds. Neuro: cranial nerves intact, normal strength and movement in all extremities, reflexes and sensation intact and symmetric. Skin: no lesion or rash  Extremities: no LE edema. Right wrist is misshapen with limited function, well healed scar    Results for orders placed or performed in visit on 03/30/18   AMB POC PT/INR   Result Value Ref Range    VALID INTERNAL CONTROL POC Yes     Prothrombin time (POC) 25.8 (A) 9.1 - 12 seconds    INR POC 2.1 (A) 1 - 1.5       Assessment/Plan:      2. Atrial fibrillation, unspecified type (HCC)  INR at goal  - AMB POC PT/INR    3. Essential hypertension  At goal for age. Continue current medications. - CBC WITH AUTOMATED DIFF    4.  Hypothyroidism, unspecified type  No changes in medications pending lab results.  - TSH 3RD GENERATION    5. Hyperlipidemia, unspecified hyperlipidemia type  No changes in medications pending lab results. - METABOLIC PANEL, COMPREHENSIVE  - LIPID PANEL    6. Pulmonary hypertension  Stable on current medication    7. Widened pulse pressure      8. WRist fracture  Still with limited function and some pain and swelling, now doing PT. Encouraged to use tylrnol    Orders Placed This Encounter    CBC WITH AUTOMATED DIFF    METABOLIC PANEL, COMPREHENSIVE    LIPID PANEL    TSH 3RD GENERATION    AMB POC PT/INR       There are no discontinued medications. Current Outpatient Prescriptions   Medication Sig Dispense Refill    oxyCODONE-acetaminophen (PERCOCET) 5-325 mg per tablet Take 1 Tab by mouth every four (4) hours as needed for Pain. Max Daily Amount: 6 Tabs. 25 Tab 0    amLODIPine (NORVASC) 5 mg tablet Take 1 Tab by mouth daily. 90 Tab 3    flecainide (TAMBOCOR) 100 mg tablet Take 1 Tab by mouth three (3) times daily. 270 Tab 1    doxazosin (CARDURA) 8 mg tablet Take 0.5 tablet twice daily. (Patient taking differently: daily. Take 0.5 tablet twice daily.) 90 Tab 3    levothyroxine (SYNTHROID) 50 mcg tablet TAKE 1 TABLET BY MOUTH EVERY MORNING BEFORE BREAKFAST FOR THYROID 90 Tab 3    hydroCHLOROthiazide (HYDRODIURIL) 25 mg tablet Take 1 Tab by mouth daily. 90 Tab 3    warfarin (COUMADIN) 5 mg tablet Take 5 mg by mouth daily. Patient is currently taking coumadin taking 5mg Mon-Fri, 7. 5 mg Sat and Sun. Indications: S, S   7.5mg. Recommended healthy diet low in carbohydrates, fats, sodium and cholesterol. Recommended regular cardiovascular exercise 3-6 times per week for 30-60 minutes daily. Verbal and written instructions (see AVS) provided. Patient expresses understanding of diagnosis and treatment plan.

## 2018-03-31 LAB
ALBUMIN SERPL-MCNC: 4.2 G/DL (ref 3.5–4.7)
ALBUMIN/GLOB SERPL: 1.5 {RATIO} (ref 1.2–2.2)
ALP SERPL-CCNC: 79 IU/L (ref 39–117)
ALT SERPL-CCNC: 10 IU/L (ref 0–32)
AST SERPL-CCNC: 19 IU/L (ref 0–40)
BASOPHILS # BLD AUTO: 0 X10E3/UL (ref 0–0.2)
BASOPHILS NFR BLD AUTO: 0 %
BILIRUB SERPL-MCNC: 0.4 MG/DL (ref 0–1.2)
BUN SERPL-MCNC: 22 MG/DL (ref 8–27)
BUN/CREAT SERPL: 27 (ref 12–28)
CALCIUM SERPL-MCNC: 9.8 MG/DL (ref 8.7–10.3)
CHLORIDE SERPL-SCNC: 98 MMOL/L (ref 96–106)
CHOLEST SERPL-MCNC: 199 MG/DL (ref 100–199)
CO2 SERPL-SCNC: 26 MMOL/L (ref 18–29)
CREAT SERPL-MCNC: 0.83 MG/DL (ref 0.57–1)
EOSINOPHIL # BLD AUTO: 0.1 X10E3/UL (ref 0–0.4)
EOSINOPHIL NFR BLD AUTO: 1 %
ERYTHROCYTE [DISTWIDTH] IN BLOOD BY AUTOMATED COUNT: 13.9 % (ref 12.3–15.4)
GFR SERPLBLD CREATININE-BSD FMLA CKD-EPI: 64 ML/MIN/1.73
GFR SERPLBLD CREATININE-BSD FMLA CKD-EPI: 74 ML/MIN/1.73
GLOBULIN SER CALC-MCNC: 2.8 G/DL (ref 1.5–4.5)
GLUCOSE SERPL-MCNC: 94 MG/DL (ref 65–99)
HCT VFR BLD AUTO: 36.8 % (ref 34–46.6)
HDLC SERPL-MCNC: 80 MG/DL
HGB BLD-MCNC: 12.2 G/DL (ref 11.1–15.9)
IMM GRANULOCYTES # BLD: 0 X10E3/UL (ref 0–0.1)
IMM GRANULOCYTES NFR BLD: 0 %
INTERPRETATION, 910389: NORMAL
LDLC SERPL CALC-MCNC: 102 MG/DL (ref 0–99)
LYMPHOCYTES # BLD AUTO: 1.5 X10E3/UL (ref 0.7–3.1)
LYMPHOCYTES NFR BLD AUTO: 19 %
MCH RBC QN AUTO: 30.3 PG (ref 26.6–33)
MCHC RBC AUTO-ENTMCNC: 33.2 G/DL (ref 31.5–35.7)
MCV RBC AUTO: 91 FL (ref 79–97)
MONOCYTES # BLD AUTO: 0.6 X10E3/UL (ref 0.1–0.9)
MONOCYTES NFR BLD AUTO: 8 %
NEUTROPHILS # BLD AUTO: 5.6 X10E3/UL (ref 1.4–7)
NEUTROPHILS NFR BLD AUTO: 72 %
PLATELET # BLD AUTO: 277 X10E3/UL (ref 150–379)
POTASSIUM SERPL-SCNC: 4.3 MMOL/L (ref 3.5–5.2)
PROT SERPL-MCNC: 7 G/DL (ref 6–8.5)
RBC # BLD AUTO: 4.03 X10E6/UL (ref 3.77–5.28)
SODIUM SERPL-SCNC: 137 MMOL/L (ref 134–144)
TRIGL SERPL-MCNC: 84 MG/DL (ref 0–149)
TSH SERPL DL<=0.005 MIU/L-ACNC: 2.02 UIU/ML (ref 0.45–4.5)
VLDLC SERPL CALC-MCNC: 17 MG/DL (ref 5–40)
WBC # BLD AUTO: 7.8 X10E3/UL (ref 3.4–10.8)

## 2018-04-27 ENCOUNTER — CLINICAL SUPPORT (OUTPATIENT)
Dept: FAMILY MEDICINE CLINIC | Age: 83
End: 2018-04-27

## 2018-04-27 VITALS
OXYGEN SATURATION: 97 % | SYSTOLIC BLOOD PRESSURE: 149 MMHG | BODY MASS INDEX: 27.38 KG/M2 | TEMPERATURE: 97.9 F | HEART RATE: 69 BPM | WEIGHT: 145 LBS | DIASTOLIC BLOOD PRESSURE: 61 MMHG | HEIGHT: 61 IN | RESPIRATION RATE: 18 BRPM

## 2018-04-27 DIAGNOSIS — Z79.01 ANTICOAGULATION GOAL OF INR 2 TO 3: ICD-10-CM

## 2018-04-27 DIAGNOSIS — I48.91 ATRIAL FIBRILLATION, UNSPECIFIED TYPE (HCC): Primary | ICD-10-CM

## 2018-04-27 DIAGNOSIS — Z51.81 ANTICOAGULATION GOAL OF INR 2 TO 3: ICD-10-CM

## 2018-04-27 LAB
INR BLD: 1.6
PT POC: 19.7 SECONDS
VALID INTERNAL CONTROL?: YES

## 2018-04-27 NOTE — PROGRESS NOTES
Patient presents for a PT/INR  See med list and patient instructions along with results for verbal orders by Dr. Sabine Currie  Results for orders placed or performed in visit on 04/27/18   AMB POC PT/INR   Result Value Ref Range    VALID INTERNAL CONTROL POC Yes     Prothrombin time (POC) 19.7 seconds    INR POC 1.6         Vitals:    04/27/18 1025   BP: 149/61   Pulse: 69   Resp: 18   Temp: 97.9 °F (36.6 °C)   TempSrc: Oral   SpO2: 97%   Weight: 145 lb (65.8 kg)   Height: 5' 0.5\" (1.537 m)     Prior to Admission medications    Medication Sig Start Date End Date Taking? Authorizing Provider   amLODIPine (NORVASC) 5 mg tablet Take 1 Tab by mouth daily. 12/29/17  Yes Ehsan Milner MD   flecainide (TAMBOCOR) 100 mg tablet Take 1 Tab by mouth three (3) times daily. 12/18/17  Yes Ehsan Milner MD   doxazosin (CARDURA) 8 mg tablet Take 0.5 tablet twice daily. Patient taking differently: daily. Take 0.5 tablet twice daily. 10/23/17  Yes Ehsan Milner MD   levothyroxine (SYNTHROID) 50 mcg tablet TAKE 1 TABLET BY MOUTH EVERY MORNING BEFORE BREAKFAST FOR THYROID 6/20/17  Yes Ehsan Milner MD   hydroCHLOROthiazide (HYDRODIURIL) 25 mg tablet Take 1 Tab by mouth daily. 5/30/17  Yes Ehsan Milner MD   warfarin (COUMADIN) 5 mg tablet Take 5 mg by mouth daily. Patient is currently taking coumadin taking 5mg Mon-Fri, 7. 5 mg Sat and Sun. Indications: S, S   7.5mg. Yes Historical Provider     Per Dr. Dorothea Nelson verbal order change coumadin to 7.5 mg Monday, Wednesday and Friday and 5 mg the rest of the week. Come back for recheck around 5/7-8/2019  See anti-coag episode for instructions which are on AVS.    AVS and verbal instructions given she voiced understanding of all instructions.

## 2018-04-27 NOTE — MR AVS SNAPSHOT
[facility] 
 
 
 [address], [place] [address] [place] [address] [facility] 
[phone] Patient: Priscilla Zavala MRN:  [id number] Visit Information Date & Time Provider Department Dept. Phone Encounter #  
 4/27/2018 10:10 AM MD Melody Fierro [address] [address] [phone] [id number] Your Appointments 10/12/2018  9:15 AM  
ROUTINE CARE with MD Melody Fierro [address] ([facility]) Appt Note: 6 month f/u $0cp wmc [address], [address] [facility] [address]  
[phone]  
  
   
 [address], [place] [address] [address] Upcoming Health Maintenance Date Due  
 GLAUCOMA SCREENING Q2Y 5/27/2017 Influenza Age 5 to Adult 8/1/2018 MEDICARE YEARLY EXAM 3/31/2019 DTaP/Tdap/Td series (2 - Td) 10/22/2024 Allergies as of 4/27/2018  Review Complete On: 4/27/2018 By: Jeramie Blake LPN No Known Allergies Current Immunizations  Reviewed on 3/23/2017 Name Date Influenza High Dose Vaccine PF 9/29/2017, 9/22/2016, 10/20/2015, 10/22/2014 Influenza Vaccine 12/6/2013 Pneumococcal Conjugate (PCV-13) 3/23/2017 Pneumococcal Polysaccharide (PPSV-23) 12/6/2013 Tdap 10/22/2014 Not reviewed this visit You Were Diagnosed With   
  
 Codes Comments At[facility], unspecified type ([facility]    -  Primary ICD-10-CM: I48.91 
ICD-9-CM: 427.31 Anticoagulation goal of INR 2 to 3     ICD-10-CM: Z51.81, Z79.01 
ICD-9-CM: V58.83, V58.61 Vitals BP Pulse Temp Resp Height(growth percentile) Weight(growth percentile) 149/61 (BP 1 Location: Left arm, BP Patient Position: Sitting) 69 97.9 °F (36.6 °C) (Oral) 18 5' 0.5\" (1.537 m) 145 lb (65.8 kg) SpO2 BMI OB Status Smoking Status 97% 27.85 kg/m2 Postmenopausal Never Smoker BMI and BSA Data Body Mass Index Body Surface Area  
 27.85 kg/m 2 1.68 m 2 Preferred Pharmacy Pharmacy Name Phone [facility], [address] [phone] Your Updated Medication List  
  
   
This list is accurate as of 4/27/18 10:38 AM.  Always use your most recent med list. amLODIPine 5 mg tablet Commonly known as:  Unknown [place] Take 1 Tab by mouth daily. COUMADIN 5 mg tablet Generic drug:  warfarin Take 5 mg by mouth daily. Patient is currently taking coumadin taking 5mg Mon-Fri, 7. 5 mg Sat and Sun. Indications: S, S   7.5mg.  
  
 doxazosin 8 mg tablet Commonly known as:  CARDURA Take 0.5 tablet twice daily. flecainide 100 mg tablet Commonly known as:  TAMBOCOR Take 1 Tab by mouth three (3) times daily. hydroCHLOROthiazide 25 mg tablet Commonly known as:  HYDRODIURIL Take 1 Tab by mouth daily. levothyroxine 50 mcg tablet Commonly known as:  SYNTHROID  
TAKE 1 TABLET BY MOUTH EVERY MORNING BEFORE BREAKFAST FOR THYROID April 2018 Details Sun Mon Tue Wed Thu Fri Sat  
  1  
  
  
  
   2  
  
  
  
   3  
  
  
  
   4  
  
  
  
   5  
  
  
  
   6  
  
  
  
   7  
  
  
  
  
  8  
  
  
  
   9  
  
  
  
   10  
  
  
  
   11  
  
  
  
   12  
  
  
  
   13  
  
  
  
   14  
  
  
  
  
  15  
  
  
  
   16  
  
  
  
   17  
  
  
  
   18  
  
  
  
   19  
  
  
  
   20  
  
  
  
   21  
  
  
  
  
  22  
  
  
  
   23  
  
  
  
   24  
  
  
  
   25  
  
  
  
   26  
  
  
  
   27  
  
7.5 mg  
See details 28  
  
5 mg  
  
  
  29  
  
5 mg  
  
   30  
  
7.5 mg Date Details 04/27 This INR check INR: 1.6! How to take your warfarin dose To take:  5 mg Take one of the 5 mg tablets. To take:  7.5 mg Take one and a half of the 5 mg tablets. May 2018 Details Ciro Dukesmike Tue Wed Thu Fri Sat  
    1  
  
5 mg  
  
   2  
  
7.5 mg  
  
   3  
  
5 mg  
  
   4  
  
7.5 mg  
  
   5  
  
5 mg  
  
  
  6 5 mg  
  
   7  
  
7.5 mg  
  
   8  
  
  
  
   9  
  
  
  
   10  
  
  
  
   11  
  
  
  
   12  
  
  
  
  
  13  
  
  
  
   14  
  
  
  
   15  
  
  
  
   16  
  
  
  
   17  
  
  
  
   18  
  
  
  
   19  
  
  
  
  
  20  
  
  
  
   21  
  
  
  
   22  
  
  
  
   23  
  
  
  
   24  
  
  
  
   25  
  
  
  
   26  
  
  
  
  
  27  
  
  
  
   28  
  
  
  
   29  
  
  
  
   30  
  
  
  
   31  
  
  
  
    
 Date Details No additional details Date of next INR:  5/7/2018 How to take your warfarin dose To take:  5 mg Take one of the 5 mg tablets. To take:  7.5 mg Take one and a half of the 5 mg tablets. We Performed the Following AMB POC PT/INR [73136 CPT(R)] Introducing [facility]! [facility] introduces MainOne patient portal. Now you can access parts of your medical record, email your doctor's office, and request medication refills online. 1. In your internet browser, go to [email or web address] 2. Click on the First Time User? Click Here link in the Sign In box. You will see the New Member Sign Up page. 3. Enter your MainOne Access Code exactly as it appears below. You will not need to use this code after youve completed the sign-up process. If you do not sign up before the expiration date, you must request a new code. · MainOne Access Code: [email or web address] Expires: 5/14/2018 11:22 PM 
 
4. Enter the last four digits of your Social Security Number (xxxx) and Date of Birth (mm/dd/yyyy) as indicated and click Submit. You will be taken to the next sign-up page. 5. Create a MainOne ID. This will be your MainOne login ID and cannot be changed, so think of one that is secure and easy to remember. 6. Create a MainOne password. You can change your password at any time. 7. Enter your Password Reset Question and Answer. This can be used at a later time if you forget your password. 8. Enter your e-mail address. You will receive e-mail notification when new information is available in [address] 9. Click Sign Up. You can now view and download portions of your medical record. 10. Click the Download Summary menu link to download a portable copy of your medical information. If you have questions, please visit the Frequently Asked Questions section of the [employer] website. Remember, [employer]uo is NOT to be used for urgent needs. For medical emergencies, dial 911. Now available from your iPhone and Android! Please provide this summary of care documentation to your next provider. Your primary care clinician is listed as Edna Bloch. If you have any questions after today's visit, please call [phone].

## 2018-05-08 ENCOUNTER — CLINICAL SUPPORT (OUTPATIENT)
Dept: FAMILY MEDICINE CLINIC | Age: 83
End: 2018-05-08

## 2018-05-08 VITALS
DIASTOLIC BLOOD PRESSURE: 55 MMHG | BODY MASS INDEX: 27.38 KG/M2 | TEMPERATURE: 98.1 F | SYSTOLIC BLOOD PRESSURE: 155 MMHG | HEIGHT: 61 IN | HEART RATE: 72 BPM | OXYGEN SATURATION: 97 % | WEIGHT: 145 LBS | RESPIRATION RATE: 20 BRPM

## 2018-05-08 DIAGNOSIS — I27.20 PULMONARY HYPERTENSION (HCC): ICD-10-CM

## 2018-05-08 DIAGNOSIS — Z79.01 ANTICOAGULATION GOAL OF INR 2 TO 3: ICD-10-CM

## 2018-05-08 DIAGNOSIS — I48.91 ATRIAL FIBRILLATION, UNSPECIFIED TYPE (HCC): Primary | ICD-10-CM

## 2018-05-08 DIAGNOSIS — Z51.81 ANTICOAGULATION GOAL OF INR 2 TO 3: ICD-10-CM

## 2018-05-08 LAB
INR BLD: 1.9
PT POC: 23 SECONDS
VALID INTERNAL CONTROL?: YES

## 2018-05-08 NOTE — PROGRESS NOTES
Patient presents for a PT/INR  See med list and patient instructions along with results for verbal orders by Dr. Geovanna Hall. Results for orders placed or performed in visit on 05/08/18   AMB POC PT/INR   Result Value Ref Range    VALID INTERNAL CONTROL POC Yes     Prothrombin time (POC) 23.0 seconds    INR POC 1.9         There were no vitals filed for this visit. Prior to Admission medications    Medication Sig Start Date End Date Taking? Authorizing Provider   amLODIPine (NORVASC) 5 mg tablet Take 1 Tab by mouth daily. 12/29/17  Yes Farzana Bauer MD   flecainide (TAMBOCOR) 100 mg tablet Take 1 Tab by mouth three (3) times daily. 12/18/17  Yes Farzana Bauer MD   doxazosin (CARDURA) 8 mg tablet Take 0.5 tablet twice daily. Patient taking differently: daily. Take 0.5 tablet twice daily. 10/23/17  Yes Farzana Bauer MD   levothyroxine (SYNTHROID) 50 mcg tablet TAKE 1 TABLET BY MOUTH EVERY MORNING BEFORE BREAKFAST FOR THYROID 6/20/17  Yes Farzana Bauer MD   hydroCHLOROthiazide (HYDRODIURIL) 25 mg tablet Take 1 Tab by mouth daily. 5/30/17  Yes Farzana Bauer MD   warfarin (COUMADIN) 5 mg tablet Take 5 mg by mouth daily. Patient is currently taking coumadin taking 5mg Mon-Fri, 7. 5 mg Sat and Sun. Indications: S, S   7.5mg. Yes Historical Provider   per Dr. Tayo Carlos verbal order continue coumadin 5 mg tablet. Take 7.5 mg Monday, Wednesday and Friday, and 5 mg the rest of the week. See anti-coag episode for instructions which are on AVS.    AVS and verbal instructions given she voiced understanding of all instructions.

## 2018-05-08 NOTE — MR AVS SNAPSHOT
[address] [facility] 
 
 
 [address], [place] [address] [place] [address] [facility] 
[phone] Patient: Rylie Roman MRN:  [id number] Visit Information Date & Time Provider Department Dept. Phone Encounter #  
 5/8/2018 10:10 AM Homer Garcia [facility] [phone] [id number] Your Appointments 10/12/2018  9:15 AM  
ROUTINE CARE with MD Ivon Garcia. [address] ([facility]) Appt Note: 6 month f/u $0cp wmc [address], [address] [facility] [address]  
[phone]  
  
   
 [address], [place] [address] [address] Upcoming Health Maintenance Date Due  
 GLAUCOMA SCREENING Q2Y 5/27/2017 Influenza Age 5 to Adult 8/1/2018 MEDICARE YEARLY EXAM 3/31/2019 DTaP/Tdap/Td series (2 - Td) 10/22/2024 Allergies as of 5/8/2018  Review Complete On: 5/8/2018 By: Warren Rasmussen LPN No Known Allergies Current Immunizations  Reviewed on 3/23/2017 Name Date Influenza High Dose Vaccine PF 9/29/2017, 9/22/2016, 10/20/2015, 10/22/2014 Influenza Vaccine 12/6/2013 Pneumococcal Conjugate (PCV-13) 3/23/2017 Pneumococcal Polysaccharide (PPSV-23) 12/6/2013 Tdap 10/22/2014 Not reviewed this visit You Were Diagnosed With   
  
 Codes Comments At[facility], unspecified type [facility]    -  Primary ICD-10-CM: I48.91 
ICD-9-CM: 427.31 Pulmonary hypertension (HCC)     ICD-10-CM: I27.20 ICD-9-CM: 416.8 Anticoagulation goal of INR 2 to 3     ICD-10-CM: Z51.81, Z79.01 
ICD-9-CM: V58.83, V58.61 Vitals OB Status Smoking Status Postmenopausal Never Smoker Preferred Pharmacy Pharmacy Name Phone [facility], [address] [phone] Your Updated Medication List  
  
   
This list is accurate as of 5/8/18 10:41 AM.  Always use your most recent med list.  
  
  
 amLODIPine 5 mg tablet Commonly known as:  Remonia Grates Take 1 Tab by mouth daily. COUMADIN 5 mg tablet Generic drug:  warfarin Take 5 mg by mouth daily. Patient is currently taking coumadin taking 5mg Mon-Fri, 7. 5 mg Sat and Sun. Indications: S, S   7.5mg.  
  
 doxazosin 8 mg tablet Commonly known as:  CARDURA Take 0.5 tablet twice daily. flecainide 100 mg tablet Commonly known as:  TAMBOCOR Take 1 Tab by mouth three (3) times daily. hydroCHLOROthiazide 25 mg tablet Commonly known as:  HYDRODIURIL Take 1 Tab by mouth daily. levothyroxine 50 mcg tablet Commonly known as:  SYNTHROID  
TAKE 1 TABLET BY MOUTH EVERY MORNING BEFORE BREAKFAST FOR THYROID May 2018 Details Sun Mon Tue Wed Thu Fri Sat  
    1  
  
  
  
   2  
  
  
  
   3  
  
  
  
   4  
  
  
  
   5  
  
  
  
  
  6  
  
  
  
   7  
  
  
  
   8  
  
5 mg See details 9  
  
7.5 mg  
  
   10  
  
5 mg  
  
   11  
  
7.5 mg  
  
   12  
  
5 mg  
  
  
  13  
  
5 mg  
  
   14  
  
7.5 mg  
  
   15  
  
5 mg  
  
   16  
  
7.5 mg  
  
   17  
  
5 mg  
  
   18  
  
7.5 mg  
  
   19  
  
5 mg  
  
  
  20  
  
5 mg  
  
   21  
  
7.5 mg  
  
   22  
  
5 mg  
  
   23  
  
  
  
   24  
  
  
  
   25  
  
  
  
   26  
  
  
  
  
  27  
  
  
  
   28  
  
  
  
   29  
  
  
  
   30  
  
  
  
   31  
  
  
  
    
 Date Details 05/08 This INR check INR: 1.9! Date of next INR:  5/22/2018 How to take your warfarin dose To take:  5 mg Take one of the 5 mg tablets. To take:  7.5 mg Take one and a half of the 5 mg tablets. We Performed the Following AMB POC PT/INR [96711 CPT(R)] Introducing [address]! [address] [facility] introduces [employer] patient portal. Now you can access parts of your medical record, email your doctor's office, and request medication refills online.    
 
1. In your internet browser, go to [email or web address] 2. Click on the First Time User? Click Here link in the Sign In box. You will see the New Member Sign Up page. 3. Enter your Spool Access Code exactly as it appears below. You will not need to use this code after youve completed the sign-up process. If you do not sign up before the expiration date, you must request a new code. · Spool Access Code: [email or web address] Expires: 5/14/2018 11:22 PM 
 
4. Enter the last four digits of your Social Security Number (xxxx) and Date of Birth (mm/dd/yyyy) as indicated and click Submit. You will be taken to the next sign-up page. 5. Create a OrderBordert ID. This will be your Spool login ID and cannot be changed, so think of one that is secure and easy to remember. 6. Create a Spool password. You can change your password at any time. 7. Enter your Password Reset Question and Answer. This can be used at a later time if you forget your password. 8. Enter your e-mail address. You will receive e-mail notification when new information is available in [address] 9. Click Sign Up. You can now view and download portions of your medical record. 10. Click the Download Summary menu link to download a portable copy of your medical information. If you have questions, please visit the Frequently Asked Questions section of the Spool website. Remember, Spool is NOT to be used for urgent needs. For medical emergencies, dial 911. Now available from your iPhone and Android! Please provide this summary of care documentation to your next provider. Your primary care clinician is listed as Janis Samuel. If you have any questions after today's visit, please call [phone].

## 2018-05-22 ENCOUNTER — CLINICAL SUPPORT (OUTPATIENT)
Dept: FAMILY MEDICINE CLINIC | Age: 83
End: 2018-05-22

## 2018-05-22 VITALS
DIASTOLIC BLOOD PRESSURE: 52 MMHG | SYSTOLIC BLOOD PRESSURE: 134 MMHG | OXYGEN SATURATION: 96 % | HEIGHT: 60 IN | WEIGHT: 149.2 LBS | BODY MASS INDEX: 29.29 KG/M2 | HEART RATE: 75 BPM | TEMPERATURE: 97.9 F | RESPIRATION RATE: 18 BRPM

## 2018-05-22 DIAGNOSIS — I48.91 ATRIAL FIBRILLATION, UNSPECIFIED TYPE (HCC): Primary | ICD-10-CM

## 2018-05-22 LAB
INR BLD: 2
PT POC: 24 SECONDS
VALID INTERNAL CONTROL?: YES

## 2018-05-22 NOTE — MR AVS SNAPSHOT
303 [facility] 
 
 
 [address], [place] [address] [place], [place] [address] [facility] 
[phone] Patient: Rylie Roman MRN:  [id number] Visit Information Date & Time Provider Department Dept. Phone Encounter #  
 5/22/2018 10:10 AM Homer Edmondson [place] [address] [phone] [id number] Your Appointments 10/12/2018  9:15 AM  
ROUTINE CARE with MD Ivon Garcia. Tj 57 [address] 205 ([address] [facility]) Appt Note: 6 month f/u $0cp wmc [address], [address] [facility] [address]  
[phone]  
  
   
 [address], [place] [address] [address] Upcoming Health Maintenance Date Due  
 GLAUCOMA SCREENING Q2Y 5/27/2017 Influenza Age 5 to Adult 8/1/2018 MEDICARE YEARLY EXAM 3/31/2019 DTaP/Tdap/Td series (2 - Td) 10/22/2024 Allergies as of 5/22/2018  Review Complete On: 5/22/2018 By: Maicol Mcadams LPN No Known Allergies Current Immunizations  Reviewed on 3/23/2017 Name Date Influenza High Dose Vaccine PF 9/29/2017, 9/22/2016, 10/20/2015, 10/22/2014 Influenza Vaccine 12/6/2013 Pneumococcal Conjugate (PCV-13) 3/23/2017 Pneumococcal Polysaccharide (PPSV-23) 12/6/2013 Tdap 10/22/2014 Not reviewed this visit You Were Diagnosed With   
  
 Codes Comments Atrial fibrillation, unspecified type ([facility]    -  Primary ICD-10-CM: I48.91 
ICD-9-CM: 427.31 Vitals BP Pulse Temp Resp Height(growth percentile) Weight(growth percentile) 134/52 (BP 1 Location: Left arm, BP Patient Position: Sitting) 75 97.9 °F (36.6 °C) (Oral) 18 5' (1.524 m) 149 lb 3.2 oz (67.7 kg) SpO2 BMI OB Status Smoking Status 96% 29.14 kg/m2 Postmenopausal Never Smoker Vitals History BMI and BSA Data Body Mass Index Body Surface Area  
 29.14 kg/m 2 1.69 m 2 Preferred Pharmacy Pharmacy Name Phone [facility] [address], [address] [phone] Your Updated Medication List  
  
   
This list is accurate as of 5/22/18 10:17 AM.  Always use your most recent med list. amLODIPine 5 mg tablet Commonly known as:  Mushtaq Elliott Take 1 Tab by mouth daily. COUMADIN 5 mg tablet Generic drug:  warfarin Take 5 mg by mouth daily. Patient is currently taking coumadin taking 5mg Mon-Fri, 7. 5 mg Sat and Sun. Indications: S, S   7.5mg.  
  
 doxazosin 8 mg tablet Commonly known as:  CARDURA Take 0.5 tablet twice daily. flecainide 100 mg tablet Commonly known as:  TAMBOCOR Take 1 Tab by mouth three (3) times daily. hydroCHLOROthiazide 25 mg tablet Commonly known as:  HYDRODIURIL Take 1 Tab by mouth daily. levothyroxine 50 mcg tablet Commonly known as:  SYNTHROID  
TAKE 1 TABLET BY MOUTH EVERY MORNING BEFORE BREAKFAST FOR THYROID Description Per Brandon Miller NP. Continue current dosage of coumadin. Return in 1 month for re-check. May 2018 Details Sun Mon Tue Wed Thu Fri Sat  
    1  
  
  
  
   2  
  
  
  
   3  
  
  
  
   4  
  
  
  
   5  
  
  
  
  
  6  
  
  
  
   7  
  
  
  
   8  
  
  
  
   9  
  
  
  
   10  
  
  
  
   11  
  
  
  
   12  
  
  
  
  
  13  
  
  
  
   14  
  
  
  
   15  
  
  
  
   16  
  
  
  
   17  
  
  
  
   18  
  
  
  
   19  
  
  
  
  
  20  
  
  
  
   21  
  
  
  
   22  
  
5 mg See details 23  
  
7.5 mg  
  
   24  
  
5 mg  
  
   25  
  
7.5 mg  
  
   26  
  
5 mg  
  
  
  27  
  
5 mg  
  
   28  
  
7.5 mg  
  
   29  
  
5 mg  
  
   30  
  
7.5 mg  
  
   31  
  
5 mg Date Details 05/22 This INR check INR: 2.0 How to take your warfarin dose To take:  5 mg Take one of the 5 mg tablets. To take:  7.5 mg Take one and a half of the 5 mg tablets. June 2018 Details Castro Brantleyyne Tue Wed Thu Fri Sat  
       1  
  
7.5 mg  
  
   2  
  
5 mg  
  
  
  3  
  
5 mg  
  
   4  
  
7.5 mg  
  
   5  
  
5 mg  
  
   6  
  
7.5 mg  
  
   7  
  
5 mg  
  
   8  
  
7.5 mg  
  
   9  
  
5 mg  
  
  
  10  
  
5 mg  
  
   11  
  
7.5 mg  
  
   12  
  
5 mg  
  
   13  
  
7.5 mg  
  
   14  
  
5 mg  
  
   15  
  
7.5 mg  
  
   16  
  
5 mg  
  
  
  17  
  
5 mg  
  
   18  
  
7.5 mg  
  
   19  
  
5 mg  
  
   20  
  
7.5 mg  
  
   21  
  
5 mg  
  
   22  
  
7.5 mg  
  
   23  
  
  
  
  
  24  
  
  
  
   25  
  
  
  
   26  
  
  
  
   27  
  
  
  
   28  
  
  
  
   29  
  
  
  
   30  
  
  
  
  
 Date Details No additional details Date of next INR:  6/22/2018 How to take your warfarin dose To take:  5 mg Take one of the 5 mg tablets. To take:  7.5 mg Take one and a half of the 5 mg tablets. We Performed the Following AMB POC PT/INR [55064 CPT(R)] Introducing [facility] [place] [facility] SERVICES! Marium Carrion introduces [employer] patient portal. Now you can access parts of your medical record, email your doctor's office, and request medication refills online. 1. In your internet browser, go to [email or web address] 2. Click on the First Time User? Click Here link in the Sign In box. You will see the New Member Sign Up page. 3. Enter your [employer] Access Code exactly as it appears below. You will not need to use this code after youve completed the sign-up process. If you do not sign up before the expiration date, you must request a new code. · [employer] Access Code: [email or web address] Expires: 8/20/2018 10:16 AM 
 
4. Enter the last four digits of your Social Security Number (xxxx) and Date of Birth (mm/dd/yyyy) as indicated and click Submit. You will be taken to the next sign-up page. 5. Create a 37[employer] ID. This will be your [employer] login ID and cannot be changed, so think of one that is secure and easy to remember. 6. Create a Treventis password. You can change your password at any time. 7. Enter your Password Reset Question and Answer. This can be used at a later time if you forget your password. 8. Enter your e-mail address. You will receive e-mail notification when new information is available in [address] 9. Click Sign Up. You can now view and download portions of your medical record. 10. Click the Download Summary menu link to download a portable copy of your medical information. If you have questions, please visit the Frequently Asked Questions section of the Tre[employer] website. Remember, Treventis is NOT to be used for urgent needs. For medical emergencies, dial 911. Now available from your iPhone and Android! Please provide this summary of care documentation to your next provider. Your primary care clinician is listed as Maura Birch. If you have any questions after today's visit, please call [phone].

## 2018-05-22 NOTE — PROGRESS NOTES
Chief Complaint   Patient presents with    Anticoagulation     1. Have you been to the ER, urgent care clinic since your last visit? Hospitalized since your last visit? No    2. Have you seen or consulted any other health care providers outside of the [facility] since your last visit? Include any pap smears or colon screening. No     Pt takes 7.5 mg M, W, F, all other days 5mg. Milan Green is a 80 y.o. female who presents today for Anticoagulation monitoring. Patient states current dose coumadin is:   Missed Coumadin Doses:  None  Medication Changes:  no  Dietary Changes:  no    Symptoms: without any bleeding. Anticoagulation Summary as of 5/22/2018     INR goal 2.0-3.0    Today's INR 2.0    Warfarin maintenance plan 7.5 mg (5 mg x 1.5) on Mon, Wed, Fri; 5 mg (5 mg x 1) all other days    Weekly warfarin total 42.5 mg    Plan last modified Manish Norwood LPN (7/47/4283)    Next INR check 6/22/2018    Target end date       Anticoagulation Episode Summary     INR check location Clinic Lab    Preferred lab [address]  INR reminders to     Comments           Description          Per Nancy Shannon NP. Continue current dosage of coumadin. Return in 1 month for re-check.

## 2018-06-12 DIAGNOSIS — J40 BRONCHITIS: ICD-10-CM

## 2018-06-13 RX ORDER — HYDROCHLOROTHIAZIDE 25 MG/1
TABLET ORAL
Qty: 90 TAB | Refills: 3 | Status: SHIPPED | OUTPATIENT
Start: 2018-06-13 | End: 2019-07-08 | Stop reason: SDUPTHER

## 2018-06-13 RX ORDER — LEVOTHYROXINE SODIUM 50 UG/1
TABLET ORAL
Qty: 90 TAB | Refills: 3 | Status: SHIPPED | OUTPATIENT
Start: 2018-06-13 | End: 2019-06-13 | Stop reason: SDUPTHER

## 2018-06-19 ENCOUNTER — CLINICAL SUPPORT (OUTPATIENT)
Dept: FAMILY MEDICINE CLINIC | Age: 83
End: 2018-06-19

## 2018-06-19 VITALS
TEMPERATURE: 98.1 F | SYSTOLIC BLOOD PRESSURE: 125 MMHG | BODY MASS INDEX: 29.25 KG/M2 | WEIGHT: 149 LBS | OXYGEN SATURATION: 95 % | RESPIRATION RATE: 20 BRPM | HEIGHT: 60 IN | HEART RATE: 83 BPM | DIASTOLIC BLOOD PRESSURE: 56 MMHG

## 2018-06-19 DIAGNOSIS — I48.91 ATRIAL FIBRILLATION, UNSPECIFIED TYPE (HCC): Primary | ICD-10-CM

## 2018-06-19 LAB
INR BLD: 2
PT POC: 24.4 SECONDS
VALID INTERNAL CONTROL?: YES

## 2018-06-19 NOTE — PROGRESS NOTES
Chief Complaint   Patient presents with    Anticoagulation     ptinr     Pt is currently taking 7.5mg on M,W, Fri, 5mg all other days. Tim Sow is a 80 y.o. female who presents today for Anticoagulation monitoring. Patient states current dose coumadin is:   Missed Coumadin Doses:  None  Medication Changes:  no  Dietary Changes:  no    Symptoms: without any bleeding. Anticoagulation Summary as of 6/19/2018     INR goal 2.0-3.0    Today's INR 2.0    Warfarin maintenance plan 7.5 mg (5 mg x 1.5) on Mon, Wed, Fri; 5 mg (5 mg x 1) all other days    Weekly warfarin total 42.5 mg    Plan last modified Kiran Hunter LPN (9/75/4333)    Next INR check 7/19/2018    Target end date       Anticoagulation Episode Summary     INR check location Clinic Lab    Preferred lab [address] INR reminders to     Comments           Description          Per Monico Orozco NP. Continue current dosage of coumadin. Return in 1 month for re-check. Verbal and written instructions given to pt, pt verbalized understanding. No questions at this time.

## 2018-06-25 RX ORDER — WARFARIN SODIUM 5 MG/1
TABLET ORAL
Qty: 180 TAB | Refills: 3 | Status: SHIPPED | OUTPATIENT
Start: 2018-06-25 | End: 2019-08-01 | Stop reason: SDUPTHER

## 2018-07-17 ENCOUNTER — CLINICAL SUPPORT (OUTPATIENT)
Dept: FAMILY MEDICINE CLINIC | Age: 83
End: 2018-07-17

## 2018-07-17 VITALS
RESPIRATION RATE: 18 BRPM | HEIGHT: 60 IN | BODY MASS INDEX: 28.9 KG/M2 | SYSTOLIC BLOOD PRESSURE: 125 MMHG | OXYGEN SATURATION: 95 % | DIASTOLIC BLOOD PRESSURE: 60 MMHG | HEART RATE: 71 BPM | TEMPERATURE: 98.2 F | WEIGHT: 147.2 LBS

## 2018-07-17 DIAGNOSIS — I48.91 ATRIAL FIBRILLATION, UNSPECIFIED TYPE (HCC): Primary | ICD-10-CM

## 2018-07-17 LAB
INR BLD: 2.5
PT POC: 29.4 SECONDS
VALID INTERNAL CONTROL?: YES

## 2018-07-17 NOTE — PROGRESS NOTES
Chief Complaint   Patient presents with    Anticoagulation     pt/inr     Pt is currently takin and 1/2 5 mg tablet Mon, Wed, and Friday, all other days pt take 1 5mg tablets. Desirae Richard is a 80 y.o. female who presents today for Anticoagulation monitoring. Patient states current dose coumadin is:   Missed Coumadin Doses:  None  Medication Changes:  no  Dietary Changes:  no    Symptoms: without any bleeding. Anticoagulation Summary as of 2018     INR goal 2.0-3.0    Today's INR 2.5    Warfarin maintenance plan 7.5 mg (5 mg x 1.5) on Mon, Wed, Fri; 5 mg (5 mg x 1) all other days    Weekly warfarin total 42.5 mg    Plan last modified Guero Cueto LPN ()    Next INR check 2018    Target end date       Anticoagulation Episode Summary     INR check location Clinic Lab    Preferred lab  [address] INR reminders to     Comments           Description          Per Kenyetta Holt NP. Continue current dosage of coumadin. Return in 1 month for re-check.

## 2018-07-17 NOTE — MR AVS SNAPSHOT
303 [facility] 
 
 
 [address], [place] [address] 1364 [facility] 
[phone] Patient: Adina Loja MRN:  [id number] Visit Information Date & Time Provider Department Dept. Phone Encounter #  
 7/17/2018 10:10 AM Lisa Elena, 5301 [facility] 930 [phone] [id number] Your Appointments 7/17/2018 10:10 AM  
Nurse Visit with YURY Julio 57 [address] ([address]) Appt Note: coumadin [address], [address] [facility] [address]  
915 [facility] [address]  
  
    
 10/12/2018  9:15 AM  
ROUTINE CARE with MD Melody Fay 57 [address] ([address]) Appt Note: 6 month f/u $0cp wmc [address], [address] [facility] [address]  
[phone]  
  
   
 [address], [place] [address] [address] Upcoming Health Maintenance Date Due  
 GLAUCOMA SCREENING Q2Y 5/27/2017 Influenza Age 5 to Adult 8/1/2018 MEDICARE YEARLY EXAM 3/31/2019 DTaP/Tdap/Td series (2 - Td) 10/22/2024 Allergies as of 7/17/2018  Review Complete On: 7/17/2018 By: Cande Diamond LPN No Known Allergies Current Immunizations  Reviewed on 3/23/2017 Name Date Influenza High Dose Vaccine PF 9/29/2017, 9/22/2016, 10/20/2015, 10/22/2014 Influenza Vaccine 12/6/2013 Pneumococcal Conjugate (PCV-13) 3/23/2017 Pneumococcal Polysaccharide (PPSV-23) 12/6/2013 Tdap 10/22/2014 Not reviewed this visit You Were Diagnosed With   
  
 Codes Comments Atrial fibrillation, unspecified type ([facility]    -  Primary ICD-10-CM: I48.91 
ICD-9-CM: 427.31 Vitals BP Pulse Temp Resp Height(growth percentile) Weight(growth percentile) 125/60 (BP 1 Location: Left arm, BP Patient Position: Sitting) 71 98.2 °F (36.8 °C) (Oral) 18 5' (1.524 m) 147 lb 3.2 oz (66.8 kg) SpO2 BMI OB Status Smoking Status 95% 28.75 kg/m2 Postmenopausal Never Smoker Vitals History BMI and BSA Data Body Mass Index Body Surface Area 28.75 kg/m 2 1.68 m 2 Preferred Pharmacy Pharmacy Name Phone [facility] [address], [address] [phone] Your Updated Medication List  
  
   
This list is accurate as of 7/17/18 10:07 AM.  Always use your most recent med list. amLODIPine 5 mg tablet Commonly known as:  Russellseveriano Gonzalez Take 1 Tab by mouth daily. * COUMADIN 5 mg tablet Generic drug:  warfarin Take 5 mg by mouth daily. Patient is currently taking coumadin taking 5mg Mon-Fri, 7. 5 mg Sat and Sun. Indications: S, S   7.5mg.  
  
 * warfarin 5 mg tablet Commonly known as:  COUMADIN  
TAKE 1 & 1/2 TABLETS BY MOUTH EVERY WEDNESDAY & SUNDAY & TAKE 1 TABLET BY MOUTH ALL OTHER DAYS FOR CLOTS  
  
 doxazosin 8 mg tablet Commonly known as:  CARDURA Take 0.5 tablet twice daily. flecainide 100 mg tablet Commonly known as:  TAMBOCOR Take 1 Tab by mouth three (3) times daily. hydroCHLOROthiazide 25 mg tablet Commonly known as:  HYDRODIURIL  
TAKE 1 TABLET BY MOUTH DAILY. levothyroxine 50 mcg tablet Commonly known as:  SYNTHROID  
TAKE 1 TABLET BY MOUTH EVERY MORNING BEFORE BREAKFAST FOR THYROID * Notice: This list has 2 medication(s) that are the same as other medications prescribed for you. Read the directions carefully, and ask your doctor or other care provider to review them with you. Description Per Jean Carlos Haskins NP. Continue current dosage of coumadin. Return in 1 month for re-check. July 2018 Details Sun Mon Tue Wed Thu Fri Sat  
  1  
  
  
  
   2  
  
  
  
   3  
  
  
  
   4  
  
  
  
   5  
  
  
  
   6  
  
  
  
   7  
  
  
  
  
  8  
  
  
  
   9  
  
  
  
   10  
  
  
  
   11  
  
  
  
   12  
  
  
  
   13 14  
  
  
  
  
  15  
  
  
  
   16  
  
  
  
   17  
  
5 mg See details 18  
  
7.5 mg  
  
   19  
  
5 mg  
  
   20  
  
7.5 mg  
  
   21  
  
5 mg  
  
  
  22  
  
5 mg  
  
   23  
  
7.5 mg  
  
   24  
  
5 mg  
  
   25  
  
7.5 mg  
  
   26  
  
5 mg  
  
   27  
  
7.5 mg  
  
   28  
  
5 mg  
  
  
  29  
  
5 mg  
  
   30  
  
7.5 mg  
  
   31  
  
5 mg Date Details 07/17 This INR check INR: 2.5 How to take your warfarin dose To take:  5 mg Take one of the 5 mg tablets. To take:  7.5 mg Take one and a half of the 5 mg tablets. August 2018 Details Jeanne Carbajal Tue Wed Thu Fri Sat  
     1  
  
7.5 mg  
  
   2  
  
5 mg  
  
   3  
  
7.5 mg  
  
   4  
  
5 mg  
  
  
  5  
  
5 mg  
  
   6  
  
7.5 mg  
  
   7  
  
5 mg  
  
   8  
  
7.5 mg  
  
   9  
  
5 mg  
  
   10  
  
7.5 mg  
  
   11  
  
5 mg  
  
  
  12  
  
5 mg  
  
   13  
  
7.5 mg  
  
   14  
  
5 mg  
  
   15  
  
7.5 mg  
  
   16  
  
5 mg  
  
   17  
  
7.5 mg  
  
   18  
  
  
  
  
  19  
  
  
  
   20  
  
  
  
   21  
  
  
  
   22  
  
  
  
   23  
  
  
  
   24  
  
  
  
   25  
  
  
  
  
  26  
  
  
  
   27  
  
  
  
   28  
  
  
  
   29  
  
  
  
   30  
  
  
  
   31  
  
  
  
   
 Date Details No additional details Date of next INR:  8/17/2018 How to take your warfarin dose To take:  5 mg Take one of the 5 mg tablets. To take:  7.5 mg Take one and a half of the 5 mg tablets. We Performed the Following AMB POC PT/INR [78316 CPT(R)] Introducing [facility] & HEALTH SERVICES! Valentine Garza introduces Golden Reviews patient portal. Now you can access parts of your medical record, email your doctor's office, and request medication refills online. 1. In your internet browser, go to [email or web address] 2. Click on the First Time User? Click Here link in the Sign In box.  You will see the New Member Sign Up page. 3. Enter your Plickers Access Code exactly as it appears below. You will not need to use this code after youve completed the sign-up process. If you do not sign up before the expiration date, you must request a new code. · Plickers Access Code: [email or web address] Expires: 8/20/2018 10:16 AM 
 
4. Enter the last four digits of your Social Security Number (xxxx) and Date of Birth (mm/dd/yyyy) as indicated and click Submit. You will be taken to the next sign-up page. 5. Create a Plickers ID. This will be your Plickers login ID and cannot be changed, so think of one that is secure and easy to remember. 6. Create a Plickers password. You can change your password at any time. 7. Enter your Password Reset Question and Answer. This can be used at a later time if you forget your password. 8. Enter your e-mail address. You will receive e-mail notification when new information is available in [address] 9. Click Sign Up. You can now view and download portions of your medical record. 10. Click the Download Summary menu link to download a portable copy of your medical information. If you have questions, please visit the Frequently Asked Questions section of the Plickers website. Remember, Plickers is NOT to be used for urgent needs. For medical emergencies, dial 911. Now available from your iPhone and Android! Please provide this summary of care documentation to your next provider. Your primary care clinician is listed as Daniel Wang. If you have any questions after today's visit, please call [phone].

## 2018-08-14 ENCOUNTER — CLINICAL SUPPORT (OUTPATIENT)
Dept: FAMILY MEDICINE CLINIC | Age: 83
End: 2018-08-14

## 2018-08-14 VITALS
HEART RATE: 72 BPM | RESPIRATION RATE: 16 BRPM | WEIGHT: 150 LBS | BODY MASS INDEX: 29.45 KG/M2 | HEIGHT: 60 IN | DIASTOLIC BLOOD PRESSURE: 66 MMHG | TEMPERATURE: 97.7 F | SYSTOLIC BLOOD PRESSURE: 151 MMHG | OXYGEN SATURATION: 98 %

## 2018-08-14 DIAGNOSIS — I48.91 ATRIAL FIBRILLATION, UNSPECIFIED TYPE (HCC): Primary | ICD-10-CM

## 2018-08-14 LAB
INR BLD: 1.9 (ref 1–1.5)
PT POC: 22.3 SECONDS (ref 9.1–12)
VALID INTERNAL CONTROL?: YES

## 2018-08-14 NOTE — MR AVS SNAPSHOT
303 [facility] 
 
 
 [address], [place] [address] [place], [place] [address] [facility] 
[phone] Patient: Angella Skelton MRN:  [id number] Visit Information Date & Time Provider Department Dept. Phone Encounter #  
 8/14/2018 10:10 AM MD Melody Arredondo [address] [phone] [id number] Your Appointments 10/12/2018  9:15 AM  
ROUTINE CARE with MD Melody Arredondo 57 [address] ([address]) Appt Note: 6 month f/u $0cp wmc [address], [address] [facility] [address]  
[phone]  
  
   
 [address], [place] [address] [address] Upcoming Health Maintenance Date Due  
 GLAUCOMA SCREENING Q2Y 5/27/2017 Influenza Age 5 to Adult 8/1/2018 MEDICARE YEARLY EXAM 3/31/2019 DTaP/Tdap/Td series (2 - Td) 10/22/2024 Allergies as of 8/14/2018  Review Complete On: 8/14/2018 By: Enedelia Basurto RN No Known Allergies Current Immunizations  Reviewed on 3/23/2017 Name Date Influenza High Dose Vaccine PF 9/29/2017, 9/22/2016, 10/20/2015, 10/22/2014 Influenza Vaccine 12/6/2013 Pneumococcal Conjugate (PCV-13) 3/23/2017 Pneumococcal Polysaccharide (PPSV-23) 12/6/2013 Tdap 10/22/2014 Not reviewed this visit You Were Diagnosed With   
  
 Codes Comments [facility], unspecified [facility]    -  Primary ICD-10-CM: I48.91 
ICD-9-CM: 427.31 Vitals BP Pulse Temp Resp Height(growth percentile) Weight(growth percentile) 151/66 (BP 1 Location: Left arm, BP Patient Position: Sitting) 72 97.7 °F (36.5 °C) (Oral) 16 5' (1.524 m) 150 lb (68 kg) SpO2 BMI OB Status Smoking Status 98% 29.29 kg/m2 Postmenopausal Never Smoker Vitals History BMI and BSA Data Body Mass Index Body Surface Area  
 29.29 kg/m 2 1.7 m 2 Preferred Pharmacy Pharmacy Name Phone [facility] [address], [address] [phone] Your Updated Medication List  
  
   
This list is accurate as of 8/14/18 10:10 AM.  Always use your most recent med list. amLODIPine 5 mg tablet Commonly known as:  [place] Mend Take 1 Tab by mouth daily. * COUMADIN 5 mg tablet Generic drug:  warfarin Take 5 mg by mouth daily. Patient is currently taking coumadin taking 5mg Mon-Fri, 7. 5 mg Sat and Sun. Indications: S, S   7.5mg.  
  
 * warfarin 5 mg tablet Commonly known as:  COUMADIN  
TAKE 1 & 1/2 TABLETS BY MOUTH EVERY WEDNESDAY & SUNDAY & TAKE 1 TABLET BY MOUTH ALL OTHER DAYS FOR CLOTS  
  
 doxazosin 8 mg tablet Commonly known as:  CARDURA Take 0.5 tablet twice daily. flecainide 100 mg tablet Commonly known as:  TAMBOCOR Take 1 Tab by mouth three (3) times daily. hydroCHLOROthiazide 25 mg tablet Commonly known as:  HYDRODIURIL  
TAKE 1 TABLET BY MOUTH DAILY. levothyroxine 50 mcg tablet Commonly known as:  SYNTHROID  
TAKE 1 TABLET BY MOUTH EVERY MORNING BEFORE BREAKFAST FOR THYROID * Notice: This list has 2 medication(s) that are the same as other medications prescribed for you. Read the directions carefully, and ask your doctor or other care provider to review them with you. Description Continue current dosage of coumadin. Return in 2 wks for re-check August 2018 Details Sun Mon Tue Wed Thu Fri Sat  
     1  
  
  
  
   2  
  
  
  
   3  
  
  
  
   4  
  
  
  
  
  5  
  
  
  
   6  
  
  
  
   7  
  
  
  
   8  
  
  
  
   9  
  
  
  
   10  
  
  
  
   11  
  
  
  
  
  12  
  
  
  
   13  
  
  
  
   14  
  
5 mg See details 15  
  
7.5 mg  
  
   16  
  
5 mg  
  
   17  
  
7.5 mg  
  
   18  
  
5 mg 19  
  
5 mg  
  
   20  
  
7.5 mg  
  
   21  
  
5 mg  
  
   22  
  
7.5 mg  
  
   23  
  
5 mg  
  
   24  
  
7.5 mg  
  
   25  
  
5 mg 26  
  
5 mg  
  
   27  
  
7.5 mg  
  
   28  
  
5 mg  
  
   29  
  
  
  
   30  
  
  
  
   31  
  
  
  
   
 Date Details 08/14 This INR check INR: 1.9! Date of next INR:  8/28/2018 How to take your warfarin dose To take:  5 mg Take one of the 5 mg tablets. To take:  7.5 mg Take one and a half of the 5 mg tablets. We Performed the Following AMB POC PT/INR [30545 CPT(R)] Introducing [facility] & HEALTH SERVICES! [facility] introduces Shot & Shop patient portal. Now you can access parts of your medical record, email your doctor's office, and request medication refills online. 1. In your internet browser, go to [email or web address] 2. Click on the First Time User? Click Here link in the Sign In box. You will see the New Member Sign Up page. 3. Enter your Shot & Shop Access Code exactly as it appears below. You will not need to use this code after youve completed the sign-up process. If you do not sign up before the expiration date, you must request a new code. · Shot & Shop Access Code: [email or web address] Expires: 8/20/2018 10:16 AM 
 
4. Enter the last four digits of your Social Security Number (xxxx) and Date of Birth (mm/dd/yyyy) as indicated and click Submit. You will be taken to the next sign-up page. 5. Create a Shot & Shop ID. This will be your Shot & Shop login ID and cannot be changed, so think of one that is secure and easy to remember. 6. Create a Shot & Shop password. You can change your password at any time. 7. Enter your Password Reset Question and Answer. This can be used at a later time if you forget your password. 8. Enter your e-mail address. You will receive e-mail notification when new information is available in [address] 9. Click Sign Up. You can now view and download portions of your medical record. 10. Click the Download Summary menu link to download a portable copy of your medical information. If you have questions, please visit the Frequently Asked Questions section of the Invicta Networkst website. Remember, Tailster is NOT to be used for urgent needs. For medical emergencies, dial 911. Now available from your iPhone and Android! Please provide this summary of care documentation to your next provider. Your primary care clinician is listed as Lina Jacobs. If you have any questions after today's visit, please call [phone].

## 2018-08-28 ENCOUNTER — CLINICAL SUPPORT (OUTPATIENT)
Dept: FAMILY MEDICINE CLINIC | Age: 83
End: 2018-08-28

## 2018-08-28 VITALS
HEART RATE: 71 BPM | WEIGHT: 149.8 LBS | SYSTOLIC BLOOD PRESSURE: 126 MMHG | OXYGEN SATURATION: 96 % | RESPIRATION RATE: 20 BRPM | HEIGHT: 60 IN | TEMPERATURE: 97.9 F | DIASTOLIC BLOOD PRESSURE: 57 MMHG | BODY MASS INDEX: 29.41 KG/M2

## 2018-08-28 DIAGNOSIS — Z51.81 ANTICOAGULATION GOAL OF INR 2 TO 3: Primary | ICD-10-CM

## 2018-08-28 DIAGNOSIS — I48.91 ATRIAL FIBRILLATION, UNSPECIFIED TYPE (HCC): ICD-10-CM

## 2018-08-28 DIAGNOSIS — Z79.01 ANTICOAGULATION GOAL OF INR 2 TO 3: Primary | ICD-10-CM

## 2018-08-28 LAB
INR BLD: 2
PT POC: 23.4 SECONDS
VALID INTERNAL CONTROL?: YES

## 2018-08-28 RX ORDER — FLECAINIDE ACETATE 100 MG/1
100 TABLET ORAL 3 TIMES DAILY
Qty: 270 TAB | Refills: 1 | OUTPATIENT
Start: 2018-08-28

## 2018-08-28 NOTE — MR AVS SNAPSHOT
[address] [facility] 
 
 
 [address], [address] 1364 [facility] 
[phone] Patient: Katherin Garcia MRN:  [id number] Visit Information Date & Time Provider Department Dept. Phone Encounter #  
 8/28/2018 10:20 AM MD Melody Ho [address] [facility] [phone] [id number] Your Appointments 10/12/2018  9:15 AM  
ROUTINE CARE with MD Melody Ho [address] ([address]) Appt Note: 6 month f/u $0cp wmc [address], [address] [place] [address] [address]  
[phone]  
  
   
 [address], [place] [address] [address] Upcoming Health Maintenance Date Due  
 GLAUCOMA SCREENING Q2Y 5/27/2017 Influenza Age 5 to Adult 8/1/2018 MEDICARE YEARLY EXAM 3/31/2019 DTaP/Tdap/Td series (2 - Td) 10/22/2024 Allergies as of 8/28/2018  Review Complete On: 8/28/2018 By: Amor Other No Known Allergies Current Immunizations  Reviewed on 3/23/2017 Name Date Influenza High Dose Vaccine PF 9/29/2017, 9/22/2016, 10/20/2015, 10/22/2014 Influenza Vaccine 12/6/2013 Pneumococcal Conjugate (PCV-13) 3/23/2017 Pneumococcal Polysaccharide (PPSV-23) 12/6/2013 Tdap 10/22/2014 Not reviewed this visit You Were Diagnosed With   
  
 Codes Comments Anticoagulation goal of INR 2 to 3    -  Primary ICD-10-CM: Z51.81, Z79.01 
ICD-9-CM: V58.83, V58.61 Atrial fibrillation, unspecified type ([facility]     ICD-10-CM: I48.91 
ICD-9-CM: 427.31 Vitals BP Pulse Temp Resp Height(growth percentile) Weight(growth percentile) 126/57 (BP 1 Location: Left arm, BP Patient Position: Sitting) 71 97.9 °F (36.6 °C) (Oral) 20 5' (1.524 m) 149 lb 12.8 oz (67.9 kg) SpO2 BMI OB Status Smoking Status 96% 29.26 kg/m2 Postmenopausal Never Smoker Vitals History BMI and BSA Data  Body Mass Index Body Surface Area  
 29.26 kg/m 2 1.7 m 2  
  
  
 Preferred Pharmacy Pharmacy Name Phone [facility], [address] [phone] Your Updated Medication List  
  
   
This list is accurate as of 8/28/18 10:28 AM.  Always use your most recent med list. amLODIPine 5 mg tablet Commonly known as:  Bev Fraction Take 1 Tab by mouth daily. * COUMADIN 5 mg tablet Generic drug:  warfarin Take 5 mg by mouth daily. Patient is currently taking coumadin taking 5mg Mon-Fri, 7. 5 mg Sat and Sun. Indications: S, S   7.5mg.  
  
 * warfarin 5 mg tablet Commonly known as:  COUMADIN  
TAKE 1 & 1/2 TABLETS BY MOUTH EVERY WEDNESDAY & SUNDAY & TAKE 1 TABLET BY MOUTH ALL OTHER DAYS FOR CLOTS  
  
 doxazosin 8 mg tablet Commonly known as:  CARDURA Take 0.5 tablet twice daily. flecainide 100 mg tablet Commonly known as:  TAMBOCOR Take 1 Tab by mouth three (3) times daily. hydroCHLOROthiazide 25 mg tablet Commonly known as:  HYDRODIURIL  
TAKE 1 TABLET BY MOUTH DAILY. levothyroxine 50 mcg tablet Commonly known as:  SYNTHROID  
TAKE 1 TABLET BY MOUTH EVERY MORNING BEFORE BREAKFAST FOR THYROID * Notice: This list has 2 medication(s) that are the same as other medications prescribed for you. Read the directions carefully, and ask your doctor or other care provider to review them with you. Description Return in 1 month. No changes August 2018 Details Sun Mon Tue Wed Thu Fri Sat  
     1  
  
  
  
   2  
  
  
  
   3  
  
  
  
   4  
  
  
  
  
  5  
  
  
  
   6  
  
  
  
   7  
  
  
  
   8  
  
  
  
   9  
  
  
  
   10  
  
  
  
   11  
  
  
  
  
  12  
  
  
  
   13  
  
  
  
   14  
  
  
  
   15  
  
  
  
   16  
  
  
  
   17  
  
  
  
   18  
  
  
  
  
  19  
  
  
  
   20  
  
  
  
   21  
  
  
  
   22  
  
  
  
   23  
  
  
  
   24  
  
  
  
   25  
  
  
  
  
  26  
  
  
  
   27  
  
  
  
   28  
  
5 mg See details 29  
  
7.5 mg  
  
   30  
  
5 mg  
  
   31  
  
7.5 mg Date Details 08/28 This INR check INR: 2.0 How to take your warfarin dose To take:  5 mg Take one of the 5 mg tablets. To take:  7.5 mg Take one and a half of the 5 mg tablets. September 2018 Details Lashell Varner Tue Wed Thu Fri Sat  
        1  
  
5 mg 2  
  
5 mg  
  
   3  
  
7.5 mg  
  
   4  
  
5 mg  
  
   5  
  
7.5 mg  
  
   6  
  
5 mg  
  
   7  
  
7.5 mg  
  
   8  
  
5 mg  
  
  
  9  
  
5 mg  
  
   10  
  
7.5 mg  
  
   11  
  
5 mg  
  
   12  
  
7.5 mg  
  
   13  
  
5 mg  
  
   14  
  
7.5 mg  
  
   15  
  
5 mg  
  
  
  16  
  
5 mg  
  
   17  
  
7.5 mg  
  
   18  
  
5 mg  
  
   19  
  
7.5 mg  
  
   20  
  
5 mg  
  
   21  
  
7.5 mg  
  
   22  
  
5 mg  
  
  
  23  
  
5 mg  
  
   24  
  
7.5 mg  
  
   25  
  
5 mg  
  
   26  
  
  
  
   27  
  
  
  
   28  
  
  
  
   29  
  
  
  
  
  30  
  
  
  
        
 Date Details No additional details Date of next INR:  9/25/2018 How to take your warfarin dose To take:  5 mg Take one of the 5 mg tablets. To take:  7.5 mg Take one and a half of the 5 mg tablets. We Performed the Following AMB POC PT/INR [46554 CPT(R)] Patient Instructions Return in 1 month. Introducing [facility] & HEALTH SERVICES! Nahum Bean introduces [employer] patient portal. Now you can access parts of your medical record, email your doctor's office, and request medication refills online. 1. In your internet browser, go to [email or web address] 2. Click on the First Time User? Click Here link in the Sign In box. You will see the New Member Sign Up page. 3. Enter your [employer] Access Code exactly as it appears below. You will not need to use this code after youve completed the sign-up process.  If you do not sign up before the expiration date, you must request a new code. 
 
· [employer] Access Code: [email or web address] Expires: 11/26/2018 10:11 AM 
 
4. Enter the last four digits of your Social Security Number (xxxx) and Date of Birth (mm/dd/yyyy) as indicated and click Submit. You will be taken to the next sign-up page. 5. Create a [employer] ID. This will be your [employer] login ID and cannot be changed, so think of one that is secure and easy to remember. 6. Create a [employer] password. You can change your password at any time. 7. Enter your Password Reset Question and Answer. This can be used at a later time if you forget your password. 8. Enter your e-mail address. You will receive e-mail notification when new information is available in [address] 9. Click Sign Up. You can now view and download portions of your medical record. 10. Click the Download Summary menu link to download a portable copy of your medical information. If you have questions, please visit the Frequently Asked Questions section of the [employer] website. Remember, [employer] is NOT to be used for urgent needs. For medical emergencies, dial 911. Now available from your iPhone and Android! Please provide this summary of care documentation to your next provider. Your primary care clinician is listed as Genna Waddell. If you have any questions after today's visit, please call [phone].

## 2018-08-28 NOTE — PROGRESS NOTES
PT/INR drawn per Dr. Haley Gray Prior to Admission medications Medication Sig Start Date End Date Taking? Authorizing Provider  
warfarin (COUMADIN) 5 mg tablet TAKE 1 & 1/2 TABLETS BY MOUTH EVERY WEDNESDAY & SUNDAY & TAKE 1 TABLET BY MOUTH ALL OTHER DAYS FOR CLOTS 6/25/18  Yes Pranay Templeton MD  
warfarin (COUMADIN) 5 mg tablet Take 5 mg by mouth daily. Patient is currently taking coumadin taking 5mg Mon-Fri, 7. 5 mg Sat and Sun. Indications: S, S   7.5mg. Yes Historical Provider  
hydroCHLOROthiazide (HYDRODIURIL) 25 mg tablet TAKE 1 TABLET BY MOUTH DAILY. 6/13/18   Pranay Templeton MD  
levothyroxine (SYNTHROID) 50 mcg tablet TAKE 1 TABLET BY MOUTH EVERY MORNING BEFORE BREAKFAST FOR THYROID 6/13/18   Pranay Templeton MD  
amLODIPine (NORVASC) 5 mg tablet Take 1 Tab by mouth daily. 12/29/17   Pranay Templeton MD  
flecainide (TAMBOCOR) 100 mg tablet Take 1 Tab by mouth three (3) times daily. 12/18/17   Pranay Templeton MD  
doxazosin (CARDURA) 8 mg tablet Take 0.5 tablet twice daily. Patient taking differently: daily. Take 0.5 tablet twice daily. 10/23/17   Pranay Templeton MD  
 
Results for orders placed or performed in visit on 08/28/18 AMB POC PT/INR Result Value Ref Range VALID INTERNAL CONTROL POC Yes Prothrombin time (POC) 23.4 seconds INR POC 2.0

## 2018-08-28 NOTE — PROGRESS NOTES
NO medication changes at this time. Return in 1 month for PT/INR check. Continue 7.5mg coumadin  Monday, Wednesday and Friday and 5 mg coumadin all other days. Patient voiced understanding of all instructions. AVS given

## 2018-08-28 NOTE — TELEPHONE ENCOUNTER
Called and notified pt's daughter, Lorin Sneed that the refill needs to come from the cardiologist. She verbalized understanding.

## 2018-09-25 ENCOUNTER — CLINICAL SUPPORT (OUTPATIENT)
Dept: FAMILY MEDICINE CLINIC | Age: 83
End: 2018-09-25

## 2018-09-25 VITALS
BODY MASS INDEX: 29.25 KG/M2 | DIASTOLIC BLOOD PRESSURE: 55 MMHG | TEMPERATURE: 98.3 F | OXYGEN SATURATION: 98 % | SYSTOLIC BLOOD PRESSURE: 145 MMHG | HEIGHT: 60 IN | RESPIRATION RATE: 18 BRPM | WEIGHT: 149 LBS | HEART RATE: 70 BPM

## 2018-09-25 DIAGNOSIS — I48.91 ATRIAL FIBRILLATION, UNSPECIFIED TYPE (HCC): Primary | ICD-10-CM

## 2018-09-25 LAB
INR BLD: 2
PT POC: 23.6 SECONDS
VALID INTERNAL CONTROL?: YES

## 2018-09-25 NOTE — PROGRESS NOTES
Chief Complaint   Patient presents with    Anticoagulation     pt/inr     Pt is taking 1 1/2 5mg tablets M,W, Fri. Pt takes 1 5mg tablet all other days. Rodolfo Belle is a 80 y.o. female who presents today for Anticoagulation monitoring. Patient states current dose coumadin is:   Missed Coumadin Doses:  None  Medication Changes:  no  Dietary Changes:  no    Symptoms: without any bleeding. Anticoagulation Summary as of 9/25/2018     INR goal 2.0-3.0    Today's INR 2.0    Warfarin maintenance plan 7.5 mg (5 mg x 1.5) on Mon, Wed, Fri; 5 mg (5 mg x 1) all other days    Weekly warfarin total 42.5 mg    Plan last modified Ning Herndon LPN (7/48/1919)    Next INR check 10/25/2018    Target end date       Anticoagulation Episode Summary     INR check location Clinic Lab    Preferred lab [address] INR reminders to     Comments           Description          Follow up at next appointment with Dr. Avis Brown on 10/12/18 per Petrona Leahy. INR at goal. Continue current medications. Verbal and written instructions given to pt. Pt has upcoming appointment with Dr. Avis Brown on 10/12/18. Per Petrona Leahy, continue current medications, recheck at f/u appointment. Pt verbalized understanding. AVS given.

## 2018-09-25 NOTE — MR AVS SNAPSHOT
303 [facility] 
 
 
 [address], [address] 1364 [facility] 
[phone] Patient: Gerson Dejesus MRN:  [id number] Visit Information Date & Time Provider Department Dept. Phone Encounter #  
 2018 10:40 AM Jacqui Whyte, 5301 [facility] 80 [phone] [id number] Your Appointments 10/12/2018  9:15 AM  
ROUTINE CARE with MD Ivon Wilks. [address] ([facility]) Appt Note: 6 month f/u $0cp wmc [address], [address] [place] [address] [facility] [address]  
[phone]  
  
   
 [address], [place] [address] [address] Upcoming Health Maintenance Date Due Shingrix Vaccine Age 50> (1 of 2) 10[birth year]29/1981 GLAUCOMA SCREENING Q2Y 2017 Influenza Age 5 to Adult 2018 MEDICARE YEARLY EXAM 3/31/2019 DTaP/Tdap/Td series (2 - Td) 10/22/2024 Allergies as of 2018  Review Complete On: 2018 By: Susan Tee LPN No Known Allergies Current Immunizations  Reviewed on 3/23/2017 Name Date Influenza High Dose Vaccine PF 2017, 2016, 10/20/2015, 10/22/2014 Influenza Vaccine 2013 Pneumococcal Conjugate (PCV-13) 3/23/2017 Pneumococcal Polysaccharide (PPSV-23) 2013 Tdap 10/22/2014 Not reviewed this visit You Were Diagnosed With   
  
 Codes Comments Atrial fibrillation, unspecified type ([facility]    -  Primary ICD-10-CM: I48.91 
ICD-9-CM: 427.31 Vitals BP Pulse Temp Resp Height(growth percentile) Weight(growth percentile) 145/55 (BP 1 Location: Right arm, BP Patient Position: Sitting) 70 98.3 °F (36.8 °C) (Oral) 18 5' (1.524 m) 149 lb (67.6 kg) SpO2 BMI OB Status Smoking Status 98% 29.1 kg/m2 Postmenopausal Never Smoker Vitals History BMI and BSA Data Body Mass Index Body Surface Area  
 29.1 kg/m 2 1.69 m 2 Preferred Pharmacy Pharmacy Name Phone [facility], [address] [phone] Your Updated Medication List  
  
   
This list is accurate as of 9/25/18 10:42 AM.  Always use your most recent med list. amLODIPine 5 mg tablet Commonly known as:  Jesus Mend Take 1 Tab by mouth daily. * COUMADIN 5 mg tablet Generic drug:  warfarin Take 5 mg by mouth daily. Patient is currently taking coumadin taking 5mg Mon-Fri, 7. 5 mg Sat and Sun. Indications: S, S   7.5mg.  
  
 * warfarin 5 mg tablet Commonly known as:  COUMADIN  
TAKE 1 & 1/2 TABLETS BY MOUTH EVERY WEDNESDAY & SUNDAY & TAKE 1 TABLET BY MOUTH ALL OTHER DAYS FOR CLOTS  
  
 doxazosin 8 mg tablet Commonly known as:  CARDURA Take 0.5 tablet twice daily. flecainide 100 mg tablet Commonly known as:  TAMBOCOR Take 1 Tab by mouth three (3) times daily. hydroCHLOROthiazide 25 mg tablet Commonly known as:  HYDRODIURIL  
TAKE 1 TABLET BY MOUTH DAILY. levothyroxine 50 mcg tablet Commonly known as:  SYNTHROID  
TAKE 1 TABLET BY MOUTH EVERY MORNING BEFORE BREAKFAST FOR THYROID * Notice: This list has 2 medication(s) that are the same as other medications prescribed for you. Read the directions carefully, and ask your doctor or other care provider to review them with you. Description Follow up at next appointment with Dr. Gray Martines on 10/12/18 per [facility]. INR at goal. Continue current medications. September 2018 Details Sun Mon Tue Wed Thu Fri Sat  
        1  
  
  
  
  
  2  
  
  
  
   3  
  
  
  
   4  
  
  
  
   5  
  
  
  
   6  
  
  
  
   7  
  
  
  
   8  
  
  
  
  
  9  
  
  
  
   10  
  
  
  
   11  
  
  
  
   12  
  
  
  
   13  
  
  
  
   14  
  
  
  
   15  
  
  
  
  
  16  
  
  
  
   17  
  
  
  
   18  
  
  
  
   19  
  
  
  
   20  
  
  
  
   21  
  
  
  
   22  
  
  
  
  
  23  
  
  
  
   24  
  
  
  
   25  
  
5 mg See details 26  
  
7.5 mg  
  
   27  
  
5 mg  
  
   28  
  
7.5 mg  
  
   29  
  
5 mg  
  
  
  30  
  
5 mg Date Details 09/25 This INR check INR: 2.0 How to take your warfarin dose To take:  5 mg Take one of the 5 mg tablets. To take:  7.5 mg Take one and a half of the 5 mg tablets. October 2018 Details Venkat Rodriguez Wed Thu Fri Sat  
   1  
  
7.5 mg  
  
   2  
  
5 mg  
  
   3  
  
7.5 mg  
  
   4  
  
5 mg  
  
   5  
  
7.5 mg  
  
   6  
  
5 mg  
  
  
  7  
  
5 mg  
  
   8  
  
7.5 mg  
  
   9  
  
5 mg  
  
   10  
  
7.5 mg  
  
   11  
  
5 mg  
  
   12  
  
7.5 mg  
  
   13  
  
5 mg  
  
  
  14  
  
5 mg  
  
   15  
  
7.5 mg  
  
   16  
  
5 mg  
  
   17  
  
7.5 mg  
  
   18  
  
5 mg  
  
   19  
  
7.5 mg  
  
   20  
  
5 mg  
  
  
  21  
  
5 mg  
  
   22  
  
7.5 mg  
  
   23  
  
5 mg  
  
   24  
  
7.5 mg  
  
   25  
  
5 mg  
  
   26  
  
  
  
   27  
  
  
  
  
  28  
  
  
  
   29  
  
  
  
   30  
  
  
  
   31  
  
  
  
     
 Date Details No additional details Date of next INR:  10/25/2018 How to take your warfarin dose To take:  5 mg Take one of the 5 mg tablets. To take:  7.5 mg Take one and a half of the 5 mg tablets. We Performed the Following AMB POC PT/INR [90094 CPT(R)] Introducing [facility] & HEALTH SERVICES! Rios Burgos introduces [employer] patient portal. Now you can access parts of your medical record, email your doctor's office, and request medication refills online. 1. In your internet browser, go to [email or web address] 2. Click on the First Time User? Click Here link in the Sign In box. You will see the New Member Sign Up page. 3. Enter your [employer] Access Code exactly as it appears below. You will not need to use this code after youve completed the sign-up process.  If you do not sign up before the expiration date, you must request a new code. 
 
· [employer] Access Code: [email or web address] Expires: 11/26/2018 10:11 AM 
 
4. Enter the last four digits of your Social Security Number (xxxx) and Date of Birth (mm/dd/yyyy) as indicated and click Submit. You will be taken to the next sign-up page. 5. Create a [employer] ID. This will be your [employer] login ID and cannot be changed, so think of one that is secure and easy to remember. 6. Create a [employer] password. You can change your password at any time. 7. Enter your Password Reset Question and Answer. This can be used at a later time if you forget your password. 8. Enter your e-mail address. You will receive e-mail notification when new information is available in [address] 9. Click Sign Up. You can now view and download portions of your medical record. 10. Click the Download Summary menu link to download a portable copy of your medical information. If you have questions, please visit the Frequently Asked Questions section of the [employer] website. Remember, [employer] is NOT to be used for urgent needs. For medical emergencies, dial 911. Now available from your iPhone and Android! Please provide this summary of care documentation to your next provider. Your primary care clinician is listed as Melissa Bay. If you have any questions after today's visit, please call [phone].

## 2018-10-26 ENCOUNTER — HOSPITAL ENCOUNTER (OUTPATIENT)
Dept: LAB | Age: 83
Discharge: HOME | End: 2018-10-26
Payer: MEDICARE

## 2018-10-26 ENCOUNTER — OFFICE VISIT (OUTPATIENT)
Dept: FAMILY MEDICINE CLINIC | Age: 83
End: 2018-10-26

## 2018-10-26 VITALS
DIASTOLIC BLOOD PRESSURE: 69 MMHG | WEIGHT: 152 LBS | RESPIRATION RATE: 18 BRPM | BODY MASS INDEX: 29.84 KG/M2 | HEIGHT: 60 IN | HEART RATE: 71 BPM | OXYGEN SATURATION: 97 % | SYSTOLIC BLOOD PRESSURE: 146 MMHG | TEMPERATURE: 97.9 F

## 2018-10-26 DIAGNOSIS — E03.9 HYPOTHYROIDISM, UNSPECIFIED TYPE: ICD-10-CM

## 2018-10-26 DIAGNOSIS — Z23 ENCOUNTER FOR IMMUNIZATION: ICD-10-CM

## 2018-10-26 DIAGNOSIS — I10 ESSENTIAL HYPERTENSION: ICD-10-CM

## 2018-10-26 DIAGNOSIS — I48.91 ATRIAL FIBRILLATION, UNSPECIFIED TYPE (HCC): Primary | ICD-10-CM

## 2018-10-26 DIAGNOSIS — E78.5 HYPERLIPIDEMIA, UNSPECIFIED HYPERLIPIDEMIA TYPE: ICD-10-CM

## 2018-10-26 LAB
INR BLD: 2.9 (ref 1–1.5)
PT POC: 36.4 SECONDS (ref 9.1–12)
VALID INTERNAL CONTROL?: YES

## 2018-10-26 PROCEDURE — 85025 COMPLETE CBC W/AUTO DIFF WBC: CPT

## 2018-10-26 PROCEDURE — 80053 COMPREHEN METABOLIC PANEL: CPT

## 2018-10-26 PROCEDURE — 84443 ASSAY THYROID STIM HORMONE: CPT

## 2018-10-26 PROCEDURE — 36415 COLL VENOUS BLD VENIPUNCTURE: CPT

## 2018-10-26 NOTE — PATIENT INSTRUCTIONS
Vaccine Information Statement Influenza (Flu) Vaccine (Inactivated or Recombinant): What you need to know Many Vaccine Information Statements are available in Persian and other languages. See www.immunize.org/vis Hojas de Información Sobre Vacunas están disponibles en Español y en muchos otros idiomas. Visite www.immunize.org/vis 1. Why get vaccinated? Influenza (flu) is a contagious disease that spreads around the [place] every year, usually between October and May. Flu is caused by influenza viruses, and is spread mainly by coughing, sneezing, and close contact. Anyone can get flu. Flu strikes suddenly and can last several days. Symptoms vary by age, but can include: 
 fever/chills  sore throat  muscle aches  fatigue  cough  headache  runny or stuffy nose Flu can also lead to pneumonia and blood infections, and cause diarrhea and seizures in children. If you have a medical condition, such as heart or lung disease, flu can make it worse. Flu is more dangerous for some people. Infants and young children, people 72years of age and older, pregnant women, and people with certain health conditions or a weakened immune system are at greatest risk. Each year thousands of people in the [facility] die from flu, and many more are hospitalized. Flu vaccine can: 
 keep you from getting flu, 
 make flu less severe if you do get it, and 
 keep you from spreading flu to your family and other people. 2. Inactivated and recombinant flu vaccines A dose of flu vaccine is recommended every flu season. Children 6 months through 6years of age may need two doses during the same flu season. Everyone else needs only one dose each flu season.   
 
 
Some inactivated flu vaccines contain a very small amount of a mercury-based preservative called thimerosal. Studies have not shown thimerosal in vaccines to be harmful, but flu vaccines that do not contain thimerosal are available. There is no live flu virus in flu shots. They cannot cause the flu. There are many flu viruses, and they are always changing. Each year a new flu vaccine is made to protect against three or four viruses that are likely to cause disease in the upcoming flu season. But even when the vaccine doesnt exactly match these viruses, it may still provide some protection Flu vaccine cannot prevent: 
 flu that is caused by a virus not covered by the vaccine, or 
 illnesses that look like flu but are not. It takes about 2 weeks for protection to develop after vaccination, and protection lasts through the flu season. 3. Some people should not get this vaccine Tell the person who is giving you the vaccine:  If you have any severe, life-threatening allergies. If you ever had a life-threatening allergic reaction after a dose of flu vaccine, or have a severe allergy to any part of this vaccine, you may be advised not to get vaccinated. Most, but not all, types of flu vaccine contain a small amount of egg protein.  If you ever had Guillain-Barré Syndrome (also called GBS). Some people with a history of GBS should not get this vaccine. This should be discussed with your doctor.  If you are not feeling well. It is usually okay to get flu vaccine when you have a mild illness, but you might be asked to come back when you feel better. 4. Risks of a vaccine reaction With any medicine, including vaccines, there is a chance of reactions. These are usually mild and go away on their own, but serious reactions are also possible. Most people who get a flu shot do not have any problems with it. Minor problems following a flu shot include:  
 soreness, redness, or swelling where the shot was given  hoarseness  sore, red or itchy eyes  cough  fever  aches  headache  itching  fatigue If these problems occur, they usually begin soon after the shot and last 1 or 2 days. More serious problems following a flu shot can include the following:  There may be a small increased risk of Guillain-Barré Syndrome (GBS) after inactivated flu vaccine. This risk has been estimated at 1 or 2 additional cases per million people vaccinated. This is much lower than the risk of severe complications from flu, which can be prevented by flu vaccine.  Young children who get the flu shot along with pneumococcal vaccine (PCV13) and/or DTaP vaccine at the same time might be slightly more likely to have a seizure caused by fever. Ask your doctor for more information. Tell your doctor if a child who is getting flu vaccine has ever had a seizure. Problems that could happen after any injected vaccine:  People sometimes faint after a medical procedure, including vaccination. Sitting or lying down for about 15 minutes can help prevent fainting, and injuries caused by a fall. Tell your doctor if you feel dizzy, or have vision changes or ringing in the ears.  Some people get severe pain in the shoulder and have difficulty moving the arm where a shot was given. This happens very rarely.  Any medication can cause a severe allergic reaction. Such reactions from a vaccine are very rare, estimated at about 1 in a million doses, and would happen within a few minutes to a few hours after the vaccination. As with any medicine, there is a very remote chance of a vaccine causing a serious injury or death. The safety of vaccines is always being monitored. For more information, visit: www.cdc.gov/vaccinesafety/ 
 
5. What if there is a serious reaction? What should I look for?  Look for anything that concerns you, such as signs of a severe allergic reaction, very high fever, or unusual behavior.  
 
Signs of a severe allergic reaction can include hives, swelling of the face and throat, difficulty breathing, a fast heartbeat, dizziness, and weakness  usually within a few minutes to a few hours after the vaccination. What should I do?  If you think it is a severe allergic reaction or other emergency that cant wait, call 9-1-1 and get the person to the nearest hospital. Otherwise, call your doctor.  Reactions should be reported to the Vaccine Adverse Event Reporting System (VAERS). Your doctor should file this report, or you can do it yourself through  the VAERS web site at www.vaers. [facility].gov, or by calling 7-319.582.8566. VAERS does not give medical advice. 6. The National Vaccine Injury Compensation Program 
 
The [facility] Vaccine Injury Compensation Program (VICP) is a federal program that was created to compensate people who may have been injured by certain vaccines. Persons who believe they may have been injured by a vaccine can learn about the program and about filing a claim by calling 4-766.873.5160 or visiting the [employer] website at www.[facility].gov/vaccinecompensation. There is a time limit to file a claim for compensation. 7. How can I learn more?  Ask your healthcare provider. He or she can give you the vaccine package insert or suggest other sources of information.  Call your local or state health department.  Contact the Centers for Disease Control and Prevention (CDC): 
- Call 6-707.965.9476 (1-800-CDC-INFO) or 
- Visit CDCs website at www.cdc.gov/flu Vaccine Information Statement Inactivated Influenza Vaccine 8/7/2015 
[id number]-19 [facility] and [employer] Centers for Disease Control and Prevention Office Use Only

## 2018-10-26 NOTE — PROGRESS NOTES
HPI: 
80 y.o.  presents for follow up appointment. No hospital, ER or specialist visits since last primary care visit except as noted below. Patient Active Problem List  
 Diagnosis  Widened pulse pressure  Pulmonary hypertension (HCC)  
  1/16 - PASP=68, EF 55% No home monitoring.  Hyperlipidemia  Hypothyroid - No hair loss, no constipation, no dry skin or brittle nails, no palpitations. Taking medication each morning on an empty stomach.  S/P lobectomy of lung  Atrial fibrillation ([address] Cardioverted by flecainide  Hypertension - Compliant with medication. No shortness of breath, no chest pain, no vision change, no headache, no lower extremity edema. Past Medical History:  
Diagnosis Date  Atrial fibrillation [address] [address]  Hyperlipidemia 6/18/2014  Hypertension  Hypothyroid 3/18/2014  Pulmonary hypertension (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  
 Drug use: No  
 
 
Outpatient Medications Marked as Taking for the 10/26/18 encounter (Office Visit) with Chyna Buckley MD  
Medication Sig Dispense Refill  warfarin (COUMADIN) 5 mg tablet TAKE 1 & 1/2 TABLETS BY MOUTH EVERY WEDNESDAY & SUNDAY & TAKE 1 TABLET BY MOUTH ALL OTHER DAYS FOR CLOTS 180 Tab 3  
 hydroCHLOROthiazide (HYDRODIURIL) 25 mg tablet TAKE 1 TABLET BY MOUTH DAILY. 90 Tab 3  
 levothyroxine (SYNTHROID) 50 mcg tablet TAKE 1 TABLET BY MOUTH EVERY MORNING BEFORE BREAKFAST FOR THYROID 90 Tab 3  
 amLODIPine (NORVASC) 5 mg tablet Take 1 Tab by mouth daily. 90 Tab 3  
 flecainide (TAMBOCOR) 100 mg tablet Take 1 Tab by mouth three (3) times daily. 270 Tab 1  
 doxazosin (CARDURA) 8 mg tablet Take 0.5 tablet twice daily.  (Patient taking differently: daily. Take 0.5 tablet twice daily.) 90 Tab 3  warfarin (COUMADIN) 5 mg tablet Take 5 mg by mouth daily. Patient is currently taking coumadin taking 5mg Mon-Fri, 7. 5 mg Sat and Sun. Indications: S, S   7.5mg. No Known Allergies ROS: 
ROS negative except as per HPI. PE: 
Visit Vitals /69 (BP 1 Location: Left arm, BP Patient Position: Sitting) Pulse 71 Temp 97.9 °F (36.6 °C) (Oral) Resp 18 Ht 5' (1.524 m) Wt 152 lb (68.9 kg) SpO2 97% BMI 29.69 kg/m² Gen: alert, oriented, no acute distress Eyes: pupils equal round reactive to light, sclera clear, conjunctiva clear Oral: moist mucus membranes, no oral lesions, no pharyngeal inflammation or exudate Neck: symmetric normal sized thyroid, no carotid bruits, no jugular vein distention Resp: no increase work of breathing, lungs clear to ausculation bilaterally, no wheezing, rales or rhonchi CV: S1, S2 normal.  No murmurs, rubs, or gallops. Abd: soft, not tender, not distended. No hepatosplenomegaly. Normal bowel sounds. Neuro: cranial nerves intact, normal strength and movement in all extremities, reflexes and sensation intact and symmetric. Skin: no lesion or rash Extremities: no cyanosis or edema Assessment/Plan: 1. Atrial fibrillation, unspecified type ([address] Stable, rate controlled  INR at goal today - AMB POC PT/INR 2. Hypothyroidism, unspecified type At goal.  Continue current medications.  
- TSH 3RD GENERATION 3. Hyperlipidemia, unspecified hyperlipidemia type No changes in medications pending lab results. 4. Essential hypertension At goal.  Continue current medications. - CBC WITH AUTOMATED DIFF 
- METABOLIC PANEL, COMPREHENSIVE 5. Encounter for immunization 
- INFLUENZA VACCINE INACTIVATED (IIV), SUBUNIT, ADJUVANTED, IM 
- ADMIN INFLUENZA VIRUS VAC Health Maintenance reviewed - updated. Orders Placed This Encounter  Influenza Vaccine Inactivated (IIV)(FLUAD), Subunit, Adjuvanted, IM, (59558)  CBC WITH AUTOMATED DIFF  
 METABOLIC PANEL, COMPREHENSIVE  
 TSH 3RD GENERATION  
 AMB POC PT/INR  Administration fee () for Medicare insured patients There are no discontinued medications. Current Outpatient Medications Medication Sig Dispense Refill  warfarin (COUMADIN) 5 mg tablet TAKE 1 & 1/2 TABLETS BY MOUTH EVERY WEDNESDAY & SUNDAY & TAKE 1 TABLET BY MOUTH ALL OTHER DAYS FOR CLOTS 180 Tab 3  
 hydroCHLOROthiazide (HYDRODIURIL) 25 mg tablet TAKE 1 TABLET BY MOUTH DAILY. 90 Tab 3  
 levothyroxine (SYNTHROID) 50 mcg tablet TAKE 1 TABLET BY MOUTH EVERY MORNING BEFORE BREAKFAST FOR THYROID 90 Tab 3  
 amLODIPine (NORVASC) 5 mg tablet Take 1 Tab by mouth daily. 90 Tab 3  
 flecainide (TAMBOCOR) 100 mg tablet Take 1 Tab by mouth three (3) times daily. 270 Tab 1  
 doxazosin (CARDURA) 8 mg tablet Take 0.5 tablet twice daily. (Patient taking differently: daily. Take 0.5 tablet twice daily.) 90 Tab 3  warfarin (COUMADIN) 5 mg tablet Take 5 mg by mouth daily. Patient is currently taking coumadin taking 5mg Mon-Fri, 7. 5 mg Sat and Sun. Indications: S, S   7.5mg. Verbal and written instructions (see AVS) provided. Patient expresses understanding of diagnosis and treatment plan.

## 2018-10-26 NOTE — LETTER
11/2/2018 4:10 PM 
 
Ms. Eula Rodriguez [address] [facility] Dear Eula Rodriguez: 
 
Please find your most recent results below. Resulted Orders AMB POC PT/INR Result Value Ref Range VALID INTERNAL CONTROL POC Yes Prothrombin time (POC) 36.4 (A) 9.1 - 12 seconds INR POC 2.9 (A) 1 - 1.5  
CBC WITH AUTOMATED DIFF Result Value Ref Range WBC 6.7 3.4 - 10.8 x10E3/uL  
 RBC 3.94 3.77 - 5.28 x10E6/uL HGB 12.1 11.1 - 15.9 g/dL HCT 36.0 34.0 - 46.6 % MCV 91 79 - 97 fL  
 MCH 30.7 26.6 - 33.0 pg  
 MCHC 33.6 31.5 - 35.7 g/dL  
 RDW 14.1 12.3 - 15.4 % PLATELET 628 029 - 250 x10E3/uL NEUTROPHILS 59 Not Estab. % Lymphocytes 27 Not Estab. % MONOCYTES 11 Not Estab. % EOSINOPHILS 3 Not Estab. % BASOPHILS 0 Not Estab. %  
 ABS. NEUTROPHILS 4.0 1.4 - 7.0 x10E3/uL Abs Lymphocytes 1.8 0.7 - 3.1 x10E3/uL  
 ABS. MONOCYTES 0.7 0.1 - 0.9 x10E3/uL  
 ABS. EOSINOPHILS 0.2 0.0 - 0.4 x10E3/uL  
 ABS. BASOPHILS 0.0 0.0 - 0.2 x10E3/uL IMMATURE GRANULOCYTES 0 Not Estab. %  
 ABS. IMM. GRANS. 0.0 0.0 - 0.1 x10E3/uL Narrative Performed at:  [address]  [id number] : Adilson Gray MD, Phone:  [phone] METABOLIC PANEL, COMPREHENSIVE Result Value Ref Range Glucose 90 65 - 99 mg/dL BUN 25 8 - 27 mg/dL Creatinine 0.93 0.57 - 1.00 mg/dL GFR est non-AA 56 (L) >59 mL/min/1.73 GFR est AA 64 >59 mL/min/1.73  
 BUN/Creatinine ratio 27 12 - 28 Sodium 141 134 - 144 mmol/L Potassium 4.3 3.5 - 5.2 mmol/L Chloride 99 96 - 106 mmol/L  
 CO2 29 20 - 29 mmol/L Calcium 9.9 8.7 - 10.3 mg/dL Protein, total 6.5 6.0 - 8.5 g/dL Albumin 4.3 3.5 - 4.7 g/dL GLOBULIN, TOTAL 2.2 1.5 - 4.5 g/dL A-G Ratio 2.0 1.2 - 2.2 Bilirubin, total 0.3 0.0 - 1.2 mg/dL Alk. phosphatase 64 39 - 117 IU/L  
 AST (SGOT) 15 0 - 40 IU/L  
 ALT (SGPT) 7 0 - 32 IU/L Narrative Performed at:  [address]  [id number] : Humble Dave MD, Phone:  [phone] TSH 3RD GENERATION Result Value Ref Range TSH 2.130 0.450 - 4.500 uIU/mL Narrative Performed at:  [address]  [id number] : Humble Dave MD, Phone:  [phone] CKD REPORT Result Value Ref Range Interpretation Note Comment:  
   Supplemental report is available. Narrative Performed at:  [address] [place] [address] [address] [place] [address]  [id number] : Kylie Diamond MD, Phone:  [phone] RECOMMENDATIONS: 
All your labs look great - like a 24year old! Please call me if you have any questions: [phone] Sincerely, Joan Ruiz MD

## 2018-10-27 LAB
ALBUMIN SERPL-MCNC: 4.3 G/DL (ref 3.5–4.7)
ALBUMIN/GLOB SERPL: 2 {RATIO} (ref 1.2–2.2)
ALP SERPL-CCNC: 64 IU/L (ref 39–117)
ALT SERPL-CCNC: 7 IU/L (ref 0–32)
AST SERPL-CCNC: 15 IU/L (ref 0–40)
BASOPHILS # BLD AUTO: 0 X10E3/UL (ref 0–0.2)
BASOPHILS NFR BLD AUTO: 0 %
BILIRUB SERPL-MCNC: 0.3 MG/DL (ref 0–1.2)
BUN SERPL-MCNC: 25 MG/DL (ref 8–27)
BUN/CREAT SERPL: 27 (ref 12–28)
CALCIUM SERPL-MCNC: 9.9 MG/DL (ref 8.7–10.3)
CHLORIDE SERPL-SCNC: 99 MMOL/L (ref 96–106)
CO2 SERPL-SCNC: 29 MMOL/L (ref 20–29)
CREAT SERPL-MCNC: 0.93 MG/DL (ref 0.57–1)
EOSINOPHIL # BLD AUTO: 0.2 X10E3/UL (ref 0–0.4)
EOSINOPHIL NFR BLD AUTO: 3 %
ERYTHROCYTE [DISTWIDTH] IN BLOOD BY AUTOMATED COUNT: 14.1 % (ref 12.3–15.4)
GLOBULIN SER CALC-MCNC: 2.2 G/DL (ref 1.5–4.5)
GLUCOSE SERPL-MCNC: 90 MG/DL (ref 65–99)
HCT VFR BLD AUTO: 36 % (ref 34–46.6)
HGB BLD-MCNC: 12.1 G/DL (ref 11.1–15.9)
IMM GRANULOCYTES # BLD: 0 X10E3/UL (ref 0–0.1)
IMM GRANULOCYTES NFR BLD: 0 %
INTERPRETATION: NORMAL
LYMPHOCYTES # BLD AUTO: 1.8 X10E3/UL (ref 0.7–3.1)
LYMPHOCYTES NFR BLD AUTO: 27 %
MCH RBC QN AUTO: 30.7 PG (ref 26.6–33)
MCHC RBC AUTO-ENTMCNC: 33.6 G/DL (ref 31.5–35.7)
MCV RBC AUTO: 91 FL (ref 79–97)
MONOCYTES # BLD AUTO: 0.7 X10E3/UL (ref 0.1–0.9)
MONOCYTES NFR BLD AUTO: 11 %
NEUTROPHILS # BLD AUTO: 4 X10E3/UL (ref 1.4–7)
NEUTROPHILS NFR BLD AUTO: 59 %
PLATELET # BLD AUTO: 229 X10E3/UL (ref 150–379)
POTASSIUM SERPL-SCNC: 4.3 MMOL/L (ref 3.5–5.2)
PROT SERPL-MCNC: 6.5 G/DL (ref 6–8.5)
RBC # BLD AUTO: 3.94 X10E6/UL (ref 3.77–5.28)
SODIUM SERPL-SCNC: 141 MMOL/L (ref 134–144)
TSH SERPL DL<=0.005 MIU/L-ACNC: 2.13 UIU/ML (ref 0.45–4.5)
WBC # BLD AUTO: 6.7 X10E3/UL (ref 3.4–10.8)

## 2018-11-23 ENCOUNTER — CLINICAL SUPPORT (OUTPATIENT)
Dept: FAMILY MEDICINE CLINIC | Age: 83
End: 2018-11-23

## 2018-11-23 VITALS
WEIGHT: 155 LBS | OXYGEN SATURATION: 98 % | HEIGHT: 60 IN | TEMPERATURE: 97.9 F | HEART RATE: 69 BPM | RESPIRATION RATE: 14 BRPM | BODY MASS INDEX: 30.43 KG/M2 | SYSTOLIC BLOOD PRESSURE: 172 MMHG | DIASTOLIC BLOOD PRESSURE: 68 MMHG

## 2018-11-23 DIAGNOSIS — I48.20 CHRONIC ATRIAL FIBRILLATION (HCC): Primary | ICD-10-CM

## 2018-11-23 DIAGNOSIS — Z79.01 ANTICOAGULATION MONITORING, INR RANGE 2-3: ICD-10-CM

## 2018-11-23 LAB
INR BLD: 2.5
PT POC: 30.5 SECONDS
VALID INTERNAL CONTROL?: YES

## 2018-11-23 NOTE — PROGRESS NOTES
Patient presents for a PT/INR  See med list and patient instructions along with results for verbal orders by Dr. Shad Jacobsen Results for orders placed or performed in visit on 11/23/18 AMB POC PT/INR Result Value Ref Range VALID INTERNAL CONTROL POC Yes Prothrombin time (POC) 30.5 seconds INR POC 2.5 Vitals:  
 11/23/18 2988 11/23/18 7017 BP: 187/68 172/68 Pulse: 69 Resp: 14 Temp: 97.9 °F (36.6 °C) TempSrc: Oral   
SpO2: 98% Weight: 155 lb (70.3 kg) Height: 5' (1.524 m) Prior to Admission medications Medication Sig Start Date End Date Taking? Authorizing Provider  
warfarin (COUMADIN) 5 mg tablet TAKE 1 & 1/2 TABLETS BY MOUTH EVERY WEDNESDAY & SUNDAY & TAKE 1 TABLET BY MOUTH ALL OTHER DAYS FOR CLOTS 6/25/18  Yes Kate Ramos MD  
hydroCHLOROthiazide (HYDRODIURIL) 25 mg tablet TAKE 1 TABLET BY MOUTH DAILY. 6/13/18  Yes Kate Ramos MD  
levothyroxine (SYNTHROID) 50 mcg tablet TAKE 1 TABLET BY MOUTH EVERY MORNING BEFORE BREAKFAST FOR THYROID 6/13/18  Yes Kate Ramos MD  
amLODIPine (NORVASC) 5 mg tablet Take 1 Tab by mouth daily. 12/29/17  Yes Kate Ramos MD  
flecainide (TAMBOCOR) 100 mg tablet Take 1 Tab by mouth three (3) times daily. 12/18/17  Yes Kate Ramos MD  
doxazosin (CARDURA) 8 mg tablet Take 0.5 tablet twice daily. Patient taking differently: daily. Take 0.5 tablet twice daily. 10/23/17  Yes Kate Ramos MD  
Per Dr. Kori Helm continue coumadin 7.5 mg on Monday , Wednesday and Friday and 5 mg the rest of the week. BP is elevated last took it twice with last reading 178/68. She said she was eating a salted ham yesterday with thanks giving dinner. Dr. Shad Jacobsen notified of BP reading. Dr. Shad Jacobsen said to have her limit sodium intake. See anti-coag episode for instructions which are on AVS. AVS and verbal instructions given she voiced understanding of all instructions.

## 2018-12-21 ENCOUNTER — CLINICAL SUPPORT (OUTPATIENT)
Dept: FAMILY MEDICINE CLINIC | Age: 83
End: 2018-12-21

## 2018-12-21 VITALS
HEART RATE: 76 BPM | TEMPERATURE: 98.7 F | OXYGEN SATURATION: 96 % | WEIGHT: 155 LBS | SYSTOLIC BLOOD PRESSURE: 125 MMHG | HEIGHT: 60 IN | RESPIRATION RATE: 14 BRPM | DIASTOLIC BLOOD PRESSURE: 64 MMHG | BODY MASS INDEX: 30.43 KG/M2

## 2018-12-21 DIAGNOSIS — Z79.01 ANTICOAGULATION MONITORING, INR RANGE 2-3: ICD-10-CM

## 2018-12-21 DIAGNOSIS — I48.0 PAROXYSMAL ATRIAL FIBRILLATION (HCC): Primary | ICD-10-CM

## 2018-12-21 LAB
INR BLD: 2.4
PT POC: 28.4 SECONDS
VALID INTERNAL CONTROL?: YES

## 2018-12-21 NOTE — PROGRESS NOTES
Patient presents for a PT/INR  See med list and patient instructions along with results for verbal orders by Dr. Sophie Barajas  Results for orders placed or performed in visit on 12/21/18   AMB POC PT/INR   Result Value Ref Range    VALID INTERNAL CONTROL POC Yes     Prothrombin time (POC) 28.4 seconds    INR POC 2.4         Vitals:    12/21/18 1005   BP: 125/64   Pulse: 76   Resp: 14   Temp: 98.7 °F (37.1 °C)   TempSrc: Oral   SpO2: 96%   Weight: 155 lb (70.3 kg)   Height: 5' (1.524 m)     Prior to Admission medications    Medication Sig Start Date End Date Taking? Authorizing Provider   warfarin (COUMADIN) 5 mg tablet TAKE 1 & 1/2 TABLETS BY MOUTH EVERY WEDNESDAY & SUNDAY & TAKE 1 TABLET BY MOUTH ALL OTHER DAYS FOR CLOTS 6/25/18  Yes Katherin Muñoz MD   hydroCHLOROthiazide (HYDRODIURIL) 25 mg tablet TAKE 1 TABLET BY MOUTH DAILY. 6/13/18  Yes Katherin Muñoz MD   levothyroxine (SYNTHROID) 50 mcg tablet TAKE 1 TABLET BY MOUTH EVERY MORNING BEFORE BREAKFAST FOR THYROID 6/13/18  Yes Katherin Muñoz MD   amLODIPine (NORVASC) 5 mg tablet Take 1 Tab by mouth daily. 12/29/17  Yes Katherin Muñoz MD   flecainide (TAMBOCOR) 100 mg tablet Take 1 Tab by mouth three (3) times daily. 12/18/17  Yes Katherin Muñoz MD   doxazosin (CARDURA) 8 mg tablet Take 0.5 tablet twice daily. Patient taking differently: daily. Take 0.5 tablet twice daily. 10/23/17  Yes Katherin Muñoz MD     Per Dr. Avila Ca verbal order she is to continue Coumadin 5 mg tablet taking 7.5 mg Monday, Wednesday and Friday and 5 mg the rest of the week. See anti-coag episode for instructions which are on AVS.    AVS and verbal instructions given she voiced understanding of all instructions.

## 2018-12-28 RX ORDER — AMLODIPINE BESYLATE 5 MG/1
5 TABLET ORAL DAILY
Qty: 90 TAB | Refills: 3 | Status: SHIPPED | OUTPATIENT
Start: 2018-12-28 | End: 2020-03-23

## 2019-01-21 ENCOUNTER — CLINICAL SUPPORT (OUTPATIENT)
Dept: FAMILY MEDICINE CLINIC | Age: 84
End: 2019-01-21

## 2019-01-21 VITALS
TEMPERATURE: 98.4 F | HEART RATE: 76 BPM | RESPIRATION RATE: 14 BRPM | HEIGHT: 60 IN | WEIGHT: 154 LBS | BODY MASS INDEX: 30.23 KG/M2 | OXYGEN SATURATION: 98 % | SYSTOLIC BLOOD PRESSURE: 167 MMHG | DIASTOLIC BLOOD PRESSURE: 67 MMHG

## 2019-01-21 DIAGNOSIS — I48.0 PAROXYSMAL ATRIAL FIBRILLATION (HCC): Primary | ICD-10-CM

## 2019-01-21 LAB
INR BLD: 2.1
PT POC: 24.8 SECONDS
VALID INTERNAL CONTROL?: YES

## 2019-01-21 NOTE — PROGRESS NOTES
Patient is here for a recheck of her PT/INR. Patients last PT/INR on 12/21/18:   28.4 / 2.4 Patient is currently on Coumadin 5 mg tablet taking 7.5 mg Monday, Wednesday and Friday and 5 mg the rest of the week. Results for orders placed or performed in visit on 01/21/19 AMB POC PT/INR Result Value Ref Range VALID INTERNAL CONTROL POC Yes Prothrombin time (POC) 24.8 seconds INR POC 2.1 Visit Vitals /67 (BP 1 Location: Left arm, BP Patient Position: Sitting) Pulse 76 Temp 98.4 °F (36.9 °C) Resp 14 Ht 5' (1.524 m) Wt 154 lb (69.9 kg) SpO2 98% BMI 30.08 kg/m² INR stable. Patient verbalized understanding to continue taking 7.5 mg on M,W, F and 5 mg all other days. Coumadin calendar given.

## 2019-02-18 ENCOUNTER — CLINICAL SUPPORT (OUTPATIENT)
Dept: FAMILY MEDICINE CLINIC | Age: 84
End: 2019-02-18

## 2019-02-18 VITALS
WEIGHT: 155 LBS | DIASTOLIC BLOOD PRESSURE: 66 MMHG | BODY MASS INDEX: 30.43 KG/M2 | SYSTOLIC BLOOD PRESSURE: 147 MMHG | HEART RATE: 78 BPM | OXYGEN SATURATION: 95 % | RESPIRATION RATE: 12 BRPM | HEIGHT: 60 IN | TEMPERATURE: 97.9 F

## 2019-02-18 DIAGNOSIS — I48.0 PAROXYSMAL ATRIAL FIBRILLATION (HCC): Primary | ICD-10-CM

## 2019-02-18 LAB
INR BLD: 2.5
PT POC: 29.8 SECONDS
VALID INTERNAL CONTROL?: YES

## 2019-02-18 NOTE — PROGRESS NOTES
Tim Sow is a 80 y.o. female who presents today for Anticoagulation monitoring. Indication: A-Fib INR Goal: 2.0-3.0 Current dose:  Coumadin 7.5 on Monday, Wednesday and Friday. Take 5mg all other days. Missed Coumadin Doses: none Medication Changes: None Dietary Changes: None Symptoms: taking coumadin appropriately Latest INRs: 
Lab Results Component Value Date/Time INR 3.1 (H) 04/23/2014 11:30 AM  
 INR (POC) 1.4 (H) 02/16/2018 11:40 AM  
 INR, External 1.9 04/21/2015 12:48 PM  
 INR POC 2.1 01/21/2019 01:14 PM  
 INR POC 2.4 12/21/2018 10:12 AM  
 INR POC 2.5 11/23/2018 09:20 AM  
 Prothrombin time 31.6 (H) 04/23/2014 11:30 AM  
 
  
Results for orders placed or performed in visit on 02/18/19 AMB POC PT/INR Result Value Ref Range VALID INTERNAL CONTROL POC Yes Prothrombin time (POC) 29.8 seconds INR POC 2.5 New Coumadin dose:. No Changes Next check to be scheduled on 3/18/19

## 2019-03-18 ENCOUNTER — CLINICAL SUPPORT (OUTPATIENT)
Dept: FAMILY MEDICINE CLINIC | Age: 84
End: 2019-03-18

## 2019-03-18 VITALS
TEMPERATURE: 97.9 F | SYSTOLIC BLOOD PRESSURE: 154 MMHG | RESPIRATION RATE: 12 BRPM | OXYGEN SATURATION: 98 % | HEART RATE: 75 BPM | BODY MASS INDEX: 30.63 KG/M2 | HEIGHT: 60 IN | WEIGHT: 156 LBS | DIASTOLIC BLOOD PRESSURE: 88 MMHG

## 2019-03-18 DIAGNOSIS — I48.0 PAROXYSMAL ATRIAL FIBRILLATION (HCC): Primary | ICD-10-CM

## 2019-03-18 LAB
INR BLD: 1.9
PT POC: 23.2 SECONDS
VALID INTERNAL CONTROL?: YES

## 2019-03-18 NOTE — PROGRESS NOTES
Quintin Tillman is a 80 y.o. female who presents today for Anticoagulation monitoring. Indication: A-Fib  INR Goal: 2.0-3.0  Current dose:  Coumadin  7.5 mg on Monday, Wednesday and Friday. Taking 5 mg all other days. Missed Coumadin Doses:  None  Medication Changes: None  Dietary Changes: None  Symptoms: taking coumadin appropriately  Results for orders placed or performed in visit on 03/18/19   AMB POC PT/INR   Result Value Ref Range    VALID INTERNAL CONTROL POC Yes     Prothrombin time (POC) 23.2 seconds    INR POC 1.9        Latest INRs:  Lab Results   Component Value Date/Time    INR 3.1 (H) 04/23/2014 11:30 AM    INR (POC) 1.4 (H) 02/16/2018 11:40 AM    INR, External 1.9 04/21/2015 12:48 PM    INR POC 2.5 02/18/2019 10:25 AM    INR POC 2.1 01/21/2019 01:14 PM    INR POC 2.4 12/21/2018 10:12 AM    Prothrombin time 31.6 (H) 04/23/2014 11:30 AM        New Coumadin dose: continue current dose of medication. Next check to be scheduled for 2 weeks.

## 2019-04-01 ENCOUNTER — CLINICAL SUPPORT (OUTPATIENT)
Dept: FAMILY MEDICINE CLINIC | Age: 84
End: 2019-04-01

## 2019-04-01 VITALS
DIASTOLIC BLOOD PRESSURE: 64 MMHG | HEART RATE: 68 BPM | RESPIRATION RATE: 12 BRPM | OXYGEN SATURATION: 98 % | TEMPERATURE: 97.8 F | SYSTOLIC BLOOD PRESSURE: 162 MMHG | BODY MASS INDEX: 31.8 KG/M2 | HEIGHT: 60 IN | WEIGHT: 162 LBS

## 2019-04-01 DIAGNOSIS — Z79.01 ANTICOAGULATION MONITORING, INR RANGE 2-3: Primary | ICD-10-CM

## 2019-04-01 DIAGNOSIS — I48.0 PAROXYSMAL ATRIAL FIBRILLATION (HCC): ICD-10-CM

## 2019-04-01 LAB
INR BLD: 1.7
PT POC: 20.5 SECONDS
VALID INTERNAL CONTROL?: YES

## 2019-04-01 NOTE — PROGRESS NOTES
Desirae Richard is a 80 y.o. female who presents today for Anticoagulation monitoring. Indication: A-Fib INR Goal: 2-3 Current dose:  Coumadin 7.5 mg on Monday, Wednesday and Friday. Taking 5 mg all other days. Missed Coumadin Doses:  None Medication Changes: No 
Dietary Changes:  No 
 
Symptoms: taking coumadin appropriately Results for orders placed or performed in visit on 04/01/19 AMB POC PT/INR Result Value Ref Range VALID INTERNAL CONTROL POC Yes Prothrombin time (POC) 20.5 seconds INR POC 1.7 Latest INRs: 
Lab Results Component Value Date/Time INR 3.1 (H) 04/23/2014 11:30 AM  
 INR (POC) 1.4 (H) 02/16/2018 11:40 AM  
 INR, External 1.9 04/21/2015 12:48 PM  
 INR POC 1.9 03/18/2019 10:52 AM  
 INR POC 2.5 02/18/2019 10:25 AM  
 INR POC 2.1 01/21/2019 01:14 PM  
 Prothrombin time 31.6 (H) 04/23/2014 11:30 AM  
 
  
New Coumadin dose: take 7.5 mg on Sunday, Monday, Wednesday and Friday. Take 5 mg on Tuesday and Thursday. Next check to be scheduled for  2 weeks.

## 2019-04-01 NOTE — PATIENT INSTRUCTIONS
New Coumadin dose: take 7.5 mg on Sunday, Monday, Wednesday and Friday. Take 5 mg on Tuesday and Thursday.

## 2019-04-10 RX ORDER — DOXAZOSIN 8 MG/1
TABLET ORAL
Qty: 90 TAB | Refills: 3 | Status: SHIPPED | OUTPATIENT
Start: 2019-04-10 | End: 2020-07-07

## 2019-04-10 NOTE — TELEPHONE ENCOUNTER
Requested Prescriptions     Pending Prescriptions Disp Refills    doxazosin (CARDURA) 8 mg tablet 90 Tab 3     Sig: Take 0.5 tablet twice daily.      Future Appointments:  4/15/2019  9:50 AM Leigha Mortensen MD   5/10/2019  9:20 AM Leigha Mortensen MD   Last Appointment With Me:  [phone] with  [facility]

## 2019-04-10 NOTE — TELEPHONE ENCOUNTER
Pt is requesting a refill on her med    Requested Prescriptions     Pending Prescriptions Disp Refills    doxazosin (CARDURA) 8 mg tablet 90 Tab 3     Sig: Take 0.5 tablet twice daily.

## 2019-04-15 ENCOUNTER — CLINICAL SUPPORT (OUTPATIENT)
Dept: FAMILY MEDICINE CLINIC | Age: 84
End: 2019-04-15

## 2019-04-15 VITALS
HEART RATE: 88 BPM | BODY MASS INDEX: 30.67 KG/M2 | HEIGHT: 60 IN | WEIGHT: 156.2 LBS | SYSTOLIC BLOOD PRESSURE: 121 MMHG | RESPIRATION RATE: 16 BRPM | TEMPERATURE: 98.3 F | DIASTOLIC BLOOD PRESSURE: 66 MMHG | OXYGEN SATURATION: 97 %

## 2019-04-15 DIAGNOSIS — I48.20 CHRONIC ATRIAL FIBRILLATION (HCC): Primary | ICD-10-CM

## 2019-04-15 LAB
INR BLD: 2.5
PT POC: 29.5 SECONDS
VALID INTERNAL CONTROL?: YES

## 2019-04-15 NOTE — PROGRESS NOTES
Chief Complaint   Patient presents with    Coagulation disorder     Chai Quintana is a 80 y.o. female who presents today for Anticoagulation monitoring. Indication: Atrial Fibrillation  INR Goal: 2.0-3.0. Current dose:  Coumadin 5 mg daily, except Sundays, Mondays, Wednesdays, and Fridays 7.5 mg.  Missed Coumadin Doses:  None  Medication Changes:  no  Dietary Changes:  no    Symptoms: taking coumadin appropriately without any bleeding. Latest INRs:  Lab Results   Component Value Date/Time    INR 3.1 (H) 04/23/2014 11:30 AM    INR (POC) 1.4 (H) 02/16/2018 11:40 AM    INR, External 1.9 04/21/2015 12:48 PM    INR POC 1.7 04/01/2019 09:47 AM    INR POC 1.9 03/18/2019 10:52 AM    INR POC 2.5 02/18/2019 10:25 AM    Prothrombin time 31.6 (H) 04/23/2014 11:30 AM        New Coumadin dose:.current treatment plan is effective, no change in therapy per Dr Ed Booth. Next check to be scheduled for  2 weeks per Dr. Ed Booth.

## 2019-04-29 ENCOUNTER — CLINICAL SUPPORT (OUTPATIENT)
Dept: FAMILY MEDICINE CLINIC | Age: 84
End: 2019-04-29

## 2019-04-29 VITALS
HEIGHT: 60 IN | HEART RATE: 74 BPM | DIASTOLIC BLOOD PRESSURE: 62 MMHG | RESPIRATION RATE: 12 BRPM | BODY MASS INDEX: 30.43 KG/M2 | WEIGHT: 155 LBS | OXYGEN SATURATION: 98 % | SYSTOLIC BLOOD PRESSURE: 170 MMHG | TEMPERATURE: 97.9 F

## 2019-04-29 DIAGNOSIS — I48.91 ATRIAL FIBRILLATION, UNSPECIFIED TYPE (HCC): Primary | ICD-10-CM

## 2019-04-29 LAB
INR BLD: 2
PT POC: 24.3 SECONDS
VALID INTERNAL CONTROL?: YES

## 2019-04-29 NOTE — PROGRESS NOTES
Kristen Sandhoff is a 80 y.o. female who presents today for Anticoagulation monitoring. Indication: A-Fib INR Goal: 2-3 Current dose:  Coumadin 7.5 mg on Sunday, Monday, Wednesday and Friday. Take 5 mg on Tuesday and Thursday. Missed Coumadin Doses:  None Medication Changes: None Dietary Changes: None Symptoms: taking coumadin appropriately Latest INRs: 
Lab Results Component Value Date/Time INR 3.1 (H) 04/23/2014 11:30 AM  
 INR (POC) 1.4 (H) 02/16/2018 11:40 AM  
 INR, External 1.9 04/21/2015 12:48 PM  
 INR POC 2.5 04/15/2019 10:05 AM  
 INR POC 1.7 04/01/2019 09:47 AM  
 INR POC 1.9 03/18/2019 10:52 AM  
 Prothrombin time 31.6 (H) 04/23/2014 11:30 AM  
 
  
New Coumadin dose: continue taking current dose of coumadin. Next check to be scheduled for 1 month.

## 2019-05-28 ENCOUNTER — CLINICAL SUPPORT (OUTPATIENT)
Dept: FAMILY MEDICINE CLINIC | Age: 84
End: 2019-05-28

## 2019-05-28 VITALS
BODY MASS INDEX: 29.88 KG/M2 | SYSTOLIC BLOOD PRESSURE: 135 MMHG | DIASTOLIC BLOOD PRESSURE: 70 MMHG | WEIGHT: 152.2 LBS | HEART RATE: 75 BPM | TEMPERATURE: 98.2 F | HEIGHT: 60 IN | RESPIRATION RATE: 14 BRPM | OXYGEN SATURATION: 98 %

## 2019-05-28 DIAGNOSIS — I48.91 ATRIAL FIBRILLATION, UNSPECIFIED TYPE (HCC): Primary | ICD-10-CM

## 2019-05-28 LAB
INR BLD: 2.6
PT POC: 31 SECONDS
VALID INTERNAL CONTROL?: YES

## 2019-05-28 NOTE — PROGRESS NOTES
Chief Complaint   Patient presents with   Mata Hirsch is a 80 y.o. female who presents today for Anticoagulation monitoring. Indication: Atrial Fibrillation  INR Goal: 2.0-3.0. Current dose:  Coumadin 7.5 mg daily, except Tuesdays, Thursdays, and Saturdays 5 mg. Missed Coumadin Doses:  None  Medication Changes:  no  Dietary Changes:  no    Symptoms: taking coumadin appropriately without any bleeding. Latest INRs:  Lab Results   Component Value Date/Time    INR 3.1 (H) 04/23/2014 11:30 AM    INR (POC) 1.4 (H) 02/16/2018 11:40 AM    INR, External 1.9 04/21/2015 12:48 PM    INR POC 2.0 04/29/2019 09:30 AM    INR POC 2.5 04/15/2019 10:05 AM    INR POC 1.7 04/01/2019 09:47 AM    Prothrombin time 31.6 (H) 04/23/2014 11:30 AM        New Coumadin dose:.current treatment plan is effective, no change in therapy. Next check to be scheduled for  4 weeks.

## 2019-05-31 ENCOUNTER — HOSPITAL ENCOUNTER (OUTPATIENT)
Dept: LAB | Age: 84
Discharge: HOME | End: 2019-05-31
Payer: MEDICARE

## 2019-05-31 ENCOUNTER — OFFICE VISIT (OUTPATIENT)
Dept: FAMILY MEDICINE CLINIC | Age: 84
End: 2019-05-31

## 2019-05-31 VITALS
WEIGHT: 151 LBS | DIASTOLIC BLOOD PRESSURE: 71 MMHG | TEMPERATURE: 98.7 F | HEART RATE: 77 BPM | SYSTOLIC BLOOD PRESSURE: 147 MMHG | HEIGHT: 60 IN | BODY MASS INDEX: 29.64 KG/M2 | OXYGEN SATURATION: 97 %

## 2019-05-31 DIAGNOSIS — I48.0 PAROXYSMAL ATRIAL FIBRILLATION (HCC): ICD-10-CM

## 2019-05-31 DIAGNOSIS — E03.9 HYPOTHYROIDISM, UNSPECIFIED TYPE: ICD-10-CM

## 2019-05-31 DIAGNOSIS — Z00.00 ROUTINE GENERAL MEDICAL EXAMINATION AT A HEALTH CARE FACILITY: Primary | ICD-10-CM

## 2019-05-31 DIAGNOSIS — I10 ESSENTIAL HYPERTENSION: ICD-10-CM

## 2019-05-31 DIAGNOSIS — E78.5 HYPERLIPIDEMIA, UNSPECIFIED HYPERLIPIDEMIA TYPE: ICD-10-CM

## 2019-05-31 DIAGNOSIS — I27.20 PULMONARY HYPERTENSION (HCC): ICD-10-CM

## 2019-05-31 PROCEDURE — 80053 COMPREHEN METABOLIC PANEL: CPT

## 2019-05-31 PROCEDURE — 36415 COLL VENOUS BLD VENIPUNCTURE: CPT

## 2019-05-31 PROCEDURE — 84443 ASSAY THYROID STIM HORMONE: CPT

## 2019-05-31 PROCEDURE — 85025 COMPLETE CBC W/AUTO DIFF WBC: CPT

## 2019-05-31 PROCEDURE — 80061 LIPID PANEL: CPT

## 2019-05-31 NOTE — PROGRESS NOTES
Medicare Annual Wellness Visit    I have reviewed the patient's medical history in detail and updated the computerized patient record. History   Noah De La Rosa is a 80 y.o. female who presents for follow-up of chronic medical issues. Accompanied by her daughter today. They are a Duo. 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 Used to have a large dog kennel but ran out of dogs and now uses the space to take care of cats. She does not have a preference. Notices that when her blood pressure gets less than 934 systolic she will get dizzy particularly with standing. She does not feel unsteady on her feet on a regular basis. She does not feel the need to use a walker. She has atrial fibrillation, followed by cardiology Dr. Yevgeniy Damon. 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 90 Tab 3    amLODIPine (NORVASC) 5 mg tablet Take 1 Tab by mouth daily. 90 Tab 3    warfarin (COUMADIN) 5 mg tablet TAKE 1 & 1/2 TABLETS BY MOUTH EVERY WEDNESDAY & SUNDAY & TAKE 1 TABLET BY MOUTH ALL OTHER DAYS FOR CLOTS (Patient taking differently: Take 1 & 1/2 tablets every Sunday, Monday, Wednesday and Friday. Take 5 mg on Tuesday and Thursday.) 180 Tab 3    hydroCHLOROthiazide (HYDRODIURIL) 25 mg tablet TAKE 1 TABLET BY MOUTH DAILY.  90 Tab 3    levothyroxine (SYNTHROID) 50 mcg tablet TAKE 1 TABLET BY MOUTH EVERY MORNING BEFORE BREAKFAST FOR THYROID 90 Tab 3    flecainide (TAMBOCOR) 100 mg tablet Take 1 Tab by mouth three (3) times daily. 270 Tab 1     No Known Allergies  Family History   Problem Relation Age of Onset    Cancer Mother      Social History     Tobacco Use    Smoking status: Never Smoker    Smokeless tobacco: Never Used   Substance Use Topics    Alcohol use: No     Patient Active Problem List   Diagnosis Code    Hypertension I10    Atrial fibrillation (HCC) I48.91    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    Hypothyroid E03.9    Hyperlipidemia E78.5    Pulmonary hypertension ([address] I27.20    Widened pulse pressure R09.89       Depression Risk Factor Screening:     3 most recent PHQ Screens 5/31/2019   Little interest or pleasure in doing things Not at all   Feeling down, depressed, irritable, or hopeless Not at all   Total Score PHQ 2 0     Alcohol Risk Factor Screening: On any occasion during the past 3 months, have you had more than 3 drinks containing alcohol? No    Do you average more than 7 drinks per week? No      Functional Ability and Level of Safety:     Hearing Loss   none    Activities of Daily Living   Self-care. Requires assistance with: no ADLs    Fall Risk     Fall Risk Assessment, last 12 mths 5/31/2019   Able to walk? Yes   Fall in past 12 months? No   Fall with injury? -   Number of falls in past 12 months -   Fall Risk Score -     Abuse Screen   Patient is not abused    Review of Systems   ROS:  As listed in HPI.  In addition:  Constitutional:   No headache, fever, fatigue, weight loss or weight gain      Eyes:   No redness, pruritis, pain, visual changes, swelling, or discharge      Ears:    No pain, loss or changes in hearing     Cardiac:    No chest pain      Resp:   No cough or shortness of breath      Neuro   No loss of consciousness, dizziness, seizure    Physical Examination     Evaluation of Cognitive Function:  Mood/affect: happy  Appearance: age appropriate  Family member/caregiver input: Daughter is with her and feels like everything is going okay    Physical Exam:  Blood pressure 147/71, pulse 77, temperature 98.7 °F (37.1 °C), temperature source Oral, height 5' (1.524 m), weight 151 lb (68.5 kg), SpO2 97 %. GEN: No apparent distress. Alert and oriented and responds to all questions appropriately. EYES:  Conjunctiva clear; pupils round and reactive to light; extraocular movements are intact. EAR: External ears are normal.  Tympanic membranes are clear and without effusion. NOSE: Turbinates are within normal limits. No drainage  OROPHYARYNX: No oral lesions or exudates. NECK:  Supple; no masses; thyroid normal.  Left-sided carotid bruit       LUNGS: Respirations unlabored; clear to auscultation bilaterally  CARDIOVASCULAR: Regular, rate, and rhythm without murmurs   ABDOMEN: Soft; nontender; nondistended; normoactive bowel sounds; no masses or organomegaly  NEUROLOGIC:  No focal neurologic deficits. Strength and sensation grossly intact. Coordination and gait grossly intact. EXT: Well perfused. No edema. SKIN: No obvious rashes. Patient Care Team:  Joana Purcell MD as PCP - General (Internal Medicine)  David Cerda MD (Cardiology)    Advice/Referrals/Counseling   Education and counseling provided:    Suggested new shingles vaccine    Deferred ACP discussion    Assessment/Plan     Hypertension  Well controlled for her age, systolic blood pressure less than 130 she will get dizzy  Norvasc 5 mg  Hydrochlorothiazide 25 mg    Hypothyroid  TSH  Synthroid 50 mcg    Atrial fibrillation  Cannot remember the last time she had a rapid heart rate. Appears to be asymptomatic  Flecainide  Coumadin  Dr. Fred Dorantes    6 mo fu      ICD-10-CM ICD-9-CM    1. Routine general medical examination at a health care facility Z00.00 V70.0    2.  Essential hypertension K35 646.4 METABOLIC PANEL, COMPREHENSIVE      CBC WITH AUTOMATED DIFF 3. Paroxysmal atrial fibrillation (HCC) I48.0 427.31    4. Pulmonary hypertension (HCC) I27.20 416.8    5. Hypothyroidism, unspecified type E03.9 244.9 TSH 3RD GENERATION   6.  Hyperlipidemia, unspecified hyperlipidemia type E78.5 272.4 LIPID PANEL

## 2019-05-31 NOTE — PROGRESS NOTES
.  Chief Complaint   Patient presents with    Follow-up     . 1. Have you been to the ER, urgent care clinic since your last visit? Hospitalized since your last visit? no    2. Have you seen or consulted any other health care providers outside of the [address] [place] [address] since your last visit? Include any pap smears or colon screening. No    .  Health Maintenance Due   Topic Date Due    Shingrix Vaccine Age 49> (1 of 2) 10/29/1981    GLAUCOMA SCREENING Q2Y  05/27/2017    MEDICARE YEARLY EXAM  03/31/2019     . Karishma Ledesma

## 2019-06-01 LAB
ALBUMIN SERPL-MCNC: 4.5 G/DL (ref 3.5–4.7)
ALBUMIN/GLOB SERPL: 1.8 {RATIO} (ref 1.2–2.2)
ALP SERPL-CCNC: 60 IU/L (ref 39–117)
ALT SERPL-CCNC: 11 IU/L (ref 0–32)
AST SERPL-CCNC: 22 IU/L (ref 0–40)
BASOPHILS # BLD AUTO: 0 X10E3/UL (ref 0–0.2)
BASOPHILS NFR BLD AUTO: 0 %
BILIRUB SERPL-MCNC: 0.4 MG/DL (ref 0–1.2)
BUN SERPL-MCNC: 21 MG/DL (ref 8–27)
BUN/CREAT SERPL: 22 (ref 12–28)
CALCIUM SERPL-MCNC: 9.9 MG/DL (ref 8.7–10.3)
CHLORIDE SERPL-SCNC: 100 MMOL/L (ref 96–106)
CHOLEST SERPL-MCNC: 219 MG/DL (ref 100–199)
CO2 SERPL-SCNC: 26 MMOL/L (ref 20–29)
CREAT SERPL-MCNC: 0.97 MG/DL (ref 0.57–1)
EOSINOPHIL # BLD AUTO: 0.1 X10E3/UL (ref 0–0.4)
EOSINOPHIL NFR BLD AUTO: 2 %
ERYTHROCYTE [DISTWIDTH] IN BLOOD BY AUTOMATED COUNT: 13.7 % (ref 12.3–15.4)
GLOBULIN SER CALC-MCNC: 2.5 G/DL (ref 1.5–4.5)
GLUCOSE SERPL-MCNC: 92 MG/DL (ref 65–99)
HCT VFR BLD AUTO: 36.8 % (ref 34–46.6)
HDLC SERPL-MCNC: 79 MG/DL
HGB BLD-MCNC: 12 G/DL (ref 11.1–15.9)
IMM GRANULOCYTES # BLD AUTO: 0 X10E3/UL (ref 0–0.1)
IMM GRANULOCYTES NFR BLD AUTO: 0 %
INTERPRETATION, 910389: NORMAL
INTERPRETATION: NORMAL
LDLC SERPL CALC-MCNC: 121 MG/DL (ref 0–99)
LYMPHOCYTES # BLD AUTO: 1.9 X10E3/UL (ref 0.7–3.1)
LYMPHOCYTES NFR BLD AUTO: 30 %
MCH RBC QN AUTO: 30.7 PG (ref 26.6–33)
MCHC RBC AUTO-ENTMCNC: 32.6 G/DL (ref 31.5–35.7)
MCV RBC AUTO: 94 FL (ref 79–97)
MONOCYTES # BLD AUTO: 0.6 X10E3/UL (ref 0.1–0.9)
MONOCYTES NFR BLD AUTO: 10 %
NEUTROPHILS # BLD AUTO: 3.6 X10E3/UL (ref 1.4–7)
NEUTROPHILS NFR BLD AUTO: 58 %
PDF IMAGE, 910387: NORMAL
PLATELET # BLD AUTO: 216 X10E3/UL (ref 150–450)
POTASSIUM SERPL-SCNC: 4.1 MMOL/L (ref 3.5–5.2)
PROT SERPL-MCNC: 7 G/DL (ref 6–8.5)
RBC # BLD AUTO: 3.91 X10E6/UL (ref 3.77–5.28)
SODIUM SERPL-SCNC: 141 MMOL/L (ref 134–144)
TRIGL SERPL-MCNC: 95 MG/DL (ref 0–149)
TSH SERPL DL<=0.005 MIU/L-ACNC: 1.63 UIU/ML (ref 0.45–4.5)
VLDLC SERPL CALC-MCNC: 19 MG/DL (ref 5–40)
WBC # BLD AUTO: 6.2 X10E3/UL (ref 3.4–10.8)

## 2019-06-13 ENCOUNTER — OFFICE VISIT (OUTPATIENT)
Dept: FAMILY MEDICINE CLINIC | Age: 84
End: 2019-06-13

## 2019-06-13 VITALS
OXYGEN SATURATION: 97 % | HEART RATE: 74 BPM | TEMPERATURE: 98.3 F | HEIGHT: 60 IN | RESPIRATION RATE: 17 BRPM | DIASTOLIC BLOOD PRESSURE: 56 MMHG | WEIGHT: 153 LBS | BODY MASS INDEX: 30.04 KG/M2 | SYSTOLIC BLOOD PRESSURE: 121 MMHG

## 2019-06-13 DIAGNOSIS — H01.005 BLEPHARITIS OF LEFT LOWER EYELID, UNSPECIFIED TYPE: Primary | ICD-10-CM

## 2019-06-13 RX ORDER — ERYTHROMYCIN 5 MG/G
OINTMENT OPHTHALMIC
Qty: 3.5 G | Refills: 0 | Status: SHIPPED | OUTPATIENT
Start: 2019-06-13 | End: 2019-09-17 | Stop reason: ALTCHOICE

## 2019-06-13 RX ORDER — LEVOTHYROXINE SODIUM 50 UG/1
TABLET ORAL
Qty: 90 TAB | Refills: 3 | Status: SHIPPED | OUTPATIENT
Start: 2019-06-13 | End: 2020-09-01

## 2019-06-13 RX ORDER — DOXYCYCLINE 100 MG/1
100 CAPSULE ORAL 2 TIMES DAILY
Qty: 20 CAP | Refills: 0 | Status: SHIPPED | OUTPATIENT
Start: 2019-06-13 | End: 2019-06-23

## 2019-06-13 NOTE — TELEPHONE ENCOUNTER
Pt is needing her med she is here if she can get she is out of med states pharmacy has requested we have nothing    Requested Prescriptions     Pending Prescriptions Disp Refills    levothyroxine (SYNTHROID) 50 mcg tablet 90 Tab 3

## 2019-06-13 NOTE — PROGRESS NOTES
Chief Complaint   Patient presents with    Eye Problem    Eye Swelling     left     Eye Pain     left      Health Maintenance reviewed     1. Have you been to the ER, urgent care clinic since your last visit? Hospitalized since your last visit? No    2. Have you seen or consulted any other health care providers outside of the [address] [place] [address] since your last visit? Include any pap smears or colon screening.  No

## 2019-06-13 NOTE — PROGRESS NOTES
Chief Complaint   Patient presents with    Eye Problem    Eye Swelling     left     Eye Pain     left      Pt reports that her right lower eyelid was inflamed last week, resolved on its own after 3 days. Pt reports that her left eye then became irritated about a week later. Pt reports that it has gotten worse instead of better, she reports crusting and irritation. Pain is helped by warm compress. Subjective: (As above and below)     Chief Complaint   Patient presents with    Eye Problem    Eye Swelling     left     Eye Pain     left      she is a 80y.o. year old female who presents for evaluation. Reviewed PmHx, RxHx, FmHx, SocHx, AllgHx and updated in chart. Review of Systems - negative except as listed above    Objective:     Vitals:    06/13/19 1040   BP: 121/56   Pulse: 74   Resp: 17   Temp: 98.3 °F (36.8 °C)   TempSrc: Oral   SpO2: 97%   Weight: 153 lb (69.4 kg)   Height: 5' (1.524 m)     Physical Examination: General appearance - alert, well appearing, and in no distress  Mental status - normal mood, behavior, speech, dress, motor activity, and thought processes  Eyes - swelling and pustules on the le;ft lower eyelis with draining  Mouth - mucous membranes moist, pharynx normal without lesions  Chest - clear to auscultation, no wheezes, rales or rhonchi, symmetric air entry  Heart - normal rate, regular rhythm, normal S1, S2, no murmurs, rubs, clicks or gallops  Musculoskeletal - no joint tenderness, deformity or swelling    Assessment/ Plan:   1. Blepharitis of left lower eyelid, unspecified type  -take medication as written  - doxycycline (VIBRAMYCIN) 100 mg capsule; Take 1 Cap by mouth two (2) times a day for 10 days. Dispense: 20 Cap; Refill: 0  - erythromycin (ILOTYCIN) ophthalmic ointment; Apply to left eye every 6 hours  Dispense: 3.5 g; Refill: 0     Follow up as needed    I have discussed the diagnosis with the patient and the intended plan as seen in the above orders.   The patient has received an after-visit summary and questions were answered concerning future plans.      Medication Side Effects and Warnings were discussed with patient: yes  Patient Labs were reviewed: yes  Patient Past Records were reviewed:  yes    Abby Euceda M.D.

## 2019-06-25 ENCOUNTER — CLINICAL SUPPORT (OUTPATIENT)
Dept: FAMILY MEDICINE CLINIC | Age: 84
End: 2019-06-25

## 2019-06-25 VITALS
SYSTOLIC BLOOD PRESSURE: 132 MMHG | TEMPERATURE: 98.2 F | DIASTOLIC BLOOD PRESSURE: 61 MMHG | RESPIRATION RATE: 14 BRPM | HEIGHT: 60 IN | HEART RATE: 71 BPM | BODY MASS INDEX: 29.45 KG/M2 | OXYGEN SATURATION: 96 % | WEIGHT: 150 LBS

## 2019-06-25 DIAGNOSIS — I48.20 CHRONIC ATRIAL FIBRILLATION (HCC): Primary | ICD-10-CM

## 2019-06-25 DIAGNOSIS — Z79.01 ANTICOAGULANT LONG-TERM USE: ICD-10-CM

## 2019-06-25 LAB
INR BLD: 3.2
PT POC: 38.9 SECONDS
VALID INTERNAL CONTROL?: YES

## 2019-06-25 NOTE — PROGRESS NOTES
Patient presents for a PT/INR  See med list and patient instructions along with results for verbal orders by Dr. Rosa Steward  Results for orders placed or performed in visit on 06/25/19   AMB POC PT/INR   Result Value Ref Range    VALID INTERNAL CONTROL POC Yes     Prothrombin time (POC) 38.9 seconds    INR POC 3.2         Vitals:    06/25/19 0950   BP: 132/61   Pulse: 71   Resp: 14   Temp: 98.2 °F (36.8 °C)   TempSrc: Oral   SpO2: 96%   Weight: 150 lb (68 kg)   Height: 5' (1.524 m)     Prior to Admission medications    Medication Sig Start Date End Date Taking? Authorizing Provider   levothyroxine (SYNTHROID) 50 mcg tablet TAKE 1 TABLET BY MOUTH EVERY MORNING BEFORE BREAKFAST FOR THYROID 6/13/19  Yes Beverly Parks MD   erythromycin (ILOTYCIN) ophthalmic ointment Apply to left eye every 6 hours 6/13/19  Yes Maday Anderson MD   doxazosin (CARDURA) 8 mg tablet Take 0.5 tablet twice daily. 4/10/19  Yes Beverly Parks MD   amLODIPine (NORVASC) 5 mg tablet Take 1 Tab by mouth daily. 12/28/18  Yes Beverly Parks MD   warfarin (COUMADIN) 5 mg tablet TAKE 1 & 1/2 TABLETS BY MOUTH EVERY WEDNESDAY & SUNDAY & TAKE 1 TABLET BY MOUTH ALL OTHER DAYS FOR CLOTS  Patient taking differently: Take 1 & 1/2 tablets every Sunday, Monday, Wednesday and Friday. Take 5 mg on Tuesday and Thursday. 6/25/18  Yes Bc Marlow MD   hydroCHLOROthiazide (HYDRODIURIL) 25 mg tablet TAKE 1 TABLET BY MOUTH DAILY. 6/13/18  Yes Bc Marlow MD   flecainide (TAMBOCOR) 100 mg tablet Take 1 Tab by mouth three (3) times daily. 12/18/17  Yes Bc Marlow MD     Per Dr. Kitty Das coumadin changed to 7.5 mg Mondays, Wednesdays, fridays, and 5 mg the rest of the week. Return 7/8/19  See anti-coag episode for instructions which are on AVS.    AVS and verbal instructions given she voiced understanding of all instructions.

## 2019-07-08 ENCOUNTER — CLINICAL SUPPORT (OUTPATIENT)
Dept: FAMILY MEDICINE CLINIC | Age: 84
End: 2019-07-08

## 2019-07-08 VITALS
OXYGEN SATURATION: 97 % | WEIGHT: 150 LBS | BODY MASS INDEX: 29.45 KG/M2 | TEMPERATURE: 98 F | SYSTOLIC BLOOD PRESSURE: 112 MMHG | DIASTOLIC BLOOD PRESSURE: 56 MMHG | HEART RATE: 72 BPM | RESPIRATION RATE: 14 BRPM | HEIGHT: 60 IN

## 2019-07-08 DIAGNOSIS — J40 BRONCHITIS: ICD-10-CM

## 2019-07-08 DIAGNOSIS — Z79.01 ANTICOAGULANT LONG-TERM USE: ICD-10-CM

## 2019-07-08 DIAGNOSIS — I48.20 CHRONIC ATRIAL FIBRILLATION (HCC): Primary | ICD-10-CM

## 2019-07-08 LAB
INR BLD: 1.6
PT POC: 18.8 SECONDS
VALID INTERNAL CONTROL?: YES

## 2019-07-08 RX ORDER — HYDROCHLOROTHIAZIDE 25 MG/1
TABLET ORAL
Qty: 90 TAB | Refills: 3 | Status: SHIPPED | OUTPATIENT
Start: 2019-07-08 | End: 2020-07-07

## 2019-07-08 NOTE — TELEPHONE ENCOUNTER
Pt is requesting a refill on her med    Requested Prescriptions     Pending Prescriptions Disp Refills    hydroCHLOROthiazide (HYDRODIURIL) 25 mg tablet 90 Tab 3

## 2019-07-08 NOTE — PROGRESS NOTES
Patient presents for a PT/INR  See med list and patient instructions along with results for verbal orders by Dr. John Moe  Results for orders placed or performed in visit on 07/08/19   AMB POC PT/INR   Result Value Ref Range    VALID INTERNAL CONTROL POC Yes     Prothrombin time (POC) 18.8 seconds    INR POC 1.6         Vitals:    07/08/19 0947   BP: 112/56   Pulse: 72   Resp: 14   Temp: 98 °F (36.7 °C)   TempSrc: Oral   SpO2: 97%   Weight: 150 lb (68 kg)   Height: 5' (1.524 m)     Prior to Admission medications    Medication Sig Start Date End Date Taking? Authorizing Provider   levothyroxine (SYNTHROID) 50 mcg tablet TAKE 1 TABLET BY MOUTH EVERY MORNING BEFORE BREAKFAST FOR THYROID 6/13/19  Yes Jimbo Veliz MD   erythromycin (ILOTYCIN) ophthalmic ointment Apply to left eye every 6 hours 6/13/19  Yes Geremias Anderson MD   doxazosin (CARDURA) 8 mg tablet Take 0.5 tablet twice daily. 4/10/19  Yes Jimbo Veliz MD   amLODIPine (NORVASC) 5 mg tablet Take 1 Tab by mouth daily. 12/28/18  Yes Jimbo Veliz MD   warfarin (COUMADIN) 5 mg tablet TAKE 1 & 1/2 TABLETS BY MOUTH EVERY WEDNESDAY & SUNDAY & TAKE 1 TABLET BY MOUTH ALL OTHER DAYS FOR CLOTS  Patient taking differently: Take 1 & 1/2 tablets every Sunday, Monday, Wednesday and Friday. Take 5 mg on Tuesday and Thursday. 6/25/18  Yes Frankie Dent MD   hydroCHLOROthiazide (HYDRODIURIL) 25 mg tablet TAKE 1 TABLET BY MOUTH DAILY. 6/13/18  Yes Frankie Dent MD   flecainide (TAMBOCOR) 100 mg tablet Take 1 Tab by mouth three (3) times daily. 12/18/17  Yes Frankie Dent MD     Per Dr. John Moe she is to take coumadin 5 mg on Sundays Tuesdays and Thursdays and 7.5 mg the rest of the week. Return in 2 weeks. See anti-coag episode for instructions which are on AVS.    AVS and verbal instructions given she voiced understanding of all instructions.

## 2019-07-22 ENCOUNTER — CLINICAL SUPPORT (OUTPATIENT)
Dept: FAMILY MEDICINE CLINIC | Age: 84
End: 2019-07-22

## 2019-07-22 VITALS
BODY MASS INDEX: 29.45 KG/M2 | SYSTOLIC BLOOD PRESSURE: 136 MMHG | TEMPERATURE: 98.3 F | WEIGHT: 150 LBS | OXYGEN SATURATION: 97 % | RESPIRATION RATE: 14 BRPM | DIASTOLIC BLOOD PRESSURE: 89 MMHG | HEART RATE: 71 BPM | HEIGHT: 60 IN

## 2019-07-22 DIAGNOSIS — I48.20 CHRONIC ATRIAL FIBRILLATION (HCC): Primary | ICD-10-CM

## 2019-07-22 LAB
INR BLD: 1.6
PT POC: 19 SECONDS
VALID INTERNAL CONTROL?: YES

## 2019-08-01 RX ORDER — WARFARIN SODIUM 5 MG/1
TABLET ORAL
Qty: 180 TAB | Refills: 3 | Status: SHIPPED | OUTPATIENT
Start: 2019-08-01 | End: 2020-09-01

## 2019-08-01 NOTE — TELEPHONE ENCOUNTER
Pt is calling requesting a refill on her med will need this done by tomorrow because she will be out    Requested Prescriptions     Pending Prescriptions Disp Refills    warfarin (COUMADIN) 5 mg tablet 180 Tab 3

## 2019-08-05 ENCOUNTER — CLINICAL SUPPORT (OUTPATIENT)
Dept: FAMILY MEDICINE CLINIC | Age: 84
End: 2019-08-05

## 2019-08-05 VITALS
WEIGHT: 150 LBS | BODY MASS INDEX: 29.45 KG/M2 | TEMPERATURE: 98.2 F | OXYGEN SATURATION: 96 % | DIASTOLIC BLOOD PRESSURE: 57 MMHG | HEIGHT: 60 IN | RESPIRATION RATE: 14 BRPM | HEART RATE: 73 BPM | SYSTOLIC BLOOD PRESSURE: 116 MMHG

## 2019-08-05 DIAGNOSIS — Z79.01 ANTICOAGULANT LONG-TERM USE: ICD-10-CM

## 2019-08-05 DIAGNOSIS — I48.20 CHRONIC ATRIAL FIBRILLATION (HCC): Primary | ICD-10-CM

## 2019-08-05 LAB
INR BLD: 2.4
PT POC: 29.1 SECONDS
VALID INTERNAL CONTROL?: YES

## 2019-08-05 NOTE — PROGRESS NOTES
Patient presents for a PT/INR  See med list and patient instructions along with results for verbal orders by Dr. Stephani Ovalles  Results for orders placed or performed in visit on 08/05/19   AMB POC PT/INR   Result Value Ref Range    VALID INTERNAL CONTROL POC Yes     Prothrombin time (POC) 29.1 seconds    INR POC 2.4         Vitals:    08/05/19 0950   BP: 116/57   Pulse: 73   Resp: 14   Temp: 98.2 °F (36.8 °C)   TempSrc: Oral   SpO2: 96%   Weight: 150 lb (68 kg)   Height: 5' (1.524 m)     Prior to Admission medications    Medication Sig Start Date End Date Taking? Authorizing Provider   warfarin (COUMADIN) 5 mg tablet TAKE 1 &amp;amp; 1/2 TABLETS BY MOUTH EVERY WEDNESDAY &amp;amp; SUNDAY &amp;amp; TAKE 1 TABLET BY MOUTH ALL OTHER DAYS FOR CLOTS 8/1/19  Yes Mary Stephens MD   hydroCHLOROthiazide (HYDRODIURIL) 25 mg tablet TAKE 1 TABLET BY MOUTH DAILY. 7/8/19  Yes Mary Stephens MD   levothyroxine (SYNTHROID) 50 mcg tablet TAKE 1 TABLET BY MOUTH EVERY MORNING BEFORE BREAKFAST FOR THYROID 6/13/19  Yes Mary Stephens MD   erythromycin (ILOTYCIN) ophthalmic ointment Apply to left eye every 6 hours 6/13/19  Yes Russ Anderson MD   doxazosin (CARDURA) 8 mg tablet Take 0.5 tablet twice daily. 4/10/19  Yes Mary Stephens MD   amLODIPine (NORVASC) 5 mg tablet Take 1 Tab by mouth daily. 12/28/18  Yes Mary Stephens MD   flecainide [facility] [place]) 100 mg tablet Take 1 Tab by mouth three (3) times daily. 12/18/17  Yes Uday Patino MD     Per Dr. Stephani Ovalles she is to stay on coumadin 5 mg on Tuesdays and Thursdays and 7.5 mg the rest of the week. See anti-coag episode for instructions which are on AVS.    AVS and verbal instructions given she voiced understanding of all instructions.

## 2019-08-19 ENCOUNTER — CLINICAL SUPPORT (OUTPATIENT)
Dept: FAMILY MEDICINE CLINIC | Age: 84
End: 2019-08-19

## 2019-08-19 VITALS
BODY MASS INDEX: 29.33 KG/M2 | SYSTOLIC BLOOD PRESSURE: 137 MMHG | TEMPERATURE: 97.9 F | DIASTOLIC BLOOD PRESSURE: 70 MMHG | RESPIRATION RATE: 16 BRPM | HEART RATE: 66 BPM | HEIGHT: 60 IN | WEIGHT: 149.4 LBS | OXYGEN SATURATION: 98 %

## 2019-08-19 DIAGNOSIS — I48.20 CHRONIC ATRIAL FIBRILLATION (HCC): Primary | ICD-10-CM

## 2019-08-19 LAB
INR BLD: 2.7
PT POC: 31.9 SECONDS
VALID INTERNAL CONTROL?: YES

## 2019-08-19 NOTE — PROGRESS NOTES
Lolly Queen is a 80 y.o. female who presents today for Anticoagulation monitoring. Patient states current dose coumadin is:   Missed Coumadin Doses:  None  Medication Changes:  no  Dietary Changes:  no    Symptoms: without any bleeding. Anticoagulation Summary  As of 2019    INR goal:   2.0-3.0   TTR:   69.7 % (3.9 mo)   INR used for dosin.7 (2019)   Warfarin maintenance plan:   5 mg (5 mg x 1) every e, Thu; 7.5 mg (5 mg x 1.5) all other days   Weekly warfarin total:   47.5 mg   Plan last modified:   Gita Moctezuma MD (2019)   Next INR check:   2019   Target end date:            Anticoagulation Episode Summary     INR check location:   Clinic Lab    Preferred lab:   [facility]    Send INR reminders to:       Comments:             . Mellissa Oneill

## 2019-09-03 ENCOUNTER — CLINICAL SUPPORT (OUTPATIENT)
Dept: FAMILY MEDICINE CLINIC | Age: 84
End: 2019-09-03

## 2019-09-03 VITALS
DIASTOLIC BLOOD PRESSURE: 64 MMHG | HEIGHT: 60 IN | HEART RATE: 68 BPM | OXYGEN SATURATION: 96 % | RESPIRATION RATE: 18 BRPM | WEIGHT: 150 LBS | SYSTOLIC BLOOD PRESSURE: 138 MMHG | BODY MASS INDEX: 29.45 KG/M2 | TEMPERATURE: 98.2 F

## 2019-09-03 DIAGNOSIS — I48.20 CHRONIC ATRIAL FIBRILLATION (HCC): Primary | ICD-10-CM

## 2019-09-03 DIAGNOSIS — Z79.01 ANTICOAGULANT LONG-TERM USE: ICD-10-CM

## 2019-09-03 LAB
INR BLD: 2.9
PT POC: 34.5 SECONDS
VALID INTERNAL CONTROL?: YES

## 2019-09-03 NOTE — PROGRESS NOTES
Ms. Marshall Espinal is here today for anticoagulation monitoring for the diagnosis of Atrial Fibrillation. Her INR goal is 2.0-3.0 and her current Coumadin dose is 5 mg on Tuesdays and Thursdays and 7.5 mg the rest of the week. Today's findings include an INR of 2.9 (normal INR range 0.8-1.2) . Considering Ms. Caicedo's past history, todays findings, and per the coumadin policy/protocol, Ms. Caicedo was instructed to take Coumadin as follows,5 mg on Tuesdays and Thursdays and 7.5 mg the rest of the week. Remigio Guzman She was also instructed to schedule an appointment in 4 weeks prior to leaving for an INR check. A full discussion of the nature of anticoagulants has been carried out. A full discussion of the need for frequent and regular monitoring, precise dosage adjustment and compliance was stressed. Side effects of potential bleeding were discussed and Ms. Caicedo was instructed to call [phone] if there are any signs of abnormal bleeding. Ms. Hill Lozoya was instructed to avoid any OTC items containing aspirin or ibuprofen and prior to starting any new OTC products to consult with her physician or pharmacist to ensure no drug interactions are present. Ms. Hill Lozoya was instructed to avoid any major changes in her general diet and to avoid alcohol consumption. .    Ms. Caicedo was provided a literature booklet, \"Treatment with Warfarin (Coumadin)\", that includes topics on understanding coumadin therapy, drug interaction considerations, vitamin K and coumadin use, interactions with foods and supplements containing vitamin K, and the use of herbal products. Ms. Hill Lozoya verbalized her understanding of all instructions and will call the office with any questions, concerns, or signs of abnormal bleeding or blood clot.

## 2019-09-17 ENCOUNTER — CLINICAL SUPPORT (OUTPATIENT)
Dept: FAMILY MEDICINE CLINIC | Age: 84
End: 2019-09-17

## 2019-09-17 VITALS
TEMPERATURE: 98.6 F | HEIGHT: 60 IN | HEART RATE: 68 BPM | DIASTOLIC BLOOD PRESSURE: 61 MMHG | WEIGHT: 152 LBS | RESPIRATION RATE: 16 BRPM | BODY MASS INDEX: 29.84 KG/M2 | SYSTOLIC BLOOD PRESSURE: 120 MMHG | OXYGEN SATURATION: 97 %

## 2019-09-17 DIAGNOSIS — Z79.01 ANTICOAGULANT LONG-TERM USE: ICD-10-CM

## 2019-09-17 DIAGNOSIS — I48.20 CHRONIC ATRIAL FIBRILLATION (HCC): Primary | ICD-10-CM

## 2019-09-17 LAB
INR BLD: 2.6
PT POC: 31.5 SECONDS
VALID INTERNAL CONTROL?: YES

## 2019-09-17 NOTE — PROGRESS NOTES
Ms. Ceci Barron is here today for anticoagulation monitoring for the diagnosis of Atrial Fibrillation. Her INR goal is 2.0-3.0 and her current Coumadin dose is 5 mg Tuesdays and Thursdays and 7.5 mg the rest of the week. Today's findings include an INR of 2.6 (normal INR range 0.8-1.2). Considering Ms. Caicedo's past history, todays findings, and per the coumadin policy/protocol, Ms. Caicedo was instructed to take Coumadin as follows,   5 mg Tuesdays and Thursdays and 7.5 mg the rest of the week. Faraz Stock She was also instructed to schedule an appointment in 4 weeks prior to leaving for an INR check. A full discussion of the nature of anticoagulants has been carried out. A full discussion of the need for frequent and regular monitoring, precise dosage adjustment and compliance was stressed. Side effects of potential bleeding were discussed and Ms. Caicedo was instructed to call [phone] if there are any signs of abnormal bleeding. Ms. Judi Carlton was instructed to avoid any OTC items containing aspirin or ibuprofen and prior to starting any new OTC products to consult with her physician or pharmacist to ensure no drug interactions are present. Ms. Judi Carlton was instructed to avoid any major changes in her general diet and to avoid alcohol consumption. .    Ms. Caicedo was provided a literature booklet, \"Treatment with Warfarin (Coumadin)\", that includes topics on understanding coumadin therapy, drug interaction considerations, vitamin K and coumadin use, interactions with foods and supplements containing vitamin K, and the use of herbal products. Ms. Judi Carlton verbalized her understanding of all instructions and will call the office with any questions, concerns, or signs of abnormal bleeding or blood clot.

## 2019-10-15 ENCOUNTER — CLINICAL SUPPORT (OUTPATIENT)
Dept: FAMILY MEDICINE CLINIC | Age: 84
End: 2019-10-15

## 2019-10-15 VITALS
HEART RATE: 76 BPM | HEIGHT: 60 IN | OXYGEN SATURATION: 94 % | DIASTOLIC BLOOD PRESSURE: 67 MMHG | SYSTOLIC BLOOD PRESSURE: 135 MMHG | TEMPERATURE: 98 F | WEIGHT: 152.6 LBS | RESPIRATION RATE: 16 BRPM | BODY MASS INDEX: 29.96 KG/M2

## 2019-10-15 DIAGNOSIS — I48.20 CHRONIC ATRIAL FIBRILLATION (HCC): Primary | ICD-10-CM

## 2019-10-15 DIAGNOSIS — Z23 ENCOUNTER FOR IMMUNIZATION: ICD-10-CM

## 2019-10-15 LAB
INR BLD: 2.6
PT POC: 31.8 SECONDS
VALID INTERNAL CONTROL?: YES

## 2019-10-15 NOTE — PATIENT INSTRUCTIONS
Vaccine Information Statement    Influenza (Flu) Vaccine (Inactivated or Recombinant): What You Need to Know    Many Vaccine Information Statements are available in Arabic and other languages. See www.immunize.org/vis  Hojas de información sobre vacunas están disponibles en español y en muchos otros idiomas. Visite www.immunize.org/vis    1. Why get vaccinated? Influenza vaccine can prevent influenza (flu). Flu is a contagious disease that spreads around the [place] [facility] every year, usually between October and May. Anyone can get the flu, but it is more dangerous for some people. Infants and young children, people 72years of age and older, pregnant women, and people with certain health conditions or a weakened immune system are at greatest risk of flu complications. Pneumonia, bronchitis, sinus infections and ear infections are examples of flu-related complications. If you have a medical condition, such as heart disease, cancer or diabetes, flu can make it worse. Flu can cause fever and chills, sore throat, muscle aches, fatigue, cough, headache, and runny or stuffy nose. Some people may have vomiting and diarrhea, though this is more common in children than adults. Each year thousands of people in the [facility] die from flu, and many more are hospitalized. Flu vaccine prevents millions of illnesses and flu-related visits to the doctor each year. 2. Influenza vaccines     CDC recommends everyone 10months of age and older get vaccinated every flu season. Children 6 months through 6years of age may need 2 doses during a single flu season. Everyone else needs only 1 dose each flu season. It takes about 2 weeks for protection to develop after vaccination. There are many flu viruses, and they are always changing. Each year a new flu vaccine is made to protect against three or four viruses that are likely to cause disease in the upcoming flu season.  Even when the vaccine doesnt exactly match these viruses, it may still provide some protection. Influenza vaccine does not cause flu. Influenza vaccine may be given at the same time as other vaccines. 3. Talk with your health care provider    Tell your vaccine provider if the person getting the vaccine:   Has had an allergic reaction after a previous dose of influenza vaccine, or has any severe, life-threatening allergies.  Has ever had Guillain-Barré Syndrome (also called GBS). In some cases, your health care provider may decide to postpone influenza vaccination to a future visit. People with minor illnesses, such as a cold, may be vaccinated. People who are moderately or severely ill should usually wait until they recover before getting influenza vaccine. Your health care provider can give you more information. 4. Risks of a reaction     Soreness, redness, and swelling where shot is given, fever, muscle aches, and headache can happen after influenza vaccine.  There may be a very small increased risk of Guillain-Barré Syndrome (GBS) after inactivated influenza vaccine (the flu shot). Zachary Lyn children who get the flu shot along with pneumococcal vaccine (PCV13), and/or DTaP vaccine at the same time might be slightly more likely to have a seizure caused by fever. Tell your health care provider if a child who is getting flu vaccine has ever had a seizure. People sometimes faint after medical procedures, including vaccination. Tell your provider if you feel dizzy or have vision changes or ringing in the ears. As with any medicine, there is a very remote chance of a vaccine causing a severe allergic reaction, other serious injury, or death. 5. What if there is a serious problem? An allergic reaction could occur after the vaccinated person leaves the clinic.  If you see signs of a severe allergic reaction (hives, swelling of the face and throat, difficulty breathing, a fast heartbeat, dizziness, or weakness), call 9-1-1 and get the person to the nearest hospital.    For other signs that concern you, call your health care provider. Adverse reactions should be reported to the Vaccine Adverse Event Reporting System (VAERS). Your health care provider will usually file this report, or you can do it yourself. Visit the VAERS website at www.vaers. hhs.gov or call 3-368.463.9094. VAERS is only for reporting reactions, and VAERS staff do not give medical advice. 6. The National Vaccine Injury Compensation Program    The [facility] Vaccine Injury Compensation Program (VICP) is a federal program that was created to compensate people who may have been injured by certain vaccines. Visit the VICP website at www.[facility].gov/vaccinecompensation or call 5-399.739.7024 to learn about the program and about filing a claim. There is a time limit to file a claim for compensation. 7. How can I learn more?  Ask your health care provider.  Call your local or state health department.  Contact the Centers for Disease Control and Prevention (CDC):  - Call 7-743.551.6302 (1-800-CDC-INFO) or  - Visit CDCs influenza website at www.cdc.gov/flu    Vaccine Information Statement (Interim)  Inactivated Influenza Vaccine   8/15/2019  42 ARETHA Jimenez [id number]   Department of Health and Human Services  Centers for Disease Control and Prevention    Office Use Only

## 2019-10-15 NOTE — PROGRESS NOTES
Rio Arias is a 80 y.o. female who presents today for Anticoagulation monitoring. Indication: Atrial Fibrillation  INR Goal: 2.0-3.0. Current dose:  Coumadin 7.5 mg daily, except Tues/Thur 5 mg. Missed Coumadin Doses:  None  Medication Changes:  no  Dietary Changes:  no    Symptoms: taking coumadin appropriately without any bleeding. Latest INRs:  Lab Results   Component Value Date/Time    INR 3.1 (H) 04/23/2014 11:30 AM    INR (POC) 1.4 (H) 02/16/2018 11:40 AM    INR, External 1.9 04/21/2015 12:48 PM    INR POC 2.6 09/17/2019 10:27 AM    INR POC 2.9 09/03/2019 09:17 AM    INR POC 2.7 08/19/2019 09:25 AM    Prothrombin time 31.6 (H) 04/23/2014 11:30 AM        New Coumadin dose:.current treatment plan is effective, no change in therapy. Next check to be scheduled for  4 weeks.

## 2019-11-12 ENCOUNTER — CLINICAL SUPPORT (OUTPATIENT)
Dept: FAMILY MEDICINE CLINIC | Age: 84
End: 2019-11-12

## 2019-11-12 VITALS
WEIGHT: 153.8 LBS | OXYGEN SATURATION: 97 % | RESPIRATION RATE: 14 BRPM | HEIGHT: 60 IN | BODY MASS INDEX: 30.19 KG/M2 | HEART RATE: 75 BPM | SYSTOLIC BLOOD PRESSURE: 161 MMHG | TEMPERATURE: 98 F | DIASTOLIC BLOOD PRESSURE: 61 MMHG

## 2019-11-12 DIAGNOSIS — I48.20 CHRONIC ATRIAL FIBRILLATION (HCC): ICD-10-CM

## 2019-11-12 DIAGNOSIS — Z90.2 S/P LOBECTOMY OF LUNG: ICD-10-CM

## 2019-11-12 DIAGNOSIS — J06.9 URI WITH COUGH AND CONGESTION: Primary | ICD-10-CM

## 2019-11-12 LAB
INR BLD: 3.1
PT POC: 37.7 SECONDS
VALID INTERNAL CONTROL?: YES

## 2019-11-12 RX ORDER — DOXYCYCLINE 100 MG/1
100 CAPSULE ORAL 2 TIMES DAILY
Qty: 20 CAP | Refills: 0 | Status: SHIPPED | OUTPATIENT
Start: 2019-11-12 | End: 2019-11-22

## 2019-11-12 NOTE — PROGRESS NOTES
Gege Solis is a 80 y.o. female who presents today for Anticoagulation monitoring. Indication: Atrial Fibrillation  INR Goal: 2.0-3.0. Current dose:  Coumadin 7.5 mg daily, except Tues/Thurs 5 mg. Missed Coumadin Doses:  None  Medication Changes:  no  Dietary Changes:  no    Symptoms: taking coumadin appropriately without any bleeding. Latest INRs:  Lab Results   Component Value Date/Time    INR 3.1 (H) 04/23/2014 11:30 AM    INR (POC) 1.4 (H) 02/16/2018 11:40 AM    INR, External 1.9 04/21/2015 12:48 PM    INR POC 2.6 10/15/2019 10:06 AM    INR POC 2.6 09/17/2019 10:27 AM    INR POC 2.9 09/03/2019 09:17 AM    Prothrombin time 31.6 (H) 04/23/2014 11:30 AM        New Coumadin dose:.current treatment plan is effective, no change in therapy. Next check to be scheduled for  2 weeks.

## 2019-11-12 NOTE — PROGRESS NOTES
HPI  Ministerio Crain is a 80 y.o. female who presents with congestion, cough. Started 3 days ago. ok sleep, drinking fluids. Other symptoms include none. Denies fever, wheezing. Had a portion of her lung removed she was a teenager. Has a perception that every time she has got sick since then it has moved into her lungs. 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 20 Cap 0    warfarin (COUMADIN) 5 mg tablet TAKE 1 &amp;amp; 1/2 TABLETS BY MOUTH EVERY WEDNESDAY &amp;amp; SUNDAY &amp;amp; TAKE 1 TABLET BY MOUTH ALL OTHER DAYS FOR CLOTS (Patient taking differently: Pt is taking 1 1/2 tab Mon, Wed, Fri, Sat and Sun then 1 tab on Tues, and Thurs.) 180 Tab 3    hydroCHLOROthiazide (HYDRODIURIL) 25 mg tablet TAKE 1 TABLET BY MOUTH DAILY. 90 Tab 3    levothyroxine (SYNTHROID) 50 mcg tablet TAKE 1 TABLET BY MOUTH EVERY MORNING BEFORE BREAKFAST FOR THYROID 90 Tab 3    doxazosin (CARDURA) 8 mg tablet Take 0.5 tablet twice daily. 90 Tab 3    amLODIPine (NORVASC) 5 mg tablet Take 1 Tab by mouth daily. 90 Tab 3    flecainide (TAMBOCOR) 100 mg tablet Take 1 Tab by mouth three (3) times daily.  270 Tab 1       Allergies:   No Known Allergies    Smoker:  Social History     Tobacco Use   Smoking Status Never Smoker   Smokeless Tobacco Never Used       ETOH:   Social History     Substance and Sexual Activity   Alcohol Use No       FH:   Family History   Problem Relation Age of Onset    Cancer Mother ROS:  As listed in HPI. In addition:  Constitutional:   No headache, fever, fatigue, weight loss or weight gain      Eyes:   No redness, pruritis, pain, visual changes, swelling, or discharge      Ears:    No pain, loss or changes in hearing     Cardiac:    No chest pain      Resp:   No shortness of breath or wheeze     Neuro   No loss of consciousness, dizziness, seizure    Physical Exam:  Blood pressure 161/61, pulse 75, temperature 98 °F (36.7 °C), temperature source Oral, resp. rate 14, height 5' (1.524 m), weight 153 lb 12.8 oz (69.8 kg), SpO2 97 %. GEN: No apparent distress. EYES:  Conjunctiva clear  EAR: TM are clear and without effusion. NOSE: Turbinates are congested  OROPHYARYNX: No erythema or tonsilar exudates  NECK:  Submandibular LAD. Non tender         LUNGS: Respirations unlabored; clear to auscultation bilaterally. No wheeze  CARDIOVASCULAR: Regular, rate, and rhythm  ABDOMEN: Soft; nontender;nl BS  SKIN: No obvious rashes. Assessment and Plan     Viral URI, mild rales in the bases that cleared with deep breaths  Doxycycline in an abundance of caution  Expect ssx to last 6-7 days  Supportive care is best, provided a handout on available OTC remedies  Nasal steroid OTC for congestion  Honey in warm water for cough    INR just a tad high today. Stay current dose and follow-up in 2 weeks    RTC if ssx worsen or fail to improve as expected      ICD-10-CM ICD-9-CM    1. URI with cough and congestion J06.9 465.9    2. Chronic atrial fibrillation I48.20 427.31 AMB POC PT/INR   3. 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 V45.89        AVS given.  Pt expressed understanding of instructions

## 2019-11-19 ENCOUNTER — TELEPHONE (OUTPATIENT)
Dept: FAMILY MEDICINE CLINIC | Age: 84
End: 2019-11-19

## 2019-11-19 DIAGNOSIS — B37.0 ORAL THRUSH: Primary | ICD-10-CM

## 2019-11-19 RX ORDER — CLOTRIMAZOLE 10 MG/1
10 LOZENGE ORAL; TOPICAL
Qty: 50 TAB | Refills: 0 | Status: SHIPPED | OUTPATIENT
Start: 2019-11-19 | End: 2019-11-29

## 2019-11-19 NOTE — TELEPHONE ENCOUNTER
Pt is calling stating she has gotten thrush from the antibiotics  She is wanting you to call in something for her and would like a call when this is done

## 2019-11-20 NOTE — TELEPHONE ENCOUNTER
Called pt, verified name and . Informed pt that per Dr. Sirena Johnson a medication had been called in to her pharmacy. Pt stated understanding and that she had gotten the rx.

## 2019-11-26 ENCOUNTER — CLINICAL SUPPORT (OUTPATIENT)
Dept: FAMILY MEDICINE CLINIC | Age: 84
End: 2019-11-26

## 2019-11-26 VITALS
DIASTOLIC BLOOD PRESSURE: 74 MMHG | TEMPERATURE: 98.1 F | BODY MASS INDEX: 30.04 KG/M2 | HEART RATE: 69 BPM | SYSTOLIC BLOOD PRESSURE: 142 MMHG | WEIGHT: 153 LBS | HEIGHT: 60 IN | OXYGEN SATURATION: 96 % | RESPIRATION RATE: 16 BRPM

## 2019-11-26 DIAGNOSIS — I48.20 CHRONIC ATRIAL FIBRILLATION (HCC): Primary | ICD-10-CM

## 2019-11-26 LAB
INR BLD: 3.2
PT POC: 38.7 SECONDS
VALID INTERNAL CONTROL?: YES

## 2019-11-26 NOTE — PROGRESS NOTES
Janneth Jean is a 80 y.o. female who presents today for Anticoagulation monitoring. Indication: Atrial Fibrillation  INR Goal: 2.0-3.0. Current dose:  Coumadin 7.5 mg daily, except Tues/Thurs 5 mg. Missed Coumadin Doses:  None  Medication Changes:  no  Dietary Changes:  no    Symptoms: taking coumadin appropriately without any bleeding. Latest INRs:  Lab Results   Component Value Date/Time    INR 3.1 (H) 04/23/2014 11:30 AM    INR (POC) 1.4 (H) 02/16/2018 11:40 AM    INR, External 1.9 04/21/2015 12:48 PM    INR POC 3.1 11/12/2019 10:11 AM    INR POC 2.6 10/15/2019 10:06 AM    INR POC 2.6 09/17/2019 10:27 AM    Prothrombin time 31.6 (H) 04/23/2014 11:30 AM        New Coumadin dose:.the following changes are made - 7.5 mg Mon/Wed/Fri and 5 mg rest of the days per Dr. Aleks Zelaya. Next check to be scheduled for  2 weeks per Dr. Aleks Zelaya.

## 2019-12-10 ENCOUNTER — CLINICAL SUPPORT (OUTPATIENT)
Dept: FAMILY MEDICINE CLINIC | Age: 84
End: 2019-12-10

## 2019-12-10 VITALS
TEMPERATURE: 97.9 F | WEIGHT: 157.4 LBS | HEIGHT: 60 IN | OXYGEN SATURATION: 95 % | HEART RATE: 72 BPM | SYSTOLIC BLOOD PRESSURE: 142 MMHG | RESPIRATION RATE: 16 BRPM | BODY MASS INDEX: 30.9 KG/M2 | DIASTOLIC BLOOD PRESSURE: 84 MMHG

## 2019-12-10 DIAGNOSIS — I48.20 CHRONIC ATRIAL FIBRILLATION (HCC): Primary | ICD-10-CM

## 2019-12-10 LAB
INR BLD: 2.3
PT POC: 28.1 SECONDS
VALID INTERNAL CONTROL?: YES

## 2019-12-10 NOTE — PROGRESS NOTES
Loreta Lanza is a 80 y.o. female who presents today for Anticoagulation monitoring. Indication: Atrial Fibrillation  INR Goal: 2.0-3.0. Current dose:  Coumadin 5 mg daily Mon, Wed, Fri, and 7.5 all other days. Missed Coumadin Doses:  None  Medication Changes:  no  Dietary Changes:  no    Symptoms: taking coumadin appropriately without any bleeding. Latest INRs:  Lab Results   Component Value Date/Time    INR 3.1 (H) 04/23/2014 11:30 AM    INR (POC) 1.4 (H) 02/16/2018 11:40 AM    INR, External 1.9 04/21/2015 12:48 PM    INR POC 3.2 11/26/2019 09:45 AM    INR POC 3.1 11/12/2019 10:11 AM    INR POC 2.6 10/15/2019 10:06 AM    Prothrombin time 31.6 (H) 04/23/2014 11:30 AM        New Coumadin dose:.current treatment plan is effective, no change in therapy per Dr. Griselda Begun. Next check to be scheduled for  2 weeks per Dr. Griselda Begun.

## 2019-12-23 ENCOUNTER — HOSPITAL ENCOUNTER (OUTPATIENT)
Dept: LAB | Age: 84
Discharge: HOME | End: 2019-12-23
Payer: MEDICARE

## 2019-12-23 ENCOUNTER — OFFICE VISIT (OUTPATIENT)
Dept: FAMILY MEDICINE CLINIC | Age: 84
End: 2019-12-23

## 2019-12-23 VITALS
SYSTOLIC BLOOD PRESSURE: 155 MMHG | DIASTOLIC BLOOD PRESSURE: 55 MMHG | TEMPERATURE: 98.5 F | WEIGHT: 158 LBS | HEIGHT: 60 IN | HEART RATE: 72 BPM | RESPIRATION RATE: 16 BRPM | OXYGEN SATURATION: 93 % | BODY MASS INDEX: 31.02 KG/M2

## 2019-12-23 DIAGNOSIS — R06.09 DOE (DYSPNEA ON EXERTION): ICD-10-CM

## 2019-12-23 DIAGNOSIS — I10 ESSENTIAL HYPERTENSION: ICD-10-CM

## 2019-12-23 DIAGNOSIS — I48.0 PAROXYSMAL ATRIAL FIBRILLATION (HCC): ICD-10-CM

## 2019-12-23 DIAGNOSIS — R79.9 ABNORMAL FINDING OF BLOOD CHEMISTRY, UNSPECIFIED: ICD-10-CM

## 2019-12-23 DIAGNOSIS — Z79.01 ANTICOAGULATION MONITORING, INR RANGE 2-3: Primary | ICD-10-CM

## 2019-12-23 LAB
INR BLD: 3.6
INR BLD: 43.3
PT POC: 3.6 SECONDS
PT POC: 43.3 SECONDS
VALID INTERNAL CONTROL?: YES
VALID INTERNAL CONTROL?: YES

## 2019-12-23 PROCEDURE — 80053 COMPREHEN METABOLIC PANEL: CPT

## 2019-12-23 PROCEDURE — 84443 ASSAY THYROID STIM HORMONE: CPT

## 2019-12-23 PROCEDURE — 80061 LIPID PANEL: CPT

## 2019-12-23 PROCEDURE — 83036 HEMOGLOBIN GLYCOSYLATED A1C: CPT

## 2019-12-23 PROCEDURE — 85025 COMPLETE CBC W/AUTO DIFF WBC: CPT

## 2019-12-23 PROCEDURE — 83880 ASSAY OF NATRIURETIC PEPTIDE: CPT

## 2019-12-23 RX ORDER — FLECAINIDE ACETATE 100 MG/1
100 TABLET ORAL 2 TIMES DAILY
Qty: 180 TAB | Refills: 3 | Status: SHIPPED | OUTPATIENT
Start: 2019-12-23 | End: 2021-03-25

## 2019-12-23 NOTE — PROGRESS NOTES
Chief Complaint   Patient presents with    Breathing Problem    Anxiety    Medication Evaluation     michael     Patient presents today with complaint of some shortness of breath, especially with exertion. Patient reports that she has noted that she feels very short of breath after completing normal activities around the home. Patient also reports increased anxiety for the past several days. Patient reports that she does get some shortness of breath with anxiety, however current shortness of breath does seem somewhat different. Patient denies chest pain. Patient reports that she is taking all medications as written. Subjective: (As above and below)     Chief Complaint   Patient presents with    Breathing Problem    Anxiety    Medication Evaluation     michael     she is a 80y.o. year old female who presents for evaluation. Reviewed PmHx, RxHx, FmHx, SocHx, AllgHx and updated in chart. Review of Systems - negative except as listed above    Objective:     Vitals:    12/23/19 1501   BP: 155/55   Pulse: 72   Resp: 16   Temp: 98.5 °F (36.9 °C)   TempSrc: Oral   SpO2: 93%   Weight: 158 lb (71.7 kg)   Height: 5' (1.524 m)     Physical Examination: General appearance - alert, well appearing, and in no distress  Mental status - normal mood, behavior, speech, dress, motor activity, and thought processes  Mouth - mucous membranes moist, pharynx normal without lesions  Chest - clear to auscultation, no wheezes, rales or rhonchi, symmetric air entry  Heart - normal rate, regular rhythm, normal S1, S2, no murmurs, rubs, clicks or gallops  Musculoskeletal - no joint tenderness, deformity or swelling  Extremities - peripheral pulses normal, no pedal edema, no clubbing or cyanosis    Assessment/ Plan:   1.  Anticoagulation monitoring, INR range 2-3  -see anticoag instructions  - AMB POC PT/INR  - AMB POC PT/INR    2. DARBY (dyspnea on exertion)  -check labs, no acute changes noted on EKG  - AMB POC EKG ROUTINE W/ 12 LEADS, INTER & REP  - NT-PRO BNP    3. Essential hypertension  - METABOLIC PANEL, COMPREHENSIVE  - LIPID PANEL  - TSH 3RD GENERATION  - CBC WITH AUTOMATED DIFF    4. Paroxysmal atrial fibrillation (HCC)  -stable  - flecainide (TAMBOCOR) 100 mg tablet; Take 1 Tab by mouth two (2) times a day. Dispense: 180 Tab; Refill: 3  - AMB POC EKG ROUTINE W/ 12 LEADS, INTER & REP    5. Abnormal finding of blood chemistry, unspecified   - HEMOGLOBIN A1C WITH EAG     Follow up as needed    I have discussed the diagnosis with the patient and the intended plan as seen in the above orders. The patient has received an after-visit summary and questions were answered concerning future plans.      Medication Side Effects and Warnings were discussed with patient: yes  Patient Labs were reviewed: yes  Patient Past Records were reviewed:  yes    Wolf Klein M.D.

## 2019-12-23 NOTE — PROGRESS NOTES
Chief Complaint   Patient presents with    Breathing Problem    Anxiety    Medication Evaluation     tambicor       1. Have you been to the ER, urgent care clinic since your last visit? Hospitalized since your last visit? No    2. Have you seen or consulted any other health care providers outside of the [address] [place] [address] since your last visit? Include any pap smears or colon screening. No    Health Maintenance Due   Topic Date Due    Shingrix Vaccine Age 49> (1 of 2) 10/29/1981    GLAUCOMA SCREENING Q2Y  05/27/2017     Petr Veliz is a 80 y.o. female who presents today for Anticoagulation monitoring. Patient states current dose coumadin is:   Missed Coumadin Doses:  None  Medication Changes:  no  Dietary Changes:  no    Symptoms: without any bleeding.      Anticoagulation Summary  As of 12/23/2019    INR goal:   2.0-3.0   TTR:   35.2 % (1 mo)   INR used for dosing:   3.6! (12/23/2019)   Warfarin maintenance plan:   5 mg (5 mg x 1) every Mon, Wed, Fri; 7.5 mg (5 mg x 1.5) all other days   Weekly warfarin total:   45 mg   Plan last modified:   Virgilio Wise LPN (30/82/1864)   Next INR check:   1/6/2020   Target end date:            Anticoagulation Episode Summary     INR check location:   Clinic Lab    Preferred lab:   [facility]    Send INR reminders to:       Comments:             Description    Continue same dose and return in 2 weeks per Dr. Jacinta Alexis

## 2019-12-24 LAB
ALBUMIN SERPL-MCNC: 4.3 G/DL (ref 3.5–4.7)
ALBUMIN/GLOB SERPL: 2 {RATIO} (ref 1.2–2.2)
ALP SERPL-CCNC: 69 IU/L (ref 39–117)
ALT SERPL-CCNC: 13 IU/L (ref 0–32)
AST SERPL-CCNC: 16 IU/L (ref 0–40)
BASOPHILS # BLD AUTO: 0 X10E3/UL (ref 0–0.2)
BASOPHILS NFR BLD AUTO: 0 %
BILIRUB SERPL-MCNC: 0.6 MG/DL (ref 0–1.2)
BUN SERPL-MCNC: 19 MG/DL (ref 8–27)
BUN/CREAT SERPL: 22 (ref 12–28)
CALCIUM SERPL-MCNC: 9.4 MG/DL (ref 8.7–10.3)
CHLORIDE SERPL-SCNC: 97 MMOL/L (ref 96–106)
CHOLEST SERPL-MCNC: 199 MG/DL (ref 100–199)
CO2 SERPL-SCNC: 25 MMOL/L (ref 20–29)
CREAT SERPL-MCNC: 0.88 MG/DL (ref 0.57–1)
EOSINOPHIL # BLD AUTO: 0 X10E3/UL (ref 0–0.4)
EOSINOPHIL NFR BLD AUTO: 0 %
ERYTHROCYTE [DISTWIDTH] IN BLOOD BY AUTOMATED COUNT: 12.6 % (ref 12.3–15.4)
EST. AVERAGE GLUCOSE BLD GHB EST-MCNC: 111 MG/DL
GLOBULIN SER CALC-MCNC: 2.1 G/DL (ref 1.5–4.5)
GLUCOSE SERPL-MCNC: 101 MG/DL (ref 65–99)
HBA1C MFR BLD: 5.5 % (ref 4.8–5.6)
HCT VFR BLD AUTO: 32 % (ref 34–46.6)
HDLC SERPL-MCNC: 92 MG/DL
HGB BLD-MCNC: 10.8 G/DL (ref 11.1–15.9)
IMM GRANULOCYTES # BLD AUTO: 0 X10E3/UL (ref 0–0.1)
IMM GRANULOCYTES NFR BLD AUTO: 0 %
INTERPRETATION, 910389: NORMAL
INTERPRETATION: NORMAL
LDLC SERPL CALC-MCNC: 95 MG/DL (ref 0–99)
LYMPHOCYTES # BLD AUTO: 1 X10E3/UL (ref 0.7–3.1)
LYMPHOCYTES NFR BLD AUTO: 11 %
MCH RBC QN AUTO: 31.4 PG (ref 26.6–33)
MCHC RBC AUTO-ENTMCNC: 33.8 G/DL (ref 31.5–35.7)
MCV RBC AUTO: 93 FL (ref 79–97)
MONOCYTES # BLD AUTO: 1.2 X10E3/UL (ref 0.1–0.9)
MONOCYTES NFR BLD AUTO: 13 %
NEUTROPHILS # BLD AUTO: 7.2 X10E3/UL (ref 1.4–7)
NEUTROPHILS NFR BLD AUTO: 76 %
NT-PROBNP SERPL-MCNC: 1302 PG/ML (ref 0–738)
PDF IMAGE, 910387: NORMAL
PLATELET # BLD AUTO: 192 X10E3/UL (ref 150–450)
POTASSIUM SERPL-SCNC: 3.9 MMOL/L (ref 3.5–5.2)
PROT SERPL-MCNC: 6.4 G/DL (ref 6–8.5)
RBC # BLD AUTO: 3.44 X10E6/UL (ref 3.77–5.28)
SODIUM SERPL-SCNC: 137 MMOL/L (ref 134–144)
TRIGL SERPL-MCNC: 60 MG/DL (ref 0–149)
TSH SERPL DL<=0.005 MIU/L-ACNC: 2.22 UIU/ML (ref 0.45–4.5)
VLDLC SERPL CALC-MCNC: 12 MG/DL (ref 5–40)
WBC # BLD AUTO: 9.4 X10E3/UL (ref 3.4–10.8)

## 2019-12-24 NOTE — PROGRESS NOTES
Hemoglobin is slightly low, recommend recheck in 3 to 4 weeks. No evidence of acute heart failure. All other labs are stable.   Please inform

## 2019-12-24 NOTE — PROGRESS NOTES
Called pt, verified name and . Informed pt that per Dr. Otto Connor Hemoglobin is slightly low, recommend recheck in 3 to 4 weeks. No evidence of acute heart failure. All other labs are stable. Pt stated understanding.

## 2019-12-24 NOTE — PROGRESS NOTES
Sinus rhythm, rate 68. First-degree AV block.   No changes compared to previous  Reviewed with patient during appointment

## 2019-12-26 ENCOUNTER — TELEPHONE (OUTPATIENT)
Dept: FAMILY MEDICINE CLINIC | Age: 84
End: 2019-12-26

## 2019-12-26 NOTE — TELEPHONE ENCOUNTER
Daughter called f/u from call that was made to pt on 12/24. I explained what the nurse called about and she wants to know what they can do about her Hemoglobin until they come back in on Jan. 8th to see Dr. Jed El. DO NOT CALL THE PATIENT. SHE DOES NOT UNDERSTAND WHAT IS TOLD TO HER.        Call back at [phone]

## 2020-01-03 ENCOUNTER — HOSPITAL ENCOUNTER (OUTPATIENT)
Dept: GENERAL RADIOLOGY | Age: 85
Discharge: HOME | End: 2020-01-03
Payer: MEDICARE

## 2020-01-03 ENCOUNTER — TELEPHONE (OUTPATIENT)
Dept: FAMILY MEDICINE CLINIC | Age: 85
End: 2020-01-03

## 2020-01-03 DIAGNOSIS — R06.09 DOE (DYSPNEA ON EXERTION): ICD-10-CM

## 2020-01-03 DIAGNOSIS — R06.09 DOE (DYSPNEA ON EXERTION): Primary | ICD-10-CM

## 2020-01-03 PROCEDURE — 71046 X-RAY EXAM CHEST 2 VIEWS: CPT

## 2020-01-03 NOTE — TELEPHONE ENCOUNTER
Daughter is calling stating pt is still having issues with breathing wants to know if she can get an order for chest xray to make sure nothing is going on she was seen by Dr Anderson on 12/23/19 can this be done

## 2020-01-08 ENCOUNTER — OFFICE VISIT (OUTPATIENT)
Dept: FAMILY MEDICINE CLINIC | Age: 85
End: 2020-01-08

## 2020-01-08 ENCOUNTER — HOSPITAL ENCOUNTER (OUTPATIENT)
Dept: LAB | Age: 85
Discharge: HOME | End: 2020-01-08
Payer: MEDICARE

## 2020-01-08 VITALS
HEIGHT: 60 IN | SYSTOLIC BLOOD PRESSURE: 127 MMHG | TEMPERATURE: 98.1 F | BODY MASS INDEX: 30.67 KG/M2 | OXYGEN SATURATION: 97 % | DIASTOLIC BLOOD PRESSURE: 56 MMHG | HEART RATE: 70 BPM | WEIGHT: 156.2 LBS | RESPIRATION RATE: 15 BRPM

## 2020-01-08 DIAGNOSIS — I10 ESSENTIAL HYPERTENSION: ICD-10-CM

## 2020-01-08 DIAGNOSIS — Z79.01 ANTICOAGULATION MONITORING, INR RANGE 2-3: Primary | ICD-10-CM

## 2020-01-08 DIAGNOSIS — R06.09 DOE (DYSPNEA ON EXERTION): ICD-10-CM

## 2020-01-08 DIAGNOSIS — J06.9 URI WITH COUGH AND CONGESTION: ICD-10-CM

## 2020-01-08 DIAGNOSIS — D64.9 ANEMIA, UNSPECIFIED TYPE: ICD-10-CM

## 2020-01-08 LAB
INR BLD: 2.4
PT POC: 28.6 SECONDS
VALID INTERNAL CONTROL?: YES

## 2020-01-08 PROCEDURE — 36415 COLL VENOUS BLD VENIPUNCTURE: CPT

## 2020-01-08 PROCEDURE — 83550 IRON BINDING TEST: CPT

## 2020-01-08 PROCEDURE — 85025 COMPLETE CBC W/AUTO DIFF WBC: CPT

## 2020-01-08 PROCEDURE — 80053 COMPREHEN METABOLIC PANEL: CPT

## 2020-01-08 RX ORDER — GUAIFENESIN 600 MG/1
600 TABLET, EXTENDED RELEASE ORAL 2 TIMES DAILY
COMMUNITY

## 2020-01-08 RX ORDER — AZITHROMYCIN 250 MG/1
TABLET, FILM COATED ORAL
Qty: 6 TAB | Refills: 0 | Status: SHIPPED | OUTPATIENT
Start: 2020-01-08 | End: 2020-01-13

## 2020-01-08 NOTE — PROGRESS NOTES
Chief Complaint   Patient presents with    Breathing Problem     Begins in am and lasts until late afternoon     Cold Symptoms       1. Have you been to the ER, urgent care clinic since your last visit? Hospitalized since your last visit? No    2. Have you seen or consulted any other health care providers outside of the [address] [place] [address] since your last visit? Include any pap smears or colon screening.  Yes When: Chest X-ray last week [address]. Maintenance Due   Topic Date Due    Shingrix Vaccine Age 49> (1 of 2) 10/29/1981    GLAUCOMA SCREENING Q2Y  05/27/2017

## 2020-01-08 NOTE — PROGRESS NOTES
Chief Complaint   Patient presents with    Breathing Problem     Begins in am and lasts until late afternoon     Cold Symptoms     Pt reports that she has been SOB in the morning, gets better in the evening. Pt had a normal CXR 1/3/20. Pt reports that she has also had cold symptoms, coughing with some production, clear thick mucous. Pt started taking mucinex when she started feeling congested. Pt has been taking Mucinex for 8 days. Subjective: (As above and below)     Chief Complaint   Patient presents with    Breathing Problem     Begins in am and lasts until late afternoon     Cold Symptoms     she is a 80y.o. year old female who presents for evaluation. Reviewed PmHx, RxHx, FmHx, SocHx, AllgHx and updated in chart. Review of Systems - negative except as listed above    Objective:     Vitals:    01/08/20 1045   BP: 127/56   Pulse: 70   Resp: 15   Temp: 98.1 °F (36.7 °C)   TempSrc: Oral   SpO2: 97%   Weight: 156 lb 3.2 oz (70.9 kg)   Height: 5' (1.524 m)     Physical Examination: General appearance - alert, well appearing, and in no distress  Mental status - normal mood, behavior, speech, dress, motor activity, and thought processes  Ears - bilateral TM's and external ear canals normal  Mouth - mucous membranes moist, pharynx normal without lesions  Chest - congestion in upper airways  Heart - normal rate, regular rhythm, normal S1, S2, no murmurs, rubs, clicks or gallops  Musculoskeletal - no joint tenderness, deformity or swelling  Extremities - peripheral pulses normal, no pedal edema, no clubbing or cyanosis    Assessment/ Plan:   1. Anticoagulation monitoring, INR range 2-3  -well controlled, check in one month  - AMB POC PT/INR    2. Essential hypertension  -well controlled    3. DARBY (dyspnea on exertion)  -treat with zpak  - CBC WITH AUTOMATED DIFF    4. URI with cough and congestion  -take as written  - azithromycin (ZITHROMAX) 250 mg tablet;  Take 2 tablets today, then take 1 tablet daily  Dispense: 6 Tab; Refill: 0    5. Anemia, unspecified type  -recheck labs, continue on centrum silver  - METABOLIC PANEL, COMPREHENSIVE  - CBC WITH AUTOMATED DIFF  - IRON PROFILE  -check FOBT if dropping    I have discussed the diagnosis with the patient and the intended plan as seen in the above orders. The patient has received an after-visit summary and questions were answered concerning future plans.      Medication Side Effects and Warnings were discussed with patient: yes  Patient Labs were reviewed: yes  Patient Past Records were reviewed:  yes    Patel Driver M.D.

## 2020-01-09 LAB
ALBUMIN SERPL-MCNC: 4.3 G/DL (ref 3.5–4.7)
ALBUMIN/GLOB SERPL: 1.8 {RATIO} (ref 1.2–2.2)
ALP SERPL-CCNC: 74 IU/L (ref 39–117)
ALT SERPL-CCNC: 13 IU/L (ref 0–32)
AST SERPL-CCNC: 16 IU/L (ref 0–40)
BASOPHILS # BLD AUTO: 0 X10E3/UL (ref 0–0.2)
BASOPHILS NFR BLD AUTO: 0 %
BILIRUB SERPL-MCNC: 0.4 MG/DL (ref 0–1.2)
BUN SERPL-MCNC: 17 MG/DL (ref 8–27)
BUN/CREAT SERPL: 19 (ref 12–28)
CALCIUM SERPL-MCNC: 10 MG/DL (ref 8.7–10.3)
CHLORIDE SERPL-SCNC: 99 MMOL/L (ref 96–106)
CO2 SERPL-SCNC: 25 MMOL/L (ref 20–29)
CREAT SERPL-MCNC: 0.89 MG/DL (ref 0.57–1)
EOSINOPHIL # BLD AUTO: 0.1 X10E3/UL (ref 0–0.4)
EOSINOPHIL NFR BLD AUTO: 2 %
ERYTHROCYTE [DISTWIDTH] IN BLOOD BY AUTOMATED COUNT: 12.5 % (ref 11.7–15.4)
GLOBULIN SER CALC-MCNC: 2.4 G/DL (ref 1.5–4.5)
GLUCOSE SERPL-MCNC: 99 MG/DL (ref 65–99)
HCT VFR BLD AUTO: 34.7 % (ref 34–46.6)
HGB BLD-MCNC: 11.5 G/DL (ref 11.1–15.9)
IMM GRANULOCYTES # BLD AUTO: 0 X10E3/UL (ref 0–0.1)
IMM GRANULOCYTES NFR BLD AUTO: 0 %
INTERPRETATION: NORMAL
IRON SATN MFR SERPL: 17 % (ref 15–55)
IRON SERPL-MCNC: 53 UG/DL (ref 27–139)
LYMPHOCYTES # BLD AUTO: 1.7 X10E3/UL (ref 0.7–3.1)
LYMPHOCYTES NFR BLD AUTO: 25 %
MCH RBC QN AUTO: 30.1 PG (ref 26.6–33)
MCHC RBC AUTO-ENTMCNC: 33.1 G/DL (ref 31.5–35.7)
MCV RBC AUTO: 91 FL (ref 79–97)
MONOCYTES # BLD AUTO: 0.8 X10E3/UL (ref 0.1–0.9)
MONOCYTES NFR BLD AUTO: 11 %
NEUTROPHILS # BLD AUTO: 4.1 X10E3/UL (ref 1.4–7)
NEUTROPHILS NFR BLD AUTO: 62 %
PLATELET # BLD AUTO: 270 X10E3/UL (ref 150–450)
POTASSIUM SERPL-SCNC: 5.2 MMOL/L (ref 3.5–5.2)
PROT SERPL-MCNC: 6.7 G/DL (ref 6–8.5)
RBC # BLD AUTO: 3.82 X10E6/UL (ref 3.77–5.28)
SODIUM SERPL-SCNC: 139 MMOL/L (ref 134–144)
TIBC SERPL-MCNC: 304 UG/DL (ref 250–450)
UIBC SERPL-MCNC: 251 UG/DL (ref 118–369)
WBC # BLD AUTO: 6.8 X10E3/UL (ref 3.4–10.8)

## 2020-01-10 NOTE — PROGRESS NOTES
Called pt, verified name and . Informed pt that per Dr. Viona Libman Hemoglobin has returned to normal.   All other labs are within normal limits. Daughter stated understanding.

## 2020-01-20 NOTE — TELEPHONE ENCOUNTER
Daughter is calling stating med was filled with old directions and not the new states pharmacy is saying they didn't get the new directions it shows when ordered that is was correct but they filled wrong need to get straight she did get the med with the wrong direction

## 2020-01-24 NOTE — TELEPHONE ENCOUNTER
Denton Cooney states that the medication she was calling about was flecainide the pharmacist filled the medication for 3 times a day instead of 2 twice a day. Pt already has enough to last 3-4 months.  So, she will call us back when pt is due for refill and we will get it straight with gumaro then

## 2020-02-05 ENCOUNTER — CLINICAL SUPPORT (OUTPATIENT)
Dept: FAMILY MEDICINE CLINIC | Age: 85
End: 2020-02-05

## 2020-02-05 VITALS
WEIGHT: 156 LBS | BODY MASS INDEX: 30.63 KG/M2 | OXYGEN SATURATION: 97 % | HEART RATE: 71 BPM | RESPIRATION RATE: 16 BRPM | SYSTOLIC BLOOD PRESSURE: 179 MMHG | TEMPERATURE: 98 F | DIASTOLIC BLOOD PRESSURE: 69 MMHG | HEIGHT: 60 IN

## 2020-02-05 DIAGNOSIS — I10 ESSENTIAL HYPERTENSION: Primary | ICD-10-CM

## 2020-02-05 DIAGNOSIS — Z79.01 ANTICOAGULATION MONITORING, INR RANGE 2-3: ICD-10-CM

## 2020-02-05 LAB
INR BLD: 2.4
PT POC: 28.8 SECONDS
VALID INTERNAL CONTROL?: YES

## 2020-02-05 NOTE — PROGRESS NOTES
Adina Loja is a 80 y.o. female who presents today for Anticoagulation monitoring. Patient states current dose coumadin is:   Missed Coumadin Doses:  None  Medication Changes:  no  Dietary Changes:  no    Symptoms: without any bleeding.      Anticoagulation Summary  As of 2020    INR goal:   2.0-3.0   TTR:   52.1 % (2.5 mo)   INR used for dosin.4 (2020)   Warfarin maintenance plan:   7.5 mg (5 mg x 1.5) every Sun, Thu, Sat; 5 mg (5 mg x 1) all other days   Weekly warfarin total:   42.5 mg   Plan last modified:   Corrie Anderson MD (2019)   Next INR check:   3/5/2020   Target end date:            Anticoagulation Episode Summary     INR check location:   Clinic Lab    Preferred lab:   [facility]    Send INR reminders to:       Comments:

## 2020-03-04 ENCOUNTER — CLINICAL SUPPORT (OUTPATIENT)
Dept: FAMILY MEDICINE CLINIC | Age: 85
End: 2020-03-04

## 2020-03-04 VITALS
DIASTOLIC BLOOD PRESSURE: 59 MMHG | RESPIRATION RATE: 16 BRPM | HEART RATE: 75 BPM | BODY MASS INDEX: 30.47 KG/M2 | SYSTOLIC BLOOD PRESSURE: 155 MMHG | OXYGEN SATURATION: 98 % | TEMPERATURE: 97.8 F | HEIGHT: 60 IN

## 2020-03-04 DIAGNOSIS — Z79.01 ANTICOAGULATION MONITORING, INR RANGE 2-3: Primary | ICD-10-CM

## 2020-03-04 LAB
INR BLD: 2
PT POC: 24.4 SECONDS
VALID INTERNAL CONTROL?: YES

## 2020-03-23 RX ORDER — AMLODIPINE BESYLATE 5 MG/1
TABLET ORAL
Qty: 90 TAB | Refills: 3 | Status: SHIPPED | OUTPATIENT
Start: 2020-03-23 | End: 2021-03-18

## 2020-04-01 ENCOUNTER — CLINICAL SUPPORT (OUTPATIENT)
Dept: FAMILY MEDICINE CLINIC | Age: 85
End: 2020-04-01

## 2020-04-01 DIAGNOSIS — Z79.01 ANTICOAGULATION MONITORING, INR RANGE 2-3: Primary | ICD-10-CM

## 2020-04-01 LAB
INR BLD: 2.4 (ref 1–1.5)
PT POC: 28.3 SECONDS (ref 9.1–12)
VALID INTERNAL CONTROL?: YES

## 2020-05-11 ENCOUNTER — TELEPHONE (OUTPATIENT)
Dept: FAMILY MEDICINE CLINIC | Age: 85
End: 2020-05-11

## 2020-05-11 DIAGNOSIS — Z79.01 ANTICOAGULATION MONITORING, INR RANGE 2-3: Primary | ICD-10-CM

## 2020-05-11 NOTE — TELEPHONE ENCOUNTER
Daughter is calling stating they never heard back from office when to go get [employer] and pt is over due she needs ptinr done I asked which [facility] she would like order sent to and she said [facility] she can do any day but Thursday daughter would like a call when this is done and she would like to know how it works do they wait for results there or will someone call her back she would like to know she can be reached at [phone]

## 2020-05-11 NOTE — TELEPHONE ENCOUNTER
Called pts daughter, verified name and . Informed her that per Dr. Ran Callaway INR labs have been ordered and faxed to [facility]. She stated understanding.

## 2020-05-11 NOTE — TELEPHONE ENCOUNTER
Please order INR labs for pt. We will send to [facility] in [address] and inform pts daughter.

## 2020-05-26 ENCOUNTER — OFFICE VISIT (OUTPATIENT)
Dept: FAMILY MEDICINE CLINIC | Age: 85
End: 2020-05-26

## 2020-05-26 ENCOUNTER — HOSPITAL ENCOUNTER (OUTPATIENT)
Dept: LAB | Age: 85
Discharge: HOME | End: 2020-05-26
Payer: MEDICARE

## 2020-05-26 VITALS
OXYGEN SATURATION: 97 % | TEMPERATURE: 96.8 F | SYSTOLIC BLOOD PRESSURE: 160 MMHG | DIASTOLIC BLOOD PRESSURE: 65 MMHG | HEART RATE: 69 BPM | WEIGHT: 154 LBS | BODY MASS INDEX: 30.08 KG/M2

## 2020-05-26 DIAGNOSIS — I48.0 PAROXYSMAL ATRIAL FIBRILLATION (HCC): ICD-10-CM

## 2020-05-26 DIAGNOSIS — E03.9 HYPOTHYROIDISM, UNSPECIFIED TYPE: ICD-10-CM

## 2020-05-26 DIAGNOSIS — I10 ESSENTIAL HYPERTENSION: Primary | ICD-10-CM

## 2020-05-26 DIAGNOSIS — E78.2 MIXED HYPERLIPIDEMIA: ICD-10-CM

## 2020-05-26 LAB
INR BLD: 2.5
PT POC: 30 SECONDS
VALID INTERNAL CONTROL?: YES

## 2020-05-26 PROCEDURE — 85025 COMPLETE CBC W/AUTO DIFF WBC: CPT

## 2020-05-26 PROCEDURE — 80061 LIPID PANEL: CPT

## 2020-05-26 PROCEDURE — 80053 COMPREHEN METABOLIC PANEL: CPT

## 2020-05-26 PROCEDURE — 84443 ASSAY THYROID STIM HORMONE: CPT

## 2020-05-26 RX ORDER — SPIRONOLACTONE 50 MG/1
TABLET, FILM COATED ORAL
Qty: 30 TAB | Refills: 0 | Status: SHIPPED | OUTPATIENT
Start: 2020-05-26 | End: 2020-06-22

## 2020-05-26 NOTE — PROGRESS NOTES
.  Chief Complaint   Patient presents with    Swelling     both legs below the knee     . 1. Have you been to the ER, urgent care clinic since your last visit? Hospitalized since your last visit? no    2. Have you seen or consulted any other health care providers outside of the [facility] since your last visit? Include any pap smears or colon screening.  no    .  Health Maintenance Due   Topic Date Due    Shingrix Vaccine Age 49> (1 of 2) 10/29/1981    GLAUCOMA SCREENING Q2Y  05/27/2017    Medicare Yearly Exam  05/31/2020     . [facility]

## 2020-05-26 NOTE — PROGRESS NOTES
Chief Complaint   Patient presents with    Swelling     both legs below the knee     Pt was seen in the office. Pt reports that she has had increased swelling in her left leg for about a week. Pt has had numbness in her left lateral foot for over a year. Pt reports that she also has pain in her left knee off and on for about 6 weeks. Pt sleeps in a recliner. Subjective: (As above and below)     Chief Complaint   Patient presents with    Swelling     both legs below the knee     she is a 80y.o. year old female who presents for evaluation. Reviewed PmHx, RxHx, FmHx, SocHx, AllgHx and updated in chart. Review of Systems - negative except as listed above    Objective:     Vitals:    05/26/20 1401   BP: 160/65   Pulse: 69   Temp: 96.8 °F (36 °C)   TempSrc: Temporal   SpO2: 97%   Weight: 154 lb (69.9 kg)     Physical Examination: General appearance - alert, well appearing, and in no distress  Mental status - normal mood, behavior, speech, dress, motor activity, and thought processes  Mouth - mucous membranes moist, pharynx normal without lesions  Chest - clear to auscultation, no wheezes, rales or rhonchi, symmetric air entry  Heart - normal rate, regular rhythm, normal S1, S2, no murmurs, rubs, clicks or gallops  Musculoskeletal - left lateral foot numbness  Extremities - 1+ edema in left lower leg to mid calf    Assessment/ Plan:   1. Essential hypertension  -slightly elevated today, add spironolactone as written  - CBC WITH AUTOMATED DIFF    2. Hypothyroidism, unspecified type  -check labs  - TSH 3RD GENERATION    3. Mixed hyperlipidemia  - METABOLIC PANEL, COMPREHENSIVE  - LIPID PANEL    4. Paroxysmal atrial fibrillation (HCC)  -well controlled, continue on current dose  - AMB POC PT/INR     Check INR in one month    I have discussed the diagnosis with the patient and the intended plan as seen in the above orders.   The patient has received an after-visit summary and questions were answered concerning future plans.      Medication Side Effects and Warnings were discussed with patient: yes  Patient Labs were reviewed: yes  Patient Past Records were reviewed:  yes    Mely Leblanc M.D.

## 2020-05-27 LAB
ALBUMIN SERPL-MCNC: 4.8 G/DL (ref 3.6–4.6)
ALBUMIN/GLOB SERPL: 2.1 {RATIO} (ref 1.2–2.2)
ALP SERPL-CCNC: 75 IU/L (ref 39–117)
ALT SERPL-CCNC: 12 IU/L (ref 0–32)
AST SERPL-CCNC: 23 IU/L (ref 0–40)
BASOPHILS # BLD AUTO: 0 X10E3/UL (ref 0–0.2)
BASOPHILS NFR BLD AUTO: 0 %
BILIRUB SERPL-MCNC: 0.4 MG/DL (ref 0–1.2)
BUN SERPL-MCNC: 34 MG/DL (ref 8–27)
BUN/CREAT SERPL: 32 (ref 12–28)
CALCIUM SERPL-MCNC: 9.8 MG/DL (ref 8.7–10.3)
CHLORIDE SERPL-SCNC: 99 MMOL/L (ref 96–106)
CHOLEST SERPL-MCNC: 206 MG/DL (ref 100–199)
CO2 SERPL-SCNC: 22 MMOL/L (ref 20–29)
CREAT SERPL-MCNC: 1.07 MG/DL (ref 0.57–1)
EOSINOPHIL # BLD AUTO: 0.1 X10E3/UL (ref 0–0.4)
EOSINOPHIL NFR BLD AUTO: 1 %
ERYTHROCYTE [DISTWIDTH] IN BLOOD BY AUTOMATED COUNT: 13.7 % (ref 11.7–15.4)
GLOBULIN SER CALC-MCNC: 2.3 G/DL (ref 1.5–4.5)
GLUCOSE SERPL-MCNC: 95 MG/DL (ref 65–99)
HCT VFR BLD AUTO: 37.6 % (ref 34–46.6)
HDLC SERPL-MCNC: 79 MG/DL
HGB BLD-MCNC: 12.6 G/DL (ref 11.1–15.9)
IMM GRANULOCYTES # BLD AUTO: 0 X10E3/UL (ref 0–0.1)
IMM GRANULOCYTES NFR BLD AUTO: 0 %
INTERPRETATION, 910389: NORMAL
INTERPRETATION: NORMAL
LDLC SERPL CALC-MCNC: 108 MG/DL (ref 0–99)
LYMPHOCYTES # BLD AUTO: 1.6 X10E3/UL (ref 0.7–3.1)
LYMPHOCYTES NFR BLD AUTO: 21 %
MCH RBC QN AUTO: 30.7 PG (ref 26.6–33)
MCHC RBC AUTO-ENTMCNC: 33.5 G/DL (ref 31.5–35.7)
MCV RBC AUTO: 92 FL (ref 79–97)
MONOCYTES # BLD AUTO: 0.7 X10E3/UL (ref 0.1–0.9)
MONOCYTES NFR BLD AUTO: 9 %
NEUTROPHILS # BLD AUTO: 5.2 X10E3/UL (ref 1.4–7)
NEUTROPHILS NFR BLD AUTO: 69 %
PDF IMAGE, 910387: NORMAL
PLATELET # BLD AUTO: 184 X10E3/UL (ref 150–450)
POTASSIUM SERPL-SCNC: 5.5 MMOL/L (ref 3.5–5.2)
PROT SERPL-MCNC: 7.1 G/DL (ref 6–8.5)
RBC # BLD AUTO: 4.11 X10E6/UL (ref 3.77–5.28)
SODIUM SERPL-SCNC: 139 MMOL/L (ref 134–144)
TRIGL SERPL-MCNC: 93 MG/DL (ref 0–149)
TSH SERPL DL<=0.005 MIU/L-ACNC: 2.19 UIU/ML (ref 0.45–4.5)
VLDLC SERPL CALC-MCNC: 19 MG/DL (ref 5–40)
WBC # BLD AUTO: 7.6 X10E3/UL (ref 3.4–10.8)

## 2020-05-27 NOTE — PROGRESS NOTES
Potassium is slightly elevated. Please advise pt to drink more water, recheck in one month. All other labs are within normal limits.    Please inform

## 2020-05-27 NOTE — PROGRESS NOTES
Called pt, verified name and . Informed pt that per Dr. Cruz Mcclellan Potassium is slightly elevated. Please advise pt to drink more water, recheck in one month. All other labs are within normal limits. Pt stated understanding, Mailed requested copy of labwork .

## 2020-06-17 ENCOUNTER — TELEPHONE (OUTPATIENT)
Dept: FAMILY MEDICINE CLINIC | Age: 85
End: 2020-06-17

## 2020-06-17 DIAGNOSIS — I48.0 PAROXYSMAL ATRIAL FIBRILLATION (HCC): Primary | ICD-10-CM

## 2020-06-17 DIAGNOSIS — I10 ESSENTIAL HYPERTENSION: ICD-10-CM

## 2020-06-22 ENCOUNTER — HOSPITAL ENCOUNTER (OUTPATIENT)
Dept: LAB | Age: 85
Discharge: HOME | End: 2020-06-22
Payer: MEDICARE

## 2020-06-22 PROCEDURE — 36415 COLL VENOUS BLD VENIPUNCTURE: CPT

## 2020-06-22 PROCEDURE — 85610 PROTHROMBIN TIME: CPT

## 2020-06-22 PROCEDURE — 80048 BASIC METABOLIC PNL TOTAL CA: CPT

## 2020-06-22 RX ORDER — SPIRONOLACTONE 50 MG/1
TABLET, FILM COATED ORAL
Qty: 30 TAB | Refills: 0 | Status: SHIPPED | OUTPATIENT
Start: 2020-06-22 | End: 2020-07-19

## 2020-06-23 LAB
BUN SERPL-MCNC: 23 MG/DL (ref 8–27)
BUN/CREAT SERPL: 23 (ref 12–28)
CALCIUM SERPL-MCNC: 9.7 MG/DL (ref 8.7–10.3)
CHLORIDE SERPL-SCNC: 102 MMOL/L (ref 96–106)
CO2 SERPL-SCNC: 25 MMOL/L (ref 20–29)
CREAT SERPL-MCNC: 0.99 MG/DL (ref 0.57–1)
GLUCOSE SERPL-MCNC: 119 MG/DL (ref 65–99)
INR PPP: 2.2 (ref 0.8–1.2)
INTERPRETATION: NORMAL
POTASSIUM SERPL-SCNC: 4.3 MMOL/L (ref 3.5–5.2)
PROTHROMBIN TIME: 22.8 SEC (ref 9.1–12)
SODIUM SERPL-SCNC: 141 MMOL/L (ref 134–144)

## 2020-06-23 NOTE — PROGRESS NOTES
Called pt, verified name and . Informed pt that per Dr. Josselin Rhodes Potassium normalized   INR at goal, please continue on current dosing. Pt stated understanding.

## 2020-07-04 DIAGNOSIS — J40 BRONCHITIS: ICD-10-CM

## 2020-07-07 RX ORDER — HYDROCHLOROTHIAZIDE 25 MG/1
TABLET ORAL
Qty: 90 TAB | Refills: 1 | Status: SHIPPED | OUTPATIENT
Start: 2020-07-07 | End: 2020-12-31

## 2020-07-07 RX ORDER — DOXAZOSIN 8 MG/1
TABLET ORAL
Qty: 45 TAB | Refills: 1 | Status: SHIPPED | OUTPATIENT
Start: 2020-07-07 | End: 2020-12-31

## 2020-07-19 RX ORDER — SPIRONOLACTONE 50 MG/1
TABLET, FILM COATED ORAL
Qty: 30 TAB | Refills: 0 | Status: SHIPPED | OUTPATIENT
Start: 2020-07-19 | End: 2021-08-19 | Stop reason: ALTCHOICE

## 2020-07-28 ENCOUNTER — CLINICAL SUPPORT (OUTPATIENT)
Dept: FAMILY MEDICINE CLINIC | Age: 85
End: 2020-07-28

## 2020-07-28 DIAGNOSIS — Z79.01 ANTICOAGULATION MONITORING, INR RANGE 2-3: Primary | ICD-10-CM

## 2020-07-28 LAB
INR BLD: 2.3
PT POC: 27.9 SECONDS
VALID INTERNAL CONTROL?: YES

## 2020-07-28 NOTE — PROGRESS NOTES
Marimar Ricketts is a 80 y.o. female who presents today for Anticoagulation monitoring. Patient states current dose coumadin is:   Missed Coumadin Doses:  None  Medication Changes:  no  Dietary Changes:  no    Symptoms: without any bleeding. Anticoagulation Summary  As of 2020    INR goal:   2.0-3.0   TTR:   85.5 % (8.3 mo)   INR used for dosin.3 (2020)   Warfarin maintenance plan:   7.5 mg (5 mg x 1.5) every Sun, Thu, Sat; 5 mg (5 mg x 1) all other days   Weekly warfarin total:   42.5 mg   Plan last modified:   Lamine Anderson MD (2019)   Next INR check:   2020   Target end date:            Anticoagulation Episode Summary     INR check location:   Clinic Lab    Preferred lab:   [facility]    Send INR reminders to:       Comments:             Description    Stay on current dose per Dr. Noe Helms.  Follow-up in 1 month

## 2020-09-01 RX ORDER — LEVOTHYROXINE SODIUM 50 UG/1
TABLET ORAL
Qty: 90 TAB | Refills: 1 | Status: SHIPPED | OUTPATIENT
Start: 2020-09-01 | End: 2021-02-26

## 2020-09-01 RX ORDER — WARFARIN SODIUM 5 MG/1
TABLET ORAL
Qty: 180 TAB | Refills: 1 | Status: SHIPPED | OUTPATIENT
Start: 2020-09-01 | End: 2021-02-26

## 2020-09-03 ENCOUNTER — CLINICAL SUPPORT (OUTPATIENT)
Dept: FAMILY MEDICINE CLINIC | Age: 85
End: 2020-09-03
Payer: MEDICARE

## 2020-09-03 VITALS — TEMPERATURE: 97.2 F | WEIGHT: 149 LBS | BODY MASS INDEX: 29.1 KG/M2

## 2020-09-03 DIAGNOSIS — Z79.01 ANTICOAGULATION MONITORING, INR RANGE 2-3: Primary | ICD-10-CM

## 2020-09-03 DIAGNOSIS — I48.0 PAROXYSMAL ATRIAL FIBRILLATION (HCC): ICD-10-CM

## 2020-09-03 LAB
INR BLD: 2.6 (ref 1–1.5)
PT POC: 31.6 SECONDS (ref 9.1–12)
VALID INTERNAL CONTROL?: YES

## 2020-09-03 PROCEDURE — 85610 PROTHROMBIN TIME: CPT | Performed by: FAMILY MEDICINE

## 2020-09-21 ENCOUNTER — OFFICE VISIT (OUTPATIENT)
Dept: FAMILY MEDICINE CLINIC | Age: 85
End: 2020-09-21
Payer: MEDICARE

## 2020-09-21 VITALS
OXYGEN SATURATION: 96 % | HEIGHT: 60 IN | DIASTOLIC BLOOD PRESSURE: 67 MMHG | HEART RATE: 75 BPM | BODY MASS INDEX: 30.04 KG/M2 | SYSTOLIC BLOOD PRESSURE: 183 MMHG | RESPIRATION RATE: 19 BRPM | WEIGHT: 153 LBS

## 2020-09-21 DIAGNOSIS — Z00.00 MEDICARE ANNUAL WELLNESS VISIT, SUBSEQUENT: ICD-10-CM

## 2020-09-21 DIAGNOSIS — Z23 ENCOUNTER FOR IMMUNIZATION: ICD-10-CM

## 2020-09-21 DIAGNOSIS — B37.0 THRUSH: Primary | ICD-10-CM

## 2020-09-21 DIAGNOSIS — I27.20 PULMONARY HYPERTENSION (HCC): ICD-10-CM

## 2020-09-21 DIAGNOSIS — Z13.39 SCREENING FOR ALCOHOLISM: ICD-10-CM

## 2020-09-21 PROCEDURE — 1101F PT FALLS ASSESS-DOCD LE1/YR: CPT | Performed by: FAMILY MEDICINE

## 2020-09-21 PROCEDURE — G8417 CALC BMI ABV UP PARAM F/U: HCPCS | Performed by: FAMILY MEDICINE

## 2020-09-21 PROCEDURE — G8427 DOCREV CUR MEDS BY ELIG CLIN: HCPCS | Performed by: FAMILY MEDICINE

## 2020-09-21 PROCEDURE — 90662 IIV NO PRSV INCREASED AG IM: CPT

## 2020-09-21 PROCEDURE — G0439 PPPS, SUBSEQ VISIT: HCPCS | Performed by: FAMILY MEDICINE

## 2020-09-21 PROCEDURE — G8536 NO DOC ELDER MAL SCRN: HCPCS | Performed by: FAMILY MEDICINE

## 2020-09-21 PROCEDURE — G8432 DEP SCR NOT DOC, RNG: HCPCS | Performed by: FAMILY MEDICINE

## 2020-09-21 RX ORDER — NYSTATIN 100000 [USP'U]/ML
500000 SUSPENSION ORAL 4 TIMES DAILY
Qty: 200 ML | Refills: 0 | Status: SHIPPED | OUTPATIENT
Start: 2020-09-21 | End: 2020-10-05 | Stop reason: SDUPTHER

## 2020-09-21 NOTE — PROGRESS NOTES
Chief Complaint   Patient presents with   Madeline Settler     in mouth     Pt was seen in office with daughter. Pt reports that she has had burning in her mouth x 4 days. Pt does not wear her teeth at home, thought she maybe ate something that was too hard. This is the Subsequent Medicare Annual Wellness Exam, performed 12 months or more after the Initial AWV or the last Subsequent AWV    I have reviewed the patient's medical history in detail and updated the computerized patient record. 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 90 Tab 1    spironolactone (ALDACTONE) 50 mg tablet TAKE ONE TABLET BY MOUTH DAILY FOR 5 DAYS THEN DAILY AS NEEDED FOR INCREASED SWELLING 30 Tab 0    hydroCHLOROthiazide (HYDRODIURIL) 25 mg tablet TAKE ONE TABLET BY MOUTH DAILY 90 Tab 1    doxazosin (CARDURA) 8 mg tablet Take 0.5 tablet once daily.  45 Tab 1    amLODIPine (NORVASC) 5 mg tablet TAKE ONE TABLET BY MOUTH DAILY FOR BLOOD PRESSURE 90 Tab 3    multivit-min-FA-lycopen-lutein (CENTRUM SILVER) 0.4-300-250 mg-mcg-mcg tab Take  by mouth.  guaiFENesin ER (MUCINEX) 600 mg ER tablet Take 600 mg by mouth two (2) times a day.  flecainide (TAMBOCOR) 100 mg tablet Take 1 Tab by mouth two (2) times a day. 180 Tab 3     No Known Allergies    Family History   Problem Relation Age of Onset    Cancer Mother      Social History     Tobacco Use    Smoking status: Never Smoker    Smokeless tobacco: Never Used   Substance Use Topics    Alcohol use: No       Depression Risk Factor Screening:     3 most recent PHQ Screens 7/28/2020   Little interest or pleasure in doing things Not at all   Feeling down, depressed, irritable, or hopeless Not at all   Total Score PHQ 2 0       Alcohol Risk Screen   Do you average more than 1 drink per night or more than 7 drinks a week:  No    On any one occasion in the past three months have you have had more than 3 drinks containing alcohol:  No        Functional Ability and Level of Safety:   Hearing: Some longstanding hearing issues. Activities of Daily Living: The home contains: handrails and grab bars  Patient does total self care     Ambulation: with no difficulty     Fall Risk:  Fall Risk Assessment, last 12 mths 7/28/2020   Able to walk? Yes   Fall in past 12 months? No   Fall with injury? -   Number of falls in past 12 months -   Fall Risk Score -     Abuse Screen:  Patient is not abused       Cognitive Screening   Has your family/caregiver stated any concerns about your memory: Some short term changes in memory      Patient Care Team   Patient Care Team:  Joselo Head MD as PCP - General (Family Medicine)  Inga Anderson MD as PCP - [address] [place] [address] [facility]  Say Morales MD (Cardiology)    Assessment/Plan   Education and counseling provided:  Are appropriate based on today's review and evaluation    Diagnoses and all orders for this visit:    1. Thrush  -     nystatin (MYCOSTATIN) 100,000 unit/mL suspension;  Take 5 mL by mouth four (4) times daily for 10 days. swish and spit    2. Pulmonary hypertension ([facility]    3. Medicare annual wellness visit, subsequent    4. Screening for alcoholism    5.  Encounter for immunization  -     ADMIN INFLUENZA VIRUS VAC  -     INFLUENZA VIRUS VACCINE, HIGH DOSE SEASONAL, PRESERVATIVE FREE        Health Maintenance Due   Topic Date Due    GLAUCOMA SCREENING Q2Y  05/27/2017    Flu Vaccine (1) 09/01/2020

## 2020-09-21 NOTE — PROGRESS NOTES
Chief Complaint   Patient presents with   [employer]     in [facility] Maintenance reviewed     1. Have you been to the ER, urgent care clinic since your last visit? Hospitalized since your last visit? No     2. Have you seen or consulted any other health care providers outside of the [address] [place] [address] since your last visit? Include any pap smears or colon screening.   No

## 2020-09-21 NOTE — PATIENT INSTRUCTIONS
Medicare Wellness Visit, Female The best way to live healthy is to have a lifestyle where you eat a well-balanced diet, exercise regularly, limit alcohol use, and quit all forms of tobacco/nicotine, if applicable. Regular preventive services are another way to keep healthy. Preventive services (vaccines, screening tests, monitoring & exams) can help personalize your care plan, which helps you manage your own care. Screening tests can find health problems at the earliest stages, when they are easiest to treat. [facility] follows the current, evidence-based guidelines published by the [facility] ([facility]) when recommending preventive services for our patients. Because we follow these guidelines, sometimes recommendations change over time as research supports it. (For example, mammograms used to be recommended annually. Even though Medicare will still pay for an annual mammogram, the newer guidelines recommend a mammogram every two years for women of average risk). Of course, you and your doctor may decide to screen more often for some diseases, based on your risk and your co-morbidities (chronic disease you are already diagnosed with). Preventive services for you include: - Medicare offers their members a free annual wellness visit, which is time for you and your primary care provider to discuss and plan for your preventive service needs. Take advantage of this benefit every year! 
-All adults over the age of 72 should receive the recommended pneumonia vaccines. Current USPSTF guidelines recommend a series of two vaccines for the best pneumonia protection.  
-All adults should have a flu vaccine yearly and a tetanus vaccine every 10 years.  
-All adults age 48 and older should receive the shingles vaccines (series of two vaccines). -All adults age 38-68 who are overweight should have a diabetes screening test once every three years. -All adults born between [birth year] and 1965 should be screened once for Hepatitis C. 
-Other screening tests and preventive services for persons with diabetes include: an eye exam to screen for diabetic retinopathy, a kidney function test, a foot exam, and stricter control over your cholesterol.  
-Cardiovascular screening for adults with routine risk involves an electrocardiogram (ECG) at intervals determined by your doctor.  
-Colorectal cancer screenings should be done for adults age 54-65 with no increased risk factors for colorectal cancer. There are a number of acceptable methods of screening for this type of cancer. Each test has its own benefits and drawbacks. Discuss with your doctor what is most appropriate for you during your annual wellness visit. The different tests include: colonoscopy (considered the best screening method), a fecal occult blood test, a fecal DNA test, and sigmoidoscopy. 
 
-A bone mass density test is recommended when a woman turns 65 to screen for osteoporosis. This test is only recommended one time, as a screening. Some providers will use this same test as a disease monitoring tool if you already have osteoporosis. -Breast cancer screenings are recommended every other year for women of normal risk, age 54-69. 
-Cervical cancer screenings for women over age 72 are only recommended with certain risk factors. Here is a list of your current Health Maintenance items (your personalized list of preventive services) with a due date: 
Health Maintenance Due Topic Date Due  Glaucoma Screening   05/27/2017  Yearly Flu Vaccine (1) 09/01/2020

## 2020-10-05 ENCOUNTER — CLINICAL SUPPORT (OUTPATIENT)
Dept: FAMILY MEDICINE CLINIC | Age: 85
End: 2020-10-05
Payer: MEDICARE

## 2020-10-05 VITALS — TEMPERATURE: 97.1 F

## 2020-10-05 DIAGNOSIS — B37.0 ORAL THRUSH: ICD-10-CM

## 2020-10-05 DIAGNOSIS — B37.0 THRUSH: ICD-10-CM

## 2020-10-05 DIAGNOSIS — Z79.01 ANTICOAGULATION MONITORING, INR RANGE 2-3: Primary | ICD-10-CM

## 2020-10-05 LAB
INR BLD: 2.3
PT POC: 27.4 SECONDS
VALID INTERNAL CONTROL?: YES

## 2020-10-05 PROCEDURE — 85610 PROTHROMBIN TIME: CPT | Performed by: FAMILY MEDICINE

## 2020-10-05 RX ORDER — FLUCONAZOLE 150 MG/1
150 TABLET ORAL DAILY
Qty: 2 TAB | Refills: 0 | Status: SHIPPED | OUTPATIENT
Start: 2020-10-05 | End: 2021-08-19 | Stop reason: ALTCHOICE

## 2020-10-05 RX ORDER — NYSTATIN 100000 [USP'U]/ML
500000 SUSPENSION ORAL 4 TIMES DAILY
Qty: 473 ML | Refills: 1 | Status: SHIPPED | OUTPATIENT
Start: 2020-10-05 | End: 2022-01-06 | Stop reason: SDUPTHER

## 2020-10-05 NOTE — PROGRESS NOTES
Chief Complaint   Patient presents with    Anticoagulation     Please see anti-coag calendar. Patient reports that she has had continued burning in her mouth, especially her tongue. Patient reports initial improvement with nystatin mouthwash, however it seemed to stop helping and pain returned over the past 2 to 3 days. Added oral treatment, slight white coating noted on tongue.   Follow-up if not improving

## 2020-10-05 NOTE — PROGRESS NOTES
Chief Complaint   Patient presents with   Alessandro Faust is a 80 y.o. female who presents today for Anticoagulation monitoring. Patient states current dose coumadin is:   Missed Coumadin Doses:  None  Medication Changes:  no  Dietary Changes:  no    Symptoms: without any bleeding. Anticoagulation Summary  As of 10/5/2020    INR goal:   2.0-3.0   TTR:   88.7 % (10.6 mo)   INR used for dosin.3 (10/5/2020)   Warfarin maintenance plan:   7.5 mg (5 mg x 1.5) every Sun, Thu, Sat; 5 mg (5 mg x 1) all other days   Weekly warfarin total:   42.5 mg   Plan last modified:   Sherie Anderson MD (2019)   Next INR check:   2020   Target end date:            Anticoagulation Episode Summary     INR check location:   Clinic Lab    Preferred lab:   [facility]    Send INR reminders to:       Comments:             Description    Stay on current dose per Dr. Glenda Chavez.  Follow-up in 1 month

## 2020-11-19 ENCOUNTER — CLINICAL SUPPORT (OUTPATIENT)
Dept: FAMILY MEDICINE CLINIC | Age: 85
End: 2020-11-19
Payer: MEDICARE

## 2020-11-19 VITALS — BODY MASS INDEX: 29.45 KG/M2 | WEIGHT: 150.8 LBS

## 2020-11-19 DIAGNOSIS — Z79.01 ANTICOAGULATION MONITORING, INR RANGE 2-3: Primary | ICD-10-CM

## 2020-11-19 DIAGNOSIS — I48.0 PAROXYSMAL ATRIAL FIBRILLATION (HCC): ICD-10-CM

## 2020-11-19 LAB
INR BLD: 2.5
PT POC: 29.7 SECONDS
VALID INTERNAL CONTROL?: YES

## 2020-11-19 PROCEDURE — 85610 PROTHROMBIN TIME: CPT | Performed by: FAMILY MEDICINE

## 2020-11-19 NOTE — PROGRESS NOTES
Susy Olivia is a 80 y.o. female who presents today for Anticoagulation monitoring. Indication: Atrial Fibrillation  INR Goal: 2.0-3.0. Current dose:  Coumadin 5 mg daily, 7.5 mg Sun/Thurs/Sat. Missed Coumadin Doses:  None  Medication Changes:  no  Dietary Changes:  no    Symptoms: taking coumadin appropriately without any bleeding. Latest INRs:  Lab Results   Component Value Date/Time    INR 2.2 (H) 06/22/2020 10:31 AM    INR 3.1 (H) 04/23/2014 11:30 AM    INR (POC) 1.4 (H) 02/16/2018 11:40 AM    INR, External 1.9 04/21/2015 12:48 PM    INR POC 2.5 11/19/2020 10:07 AM    INR POC 2.3 10/05/2020 11:10 AM    INR POC 2.6 (A) 09/03/2020 10:06 AM    Prothrombin time 22.8 (H) 06/22/2020 10:31 AM    Prothrombin time 31.6 (H) 04/23/2014 11:30 AM        New Coumadin dose:.current treatment plan is effective, no change in therapy. Next check to be scheduled for  4 weeks.

## 2020-12-21 ENCOUNTER — CLINICAL SUPPORT (OUTPATIENT)
Dept: FAMILY MEDICINE CLINIC | Age: 85
End: 2020-12-21
Payer: MEDICARE

## 2020-12-21 VITALS
TEMPERATURE: 97.6 F | OXYGEN SATURATION: 98 % | HEIGHT: 60 IN | SYSTOLIC BLOOD PRESSURE: 138 MMHG | BODY MASS INDEX: 30.63 KG/M2 | HEART RATE: 70 BPM | RESPIRATION RATE: 18 BRPM | DIASTOLIC BLOOD PRESSURE: 69 MMHG | WEIGHT: 156 LBS

## 2020-12-21 DIAGNOSIS — Z79.01 ANTICOAGULATION MONITORING, INR RANGE 2-3: Primary | ICD-10-CM

## 2020-12-21 LAB
INR BLD: 2.6
PT POC: 31.5 SECONDS
VALID INTERNAL CONTROL?: YES

## 2020-12-21 PROCEDURE — 85610 PROTHROMBIN TIME: CPT | Performed by: FAMILY MEDICINE

## 2020-12-21 NOTE — PROGRESS NOTES
[place] Matthew Sorensen is a 80 y.o. female  Chief Complaint   Patient presents with    Coagulation disorder     Health Maintenance Due   Topic Date Due    GLAUCOMA SCREENING Q2Y  05/27/2017     Visit Vitals  /69   Pulse 70   Temp 97.6 °F (36.4 °C) (Temporal)   Resp 18   Ht 5' (1.524 m)   Wt 156 lb (70.8 kg)   SpO2 98%   BMI 30.47 kg/m²     1. Have you been to the ER, urgent care clinic since your last visit? Hospitalized since your last visit? no    2. Have you seen or consulted any other health care providers outside of the [address] [place] [address] since your last visit? Include any pap smears or colon screening.  No    .Colton Berger

## 2020-12-31 DIAGNOSIS — J40 BRONCHITIS: ICD-10-CM

## 2020-12-31 RX ORDER — DOXAZOSIN 8 MG/1
TABLET ORAL
Qty: 45 TAB | Refills: 0 | Status: SHIPPED | OUTPATIENT
Start: 2020-12-31 | End: 2021-03-31

## 2020-12-31 RX ORDER — HYDROCHLOROTHIAZIDE 25 MG/1
TABLET ORAL
Qty: 90 TAB | Refills: 0 | Status: SHIPPED | OUTPATIENT
Start: 2020-12-31 | End: 2021-03-31

## 2021-01-21 ENCOUNTER — CLINICAL SUPPORT (OUTPATIENT)
Dept: FAMILY MEDICINE CLINIC | Age: 86
End: 2021-01-21
Payer: MEDICARE

## 2021-01-21 VITALS — HEIGHT: 60 IN | WEIGHT: 156.8 LBS | BODY MASS INDEX: 30.78 KG/M2

## 2021-01-21 DIAGNOSIS — I48.0 PAROXYSMAL ATRIAL FIBRILLATION (HCC): ICD-10-CM

## 2021-01-21 DIAGNOSIS — Z79.01 ANTICOAGULATION MONITORING, INR RANGE 2-3: Primary | ICD-10-CM

## 2021-01-21 LAB
INR BLD: 3.3
PT POC: 39.1 SECONDS
VALID INTERNAL CONTROL?: YES

## 2021-01-21 PROCEDURE — 85610 PROTHROMBIN TIME: CPT | Performed by: FAMILY MEDICINE

## 2021-01-21 NOTE — PROGRESS NOTES
Fidelina Garcia is a 80 y.o. female who presents today for Anticoagulation monitoring. Indication: Atrial Fibrillation  INR Goal: 2.0-3.0. Current dose:  Coumadin 5 mg daily, except Mon/Tues/Wed/Fri 7.5 mg.  Missed Coumadin Doses:  None  Medication Changes:  no  Dietary Changes:  no    Symptoms: taking coumadin appropriately without any bleeding. Latest INRs:  Lab Results   Component Value Date/Time    INR 2.2 (H) 06/22/2020 10:31 AM    INR 3.1 (H) 04/23/2014 11:30 AM    INR (POC) 1.4 (H) 02/16/2018 11:40 AM    INR, External 1.9 04/21/2015 12:48 PM    INR POC 2.6 12/21/2020 10:02 AM    INR POC 2.5 11/19/2020 10:07 AM    INR POC 2.3 10/05/2020 11:10 AM    Prothrombin time 22.8 (H) 06/22/2020 10:31 AM    Prothrombin time 31.6 (H) 04/23/2014 11:30 AM        New Coumadin dose:.the following changes are made - per Anti-coag chart. Next check to be scheduled for  2 weeks.

## 2021-02-04 ENCOUNTER — CLINICAL SUPPORT (OUTPATIENT)
Dept: FAMILY MEDICINE CLINIC | Age: 86
End: 2021-02-04
Payer: MEDICARE

## 2021-02-04 DIAGNOSIS — Z79.01 ANTICOAGULATION MONITORING, INR RANGE 2-3: Primary | ICD-10-CM

## 2021-02-04 DIAGNOSIS — I48.0 PAROXYSMAL ATRIAL FIBRILLATION (HCC): ICD-10-CM

## 2021-02-04 LAB
INR BLD: 2.3 (ref 1–1.5)
PT POC: 27.9 SECONDS (ref 9.1–12)
VALID INTERNAL CONTROL?: YES

## 2021-02-04 PROCEDURE — 85610 PROTHROMBIN TIME: CPT | Performed by: FAMILY MEDICINE

## 2021-02-04 NOTE — PROGRESS NOTES
Chief Complaint   Patient presents with    Coagulation disorder     nurse visit PT/INR check      Health Maintenance reviewed     1. Have you been to the ER, urgent care clinic since your last visit? Hospitalized since your last visit? No     2. Have you seen or consulted any other health care providers outside of the [address] [place] [address] since your last visit? Include any pap smears or colon screening.   No     See anti-coag track

## 2021-02-26 RX ORDER — LEVOTHYROXINE SODIUM 50 UG/1
TABLET ORAL
Qty: 90 TAB | Refills: 0 | Status: SHIPPED | OUTPATIENT
Start: 2021-02-26 | End: 2021-05-25

## 2021-02-26 RX ORDER — WARFARIN SODIUM 5 MG/1
TABLET ORAL
Qty: 180 TAB | Refills: 0 | Status: SHIPPED | OUTPATIENT
Start: 2021-02-26 | End: 2022-05-17 | Stop reason: SDUPTHER

## 2021-03-04 ENCOUNTER — CLINICAL SUPPORT (OUTPATIENT)
Dept: FAMILY MEDICINE CLINIC | Age: 86
End: 2021-03-04
Payer: MEDICARE

## 2021-03-04 DIAGNOSIS — Z79.01 ANTICOAGULATION MONITORING, INR RANGE 2-3: Primary | ICD-10-CM

## 2021-03-04 LAB
INR BLD: 2.3 (ref 1–1.5)
PT POC: 27.3 SECONDS (ref 9.1–12)
VALID INTERNAL CONTROL?: YES

## 2021-03-04 PROCEDURE — 85610 PROTHROMBIN TIME: CPT | Performed by: FAMILY MEDICINE

## 2021-03-18 RX ORDER — AMLODIPINE BESYLATE 5 MG/1
TABLET ORAL
Qty: 90 TAB | Refills: 2 | Status: SHIPPED
Start: 2021-03-18 | End: 2021-08-19 | Stop reason: ALTCHOICE

## 2021-03-25 ENCOUNTER — OFFICE VISIT (OUTPATIENT)
Dept: FAMILY MEDICINE CLINIC | Age: 86
End: 2021-03-25
Payer: MEDICARE

## 2021-03-25 VITALS
HEART RATE: 69 BPM | OXYGEN SATURATION: 98 % | HEIGHT: 61 IN | DIASTOLIC BLOOD PRESSURE: 63 MMHG | BODY MASS INDEX: 29.6 KG/M2 | WEIGHT: 156.8 LBS | SYSTOLIC BLOOD PRESSURE: 130 MMHG | RESPIRATION RATE: 17 BRPM | TEMPERATURE: 97.3 F

## 2021-03-25 DIAGNOSIS — H00.14 CHALAZION OF LEFT UPPER EYELID: Primary | ICD-10-CM

## 2021-03-25 DIAGNOSIS — I27.20 PULMONARY HYPERTENSION (HCC): ICD-10-CM

## 2021-03-25 DIAGNOSIS — I48.0 PAROXYSMAL ATRIAL FIBRILLATION (HCC): ICD-10-CM

## 2021-03-25 LAB
INR BLD: 2.2 (ref 1–1.5)
PT POC: 26 SECONDS (ref 9.1–12)
VALID INTERNAL CONTROL?: YES

## 2021-03-25 PROCEDURE — G8510 SCR DEP NEG, NO PLAN REQD: HCPCS | Performed by: FAMILY MEDICINE

## 2021-03-25 PROCEDURE — G0463 HOSPITAL OUTPT CLINIC VISIT: HCPCS | Performed by: FAMILY MEDICINE

## 2021-03-25 PROCEDURE — 1101F PT FALLS ASSESS-DOCD LE1/YR: CPT | Performed by: FAMILY MEDICINE

## 2021-03-25 PROCEDURE — 1090F PRES/ABSN URINE INCON ASSESS: CPT | Performed by: FAMILY MEDICINE

## 2021-03-25 PROCEDURE — 99213 OFFICE O/P EST LOW 20 MIN: CPT | Performed by: FAMILY MEDICINE

## 2021-03-25 PROCEDURE — 85610 PROTHROMBIN TIME: CPT | Performed by: FAMILY MEDICINE

## 2021-03-25 PROCEDURE — G8417 CALC BMI ABV UP PARAM F/U: HCPCS | Performed by: FAMILY MEDICINE

## 2021-03-25 PROCEDURE — G8427 DOCREV CUR MEDS BY ELIG CLIN: HCPCS | Performed by: FAMILY MEDICINE

## 2021-03-25 PROCEDURE — G8536 NO DOC ELDER MAL SCRN: HCPCS | Performed by: FAMILY MEDICINE

## 2021-03-25 RX ORDER — ERYTHROMYCIN 5 MG/G
OINTMENT OPHTHALMIC
Qty: 3.5 G | Refills: 1 | Status: SHIPPED | OUTPATIENT
Start: 2021-03-25 | End: 2021-08-19 | Stop reason: ALTCHOICE

## 2021-03-25 RX ORDER — METOPROLOL SUCCINATE 25 MG/1
25 TABLET, EXTENDED RELEASE ORAL
COMMUNITY
Start: 2021-03-04 | End: 2021-08-19 | Stop reason: ALTCHOICE

## 2021-03-25 NOTE — PROGRESS NOTES
Chief Complaint   Patient presents with    Eye Swelling     Pt reports that she has had left upper eyelid swelling and pain for 3-4 days. Pt also reports several mouth ulcers. Subjective: (As above and below)     Chief Complaint   Patient presents with    Eye Swelling     she is a 80y.o. year old female who presents for evaluation. Reviewed PmHx, RxHx, FmHx, SocHx, AllgHx and updated in chart. Review of Systems - negative except as listed above    Objective:     Vitals:    03/25/21 1107 03/25/21 1114   BP: (!) 146/65 130/63   Pulse: 69    Resp: 17    Temp: 97.3 °F (36.3 °C)    TempSrc: Temporal    SpO2: 98%    Weight: 156 lb 12.8 oz (71.1 kg)    Height: 5' 1\" (1.549 m)      Physical Examination: General appearance - alert, well appearing, and in no distress  Mental status - normal mood, behavior, speech, dress, motor activity, and thought processes  Eyes - left upper eyelid swollen and red, tender to touch, sty visible along upper inner eyelid  Ears - bilateral TM's and external ear canals normal  Mouth - mucous membranes moist, pharynx normal without lesions  Chest - clear to auscultation, no wheezes, rales or rhonchi, symmetric air entry  Heart - normal rate, regular rhythm, normal S1, S2, no murmurs, rubs, clicks or gallops  Musculoskeletal - no joint tenderness, deformity or swelling  Extremities - peripheral pulses normal, no pedal edema, no clubbing or cyanosis    Assessment/ Plan:   1. Pulmonary hypertension (HCC)  -stable, continue on current medication    2. Chalazion of left upper eyelid  -use ointment as written, warm compresses       I have discussed the diagnosis with the patient and the intended plan as seen in the above orders. The patient has received an after-visit summary and questions were answered concerning future plans.      Medication Side Effects and Warnings were discussed with patient: yes  Patient Labs were reviewed: yes  Patient Past Records were reviewed:  yes    Lamine Schuler STARLA Anderson.

## 2021-03-25 NOTE — PROGRESS NOTES
1. Have you been to the ER, urgent care clinic since your last visit? Hospitalized since your last visit? No    2. Have you seen or consulted any other health care providers outside of the [address] [place] [address] since your last visit? Include any pap smears or colon screening.  No    Health Maintenance Due   Topic Date Due    COVID-19 Vaccine (1) Never done     Chief Complaint   Patient presents with    Eye Swelling

## 2021-03-25 NOTE — PATIENT INSTRUCTIONS
Styes and Chalazia: Care Instructions Your Care Instructions Styes and chalazia (say \"stx-QWK-rcb-uh\") are both conditions that can cause swelling of the eyelid. A stye is an infection in the root of an eyelash. The infection causes a tender red lump on the edge of the eyelid. The infection can spread until the whole eyelid becomes red and inflamed. Styes usually break open, and a tiny amount of pus drains. They usually clear up on their own in about a week, but they sometimes need treatment with antibiotics. A chalazion is a lump or cyst in the eyelid (chalazion is singular; chalazia is plural). It is caused by swelling and inflammation of deep oil glands inside the eyelid. Chalazia are usually not infected. They can take a few months to heal. 
If a chalazion becomes more swollen and painful or does not go away, you may need to have it drained by your doctor. Follow-up care is a key part of your treatment and safety. Be sure to make and go to all appointments, and call your doctor if you are having problems. It's also a good idea to know your test results and keep a list of the medicines you take. How can you care for yourself at home? · Do not rub your eyes. Do not squeeze or try to open a stye or chalazion. · To help a stye or chalazion heal faster: 
? Put a warm, moist compress on your eye for 5 to 10 minutes, 3 to 6 times a day. Heat often brings a stye to a point where it drains on its own. Keep in mind that warm compresses will often increase swelling a little at first. 
? Do not use hot water or heat a wet cloth in a microwave oven. The compress may get too hot and can burn the eyelid. · Always wash your hands before and after you use a compress or touch your eyes. · If the doctor gave you antibiotic drops or ointment, use the medicine exactly as directed. Use the medicine for as long as instructed, even if your eye starts to feel better. · To put in eyedrops or ointment: ? Tilt your head back, and pull your lower eyelid down with one finger. ? Drop or squirt the medicine inside the lower lid. ? Close your eye for 30 to 60 seconds to let the drops or ointment move around. ? Do not touch the ointment or dropper tip to your eyelashes or any other surface. · Do not wear eye makeup or contact lenses until the stye or chalazion heals. · Do not share towels, pillows, or washcloths while you have a stye. When should you call for help? Call your doctor now or seek immediate medical care if: 
  · You have pain in your eye.  
  · You have a change in vision or loss of vision.  
  · Redness and swelling get much worse. Watch closely for changes in your health, and be sure to contact your doctor if: 
  · Your stye does not get better in 1 week.  
  · Your chalazion does not start to get better after several weeks. Where can you learn more? Go to http://www.gray.com/ Enter T086 in the search box to learn more about \"Styes and Chalazia: Care Instructions. \" Current as of: December 18, 2019               Content Version: 12.6 © 3844-4136 [employer]. Care instructions adapted under license by [employer] (which disclaims liability or warranty for this information). If you have questions about a medical condition or this instruction, always ask your healthcare professional. [address] any warranty or liability for your use of this information.

## 2021-04-01 RX ORDER — DOXAZOSIN 8 MG/1
TABLET ORAL
Qty: 45 TAB | Refills: 0 | Status: SHIPPED | OUTPATIENT
Start: 2021-04-01 | End: 2021-12-23

## 2021-04-26 ENCOUNTER — CLINICAL SUPPORT (OUTPATIENT)
Dept: FAMILY MEDICINE CLINIC | Age: 86
End: 2021-04-26
Payer: MEDICARE

## 2021-04-26 DIAGNOSIS — I48.0 PAROXYSMAL ATRIAL FIBRILLATION (HCC): ICD-10-CM

## 2021-04-26 DIAGNOSIS — Z79.01 ANTICOAGULATION MONITORING, INR RANGE 2-3: Primary | ICD-10-CM

## 2021-04-26 LAB
INR BLD: 2.7 (ref 1–1.5)
PT POC: 34.7 SECONDS (ref 9.1–12)
VALID INTERNAL CONTROL?: YES

## 2021-04-26 PROCEDURE — 85610 PROTHROMBIN TIME: CPT | Performed by: FAMILY MEDICINE

## 2021-05-27 ENCOUNTER — CLINICAL SUPPORT (OUTPATIENT)
Dept: FAMILY MEDICINE CLINIC | Age: 86
End: 2021-05-27
Payer: MEDICARE

## 2021-05-27 DIAGNOSIS — Z79.01 ANTICOAGULATION MONITORING, INR RANGE 2-3: Primary | ICD-10-CM

## 2021-05-27 LAB
INR BLD: 1.9 (ref 1–1.5)
PT POC: 22.3 SECONDS (ref 9.1–12)
VALID INTERNAL CONTROL?: YES

## 2021-05-27 PROCEDURE — 85610 PROTHROMBIN TIME: CPT | Performed by: FAMILY MEDICINE

## 2021-06-24 ENCOUNTER — CLINICAL SUPPORT (OUTPATIENT)
Dept: FAMILY MEDICINE CLINIC | Age: 86
End: 2021-06-24
Payer: MEDICARE

## 2021-06-24 DIAGNOSIS — Z79.01 ANTICOAGULATION MONITORING, INR RANGE 2-3: Primary | ICD-10-CM

## 2021-06-24 LAB
INR BLD: 2.6 (ref 1–1.5)
PT POC: 31.5 SECONDS (ref 9.1–12)
VALID INTERNAL CONTROL?: YES

## 2021-06-24 PROCEDURE — 85610 PROTHROMBIN TIME: CPT | Performed by: FAMILY MEDICINE

## 2021-07-22 ENCOUNTER — CLINICAL SUPPORT (OUTPATIENT)
Dept: FAMILY MEDICINE CLINIC | Age: 86
End: 2021-07-22
Payer: MEDICARE

## 2021-07-22 DIAGNOSIS — Z79.01 ANTICOAGULATION MONITORING, INR RANGE 2-3: Primary | ICD-10-CM

## 2021-07-22 LAB
INR BLD: 2.7 (ref 1–1.5)
PT POC: 32.9 SECONDS (ref 9.1–12)
VALID INTERNAL CONTROL?: YES

## 2021-07-22 PROCEDURE — 85610 PROTHROMBIN TIME: CPT | Performed by: FAMILY MEDICINE

## 2021-07-22 NOTE — PROGRESS NOTES
Chief Complaint   Patient presents with    Coagulation disorder     nurse visit      Health Maintenance reviewed     1. Have you been to the ER, urgent care clinic since your last visit? Hospitalized since your last visit? No     2. Have you seen or consulted any other health care providers outside of the [address] [place] [address] since your last visit? Include any pap smears or colon screening.  No

## 2021-08-19 ENCOUNTER — OFFICE VISIT (OUTPATIENT)
Dept: FAMILY MEDICINE CLINIC | Age: 86
End: 2021-08-19
Payer: MEDICARE

## 2021-08-19 VITALS
RESPIRATION RATE: 18 BRPM | DIASTOLIC BLOOD PRESSURE: 52 MMHG | BODY MASS INDEX: 28.51 KG/M2 | TEMPERATURE: 98.3 F | HEART RATE: 78 BPM | SYSTOLIC BLOOD PRESSURE: 160 MMHG | WEIGHT: 151 LBS | HEIGHT: 61 IN | OXYGEN SATURATION: 97 %

## 2021-08-19 DIAGNOSIS — R53.83 FATIGUE, UNSPECIFIED TYPE: ICD-10-CM

## 2021-08-19 DIAGNOSIS — R79.9 ABNORMAL FINDING OF BLOOD CHEMISTRY, UNSPECIFIED: ICD-10-CM

## 2021-08-19 DIAGNOSIS — E03.9 ACQUIRED HYPOTHYROIDISM: ICD-10-CM

## 2021-08-19 DIAGNOSIS — E55.9 VITAMIN D DEFICIENCY, UNSPECIFIED: ICD-10-CM

## 2021-08-19 DIAGNOSIS — R68.89 OTHER GENERAL SYMPTOMS AND SIGNS: ICD-10-CM

## 2021-08-19 DIAGNOSIS — E78.2 MIXED HYPERLIPIDEMIA: ICD-10-CM

## 2021-08-19 DIAGNOSIS — I48.11 LONGSTANDING PERSISTENT ATRIAL FIBRILLATION (HCC): ICD-10-CM

## 2021-08-19 DIAGNOSIS — I10 ESSENTIAL HYPERTENSION: Primary | ICD-10-CM

## 2021-08-19 LAB
INR BLD: 2.2
PT POC: NORMAL
VALID INTERNAL CONTROL?: YES

## 2021-08-19 PROCEDURE — G8432 DEP SCR NOT DOC, RNG: HCPCS | Performed by: FAMILY MEDICINE

## 2021-08-19 PROCEDURE — 1090F PRES/ABSN URINE INCON ASSESS: CPT | Performed by: FAMILY MEDICINE

## 2021-08-19 PROCEDURE — G0463 HOSPITAL OUTPT CLINIC VISIT: HCPCS | Performed by: FAMILY MEDICINE

## 2021-08-19 PROCEDURE — G8427 DOCREV CUR MEDS BY ELIG CLIN: HCPCS | Performed by: FAMILY MEDICINE

## 2021-08-19 PROCEDURE — G8417 CALC BMI ABV UP PARAM F/U: HCPCS | Performed by: FAMILY MEDICINE

## 2021-08-19 PROCEDURE — 85610 PROTHROMBIN TIME: CPT | Performed by: FAMILY MEDICINE

## 2021-08-19 PROCEDURE — 99214 OFFICE O/P EST MOD 30 MIN: CPT | Performed by: FAMILY MEDICINE

## 2021-08-19 PROCEDURE — G8536 NO DOC ELDER MAL SCRN: HCPCS | Performed by: FAMILY MEDICINE

## 2021-08-19 PROCEDURE — 1101F PT FALLS ASSESS-DOCD LE1/YR: CPT | Performed by: FAMILY MEDICINE

## 2021-08-19 RX ORDER — DILTIAZEM HYDROCHLORIDE 180 MG/1
180 CAPSULE, EXTENDED RELEASE ORAL DAILY
COMMUNITY
Start: 2021-06-29

## 2021-08-19 NOTE — PROGRESS NOTES
Chief Complaint   Patient presents with    Fatigue    Extremity Weakness     Pt had a routine visit with cardiologist in March, flecainide was change to diltiazem. Pt has since followed up with NP at cardiology x 2    Pt feels like she has been fatigued and does not have the energy she used since she was changed to diltiazem. Pt still lives alone, is able to provide all of her own care. Pt is a night owl, stays up late, does not get much sleep. Subjective: (As above and below)     Chief Complaint   Patient presents with    Fatigue    Extremity Weakness     she is a 80y.o. year old female who presents for evaluation. Reviewed PmHx, RxHx, FmHx, SocHx, AllgHx and updated in chart. Review of Systems - negative except as listed above    Objective:     Vitals:    08/19/21 1059   BP: (!) 160/52   Pulse: 78   Resp: 18   Temp: 98.3 °F (36.8 °C)   TempSrc: Temporal   SpO2: 97%   Weight: 151 lb (68.5 kg)   Height: 5' 1\" (1.549 m)     Physical Examination: General appearance - alert, well appearing, and in no distress  Mental status - normal mood, behavior, speech, dress, motor activity, and thought processes  Ears - bilateral TM's and external ear canals normal  Chest - clear to auscultation, no wheezes, rales or rhonchi, symmetric air entry  Heart - normal rate, regular rhythm, normal S1, S2, no murmurs, rubs, clicks or gallops  Musculoskeletal - no joint tenderness, deformity or swelling  Extremities - peripheral pulses normal, no pedal edema, no clubbing or cyanosis    Assessment/ Plan:   1. Essential hypertension  -stable for patient    2. Longstanding persistent atrial fibrillation ([address]  -managed by cardiology    3. Acquired hypothyroidism  - TSH 3RD GENERATION; Future    4. Mixed hyperlipidemia  - METABOLIC PANEL, COMPREHENSIVE; Future  - LIPID PANEL; Future    5. Fatigue, unspecified type  -very bothersome to patient   - CBC W/O DIFF; Future  - HEMOGLOBIN A1C WITH EAG;  Future  - VITAMIN D, 25 HYDROXY; Future  - VITAMIN B12; Future    6. Abnormal finding of blood chemistry, unspecified   - HEMOGLOBIN A1C WITH EAG; Future    7. Vitamin D deficiency, unspecified   - VITAMIN D, 25 HYDROXY; Future    8. Other general symptoms and signs   - VITAMIN B12; Future       I have discussed the diagnosis with the patient and the intended plan as seen in the above orders. The patient has received an after-visit summary and questions were answered concerning future plans.      Medication Side Effects and Warnings were discussed with patient: yes  Patient Labs were reviewed: yes  Patient Past Records were reviewed:  yes    Brady Sams M.D.

## 2021-08-19 NOTE — PROGRESS NOTES
Chief Complaint   Patient presents with    Fatigue    Extremity Weakness     Health Maintenance reviewed     1. Have you been to the ER, urgent care clinic since your last visit? Hospitalized since your last visit? No     2. Have you seen or consulted any other health care providers outside of the [address] [place] [address] since your last visit? Include any pap smears or colon screening.   Yes, VCS

## 2021-08-20 LAB
25(OH)D3 SERPL-MCNC: 32.6 NG/ML (ref 30–100)
ALBUMIN SERPL-MCNC: 4.2 G/DL (ref 3.5–5)
ALBUMIN/GLOB SERPL: 1.3 {RATIO} (ref 1.1–2.2)
ALP SERPL-CCNC: 83 U/L (ref 45–117)
ALT SERPL-CCNC: 46 U/L (ref 12–78)
ANION GAP SERPL CALC-SCNC: 5 MMOL/L (ref 5–15)
AST SERPL-CCNC: 26 U/L (ref 15–37)
BILIRUB SERPL-MCNC: 0.5 MG/DL (ref 0.2–1)
BUN SERPL-MCNC: 30 MG/DL (ref 6–20)
BUN/CREAT SERPL: 35 (ref 12–20)
CALCIUM SERPL-MCNC: 9.8 MG/DL (ref 8.5–10.1)
CHLORIDE SERPL-SCNC: 106 MMOL/L (ref 97–108)
CHOLEST SERPL-MCNC: 193 MG/DL
CO2 SERPL-SCNC: 29 MMOL/L (ref 21–32)
CREAT SERPL-MCNC: 0.85 MG/DL (ref 0.55–1.02)
ERYTHROCYTE [DISTWIDTH] IN BLOOD BY AUTOMATED COUNT: 14.6 % (ref 11.5–14.5)
EST. AVERAGE GLUCOSE BLD GHB EST-MCNC: 114 MG/DL
GLOBULIN SER CALC-MCNC: 3.2 G/DL (ref 2–4)
GLUCOSE SERPL-MCNC: 96 MG/DL (ref 65–100)
HBA1C MFR BLD: 5.6 % (ref 4–5.6)
HCT VFR BLD AUTO: 39.8 % (ref 35–47)
HDLC SERPL-MCNC: 74 MG/DL
HDLC SERPL: 2.6 {RATIO} (ref 0–5)
HGB BLD-MCNC: 12.8 G/DL (ref 11.5–16)
LDLC SERPL CALC-MCNC: 91.4 MG/DL (ref 0–100)
MCH RBC QN AUTO: 31.3 PG (ref 26–34)
MCHC RBC AUTO-ENTMCNC: 32.2 G/DL (ref 30–36.5)
MCV RBC AUTO: 97.3 FL (ref 80–99)
NRBC # BLD: 0 K/UL (ref 0–0.01)
NRBC BLD-RTO: 0 PER 100 WBC
PLATELET # BLD AUTO: 184 K/UL (ref 150–400)
PMV BLD AUTO: 12 FL (ref 8.9–12.9)
POTASSIUM SERPL-SCNC: 4.4 MMOL/L (ref 3.5–5.1)
PROT SERPL-MCNC: 7.4 G/DL (ref 6.4–8.2)
RBC # BLD AUTO: 4.09 M/UL (ref 3.8–5.2)
SODIUM SERPL-SCNC: 140 MMOL/L (ref 136–145)
TRIGL SERPL-MCNC: 138 MG/DL (ref ?–150)
TSH SERPL DL<=0.05 MIU/L-ACNC: 1.26 UIU/ML (ref 0.36–3.74)
VIT B12 SERPL-MCNC: 361 PG/ML (ref 193–986)
VLDLC SERPL CALC-MCNC: 27.6 MG/DL
WBC # BLD AUTO: 7.2 K/UL (ref 3.6–11)

## 2021-09-15 ENCOUNTER — CLINICAL SUPPORT (OUTPATIENT)
Dept: FAMILY MEDICINE CLINIC | Age: 86
End: 2021-09-15
Payer: MEDICARE

## 2021-09-15 DIAGNOSIS — Z79.01 ANTICOAGULATION MONITORING, INR RANGE 2-3: Primary | ICD-10-CM

## 2021-09-15 LAB
INR BLD: 2.4 (ref 1–1.5)
PT POC: 28.6 SECONDS (ref 9.1–12)
VALID INTERNAL CONTROL?: YES

## 2021-09-15 PROCEDURE — 85610 PROTHROMBIN TIME: CPT | Performed by: FAMILY MEDICINE

## 2021-09-24 DIAGNOSIS — J40 BRONCHITIS: ICD-10-CM

## 2021-09-24 RX ORDER — HYDROCHLOROTHIAZIDE 25 MG/1
TABLET ORAL
Qty: 90 TABLET | Refills: 1 | Status: SHIPPED | OUTPATIENT
Start: 2021-09-24 | End: 2022-03-27

## 2021-10-14 ENCOUNTER — CLINICAL SUPPORT (OUTPATIENT)
Dept: FAMILY MEDICINE CLINIC | Age: 86
End: 2021-10-14
Payer: MEDICARE

## 2021-10-14 VITALS — TEMPERATURE: 98.1 F

## 2021-10-14 DIAGNOSIS — Z23 ENCOUNTER FOR IMMUNIZATION: Primary | ICD-10-CM

## 2021-10-14 DIAGNOSIS — I48.11 LONGSTANDING PERSISTENT ATRIAL FIBRILLATION (HCC): ICD-10-CM

## 2021-10-14 LAB
INR BLD: 2.1 (ref 1–1.5)
PT POC: 28 SECONDS (ref 9.1–12)
VALID INTERNAL CONTROL?: YES

## 2021-10-14 PROCEDURE — 85610 PROTHROMBIN TIME: CPT | Performed by: FAMILY MEDICINE

## 2021-10-14 PROCEDURE — 90694 VACC AIIV4 NO PRSRV 0.5ML IM: CPT | Performed by: FAMILY MEDICINE

## 2021-10-14 NOTE — PROGRESS NOTES
Alexsander Solano is a 80 y.o. female who presents for routine immunizations. She denies any symptoms , reactions or allergies that would exclude them from being immunized today. Risks and adverse reactions were discussed and the VIS was given to them. All questions were addressed. She was observed for 15 min post injection. There were no reactions observed.     Abeba Sosa LPN

## 2021-10-14 NOTE — PATIENT INSTRUCTIONS
Vaccine Information Statement    Influenza (Flu) Vaccine (Inactivated or Recombinant): What You Need to Know    Many vaccine information statements are available in Italian and other languages. See www.immunize.org/vis. Hojas de información sobre vacunas están disponibles en español y en muchos otros idiomas. Visite www.immunize.org/vis. 1. Why get vaccinated? Influenza vaccine can prevent influenza (flu). Flu is a contagious disease that spreads around the [facility] every year, usually between October and May. Anyone can get the flu, but it is more dangerous for some people. Infants and young children, people 72 years and older, pregnant people, and people with certain health conditions or a weakened immune system are at greatest risk of flu complications. Pneumonia, bronchitis, sinus infections, and ear infections are examples of flu-related complications. If you have a medical condition, such as heart disease, cancer, or diabetes, flu can make it worse. Flu can cause fever and chills, sore throat, muscle aches, fatigue, cough, headache, and runny or stuffy nose. Some people may have vomiting and diarrhea, though this is more common in children than adults. In an average year, thousands of people in the [facility] die from flu, and many more are hospitalized. Flu vaccine prevents millions of illnesses and flu-related visits to the doctor each year. 2. Influenza vaccines     CDC recommends everyone 6 months and older get vaccinated every flu season. Children 6 months through 6years of age may need 2 doses during a single flu season. Everyone else needs only 1 dose each flu season. It takes about 2 weeks for protection to develop after vaccination. There are many flu viruses, and they are always changing. Each year a new flu vaccine is made to protect against the influenza viruses believed to be likely to cause disease in the upcoming flu season.  Even when the vaccine doesnt exactly match these viruses, it may still provide some protection. Influenza vaccine does not cause flu. Influenza vaccine may be given at the same time as other vaccines. 3. Talk with your health care provider    Tell your vaccination provider if the person getting the vaccine:   Has had an allergic reaction after a previous dose of influenza vaccine, or has any severe, life-threatening allergies    Has ever had Guillain-Barré Syndrome (also called GBS)    In some cases, your health care provider may decide to postpone influenza vaccination until a future visit. Influenza vaccine can be administered at any time during pregnancy. People who are or will be pregnant during influenza season should receive inactivated influenza vaccine. People with minor illnesses, such as a cold, may be vaccinated. People who are moderately or severely ill should usually wait until they recover before getting influenza vaccine. Your health care provider can give you more information. 4. Risks of a vaccine reaction     Soreness, redness, and swelling where the shot is given, fever, muscle aches, and headache can happen after influenza vaccination.  There may be a very small increased risk of Guillain-Barré Syndrome (GBS) after inactivated influenza vaccine (the flu shot). Rayradha Schneiderist children who get the flu shot along with pneumococcal vaccine (PCV13) and/or DTaP vaccine at the same time might be slightly more likely to have a seizure caused by fever. Tell your health care provider if a child who is getting flu vaccine has ever had a seizure. People sometimes faint after medical procedures, including vaccination. Tell your provider if you feel dizzy or have vision changes or ringing in the ears. As with any medicine, there is a very remote chance of a vaccine causing a severe allergic reaction, other serious injury, or death. 5. What if there is a serious problem?     An allergic reaction could occur after the vaccinated person leaves the clinic. If you see signs of a severe allergic reaction (hives, swelling of the face and throat, difficulty breathing, a fast heartbeat, dizziness, or weakness), call 9-1-1 and get the person to the nearest hospital.    For other signs that concern you, call your health care provider. Adverse reactions should be reported to the Vaccine Adverse Event Reporting System (VAERS). Your health care provider will usually file this report, or you can do it yourself. Visit the VAERS website at www.vaers. [facility].gov or call 1-856.292.8611. VAERS is only for reporting reactions, and VAERS staff members do not give medical advice. 6. The National Vaccine Injury Compensation Program    The [facility] Vaccine Injury Compensation Program (VICP) is a federal program that was created to compensate people who may have been injured by certain vaccines. Claims regarding alleged injury or death due to vaccination have a time limit for filing, which may be as short as two years. Visit the VICP website at www.[facility].gov/vaccinecompensation or call 2-811.873.8649 to learn about the program and about filing a claim. 7. How can I learn more?  Ask your health care provider.  Call your local or state health department.  Visit the website of the Food and Drug Administration (FDA) for vaccine package inserts and additional information at www.fda.gov/vaccines-blood-biologics/vaccines.  Contact the Centers for Disease Control and Prevention (CDC):  - Call 7-186.904.2538 (9-452-GUM-INFO) or  - Visit CDCs influenza website at www.cdc.gov/flu. Vaccine Information Statement   Inactivated Influenza Vaccine   8/6/2021  [address] 986ED-22   Department of Health and Human Services  Centers for Disease Control and Prevention    Office Use Only

## 2021-11-18 ENCOUNTER — OFFICE VISIT (OUTPATIENT)
Dept: FAMILY MEDICINE CLINIC | Age: 86
End: 2021-11-18
Payer: MEDICARE

## 2021-11-18 VITALS
DIASTOLIC BLOOD PRESSURE: 69 MMHG | HEART RATE: 61 BPM | RESPIRATION RATE: 16 BRPM | HEIGHT: 61 IN | TEMPERATURE: 98.2 F | SYSTOLIC BLOOD PRESSURE: 199 MMHG | BODY MASS INDEX: 29.23 KG/M2 | OXYGEN SATURATION: 98 % | WEIGHT: 154.8 LBS

## 2021-11-18 DIAGNOSIS — Z00.00 MEDICARE ANNUAL WELLNESS VISIT, SUBSEQUENT: ICD-10-CM

## 2021-11-18 DIAGNOSIS — Z79.01 ANTICOAGULATION MONITORING, INR RANGE 2-3: Primary | ICD-10-CM

## 2021-11-18 LAB
INR BLD: 3.1 (ref 1–1.5)
PT POC: 37.4 SECONDS (ref 9.1–12)
VALID INTERNAL CONTROL?: YES

## 2021-11-18 PROCEDURE — G8510 SCR DEP NEG, NO PLAN REQD: HCPCS | Performed by: FAMILY MEDICINE

## 2021-11-18 PROCEDURE — G8417 CALC BMI ABV UP PARAM F/U: HCPCS | Performed by: FAMILY MEDICINE

## 2021-11-18 PROCEDURE — 85610 PROTHROMBIN TIME: CPT | Performed by: FAMILY MEDICINE

## 2021-11-18 PROCEDURE — 1101F PT FALLS ASSESS-DOCD LE1/YR: CPT | Performed by: FAMILY MEDICINE

## 2021-11-18 PROCEDURE — G0439 PPPS, SUBSEQ VISIT: HCPCS | Performed by: FAMILY MEDICINE

## 2021-11-18 PROCEDURE — G8536 NO DOC ELDER MAL SCRN: HCPCS | Performed by: FAMILY MEDICINE

## 2021-11-18 PROCEDURE — G8427 DOCREV CUR MEDS BY ELIG CLIN: HCPCS | Performed by: FAMILY MEDICINE

## 2021-11-18 NOTE — PROGRESS NOTES
1. Have you been to the ER, urgent care clinic since your last visit? Hospitalized since your last visit? No    2. Have you seen or consulted any other health care providers outside of the [address] [place] [address] since your last visit? Include any pap smears or colon screening.  No    Health Maintenance Due   Topic Date Due    COVID-19 Vaccine (1) Never done    Shingrix Vaccine Age 50> (1 of 2) Never done    Medicare Yearly Exam  09/22/2021     Chief Complaint   Patient presents with    Hypertension     6 month f/u

## 2021-11-18 NOTE — PATIENT INSTRUCTIONS

## 2021-11-18 NOTE — PROGRESS NOTES
Chief Complaint   Patient presents with    Annual Wellness Visit     Pt is here today for INR and AWV. This is the Subsequent Medicare Annual Wellness Exam, performed 12 months or more after the Initial AWV or the last Subsequent AWV    I have reviewed the patient's medical history in detail and updated the computerized patient record. Assessment/Plan   Education and counseling provided:  Are appropriate based on today's review and evaluation    1. Anticoagulation monitoring, INR range 2-3  -     AMB POC PT/INR  2. Medicare annual wellness visit, subsequent       Depression Risk Factor Screening     3 most recent PHQ Screens 11/18/2021   Little interest or pleasure in doing things Not at all   Feeling down, depressed, irritable, or hopeless Not at all   Total Score PHQ 2 0       Alcohol Risk Screen    Do you average more than 1 drink per night or more than 7 drinks a week:  No    On any one occasion in the past three months have you have had more than 3 drinks containing alcohol:  No        Functional Ability and Level of Safety    Hearing: The patient needs further evaluation. Activities of Daily Living: The home contains: no safety equipment. Patient needs help with:  transportation      Ambulation: with no difficulty     Fall Risk:  Fall Risk Assessment, last 12 mths 11/18/2021   Able to walk? Yes   Fall in past 12 months? 0   Do you feel unsteady? 0   Are you worried about falling 0   Number of falls in past 12 months -   Fall with injury?  -      Abuse Screen:  Patient is not abused       Cognitive Screening    Has your family/caregiver stated any concerns about your memory: no      Health Maintenance Due     Health Maintenance Due   Topic Date Due    COVID-19 Vaccine (1) Never done    Shingrix Vaccine Age 50> (1 of 2) Never done       Patient Care Team   Patient Care Team:  Donavon Anderson MD as PCP - General (Family Medicine)  Donavon Anderson MD as PCP - [facility] Provider  Michael Lu MD (Cardiology)    History     Patient Active Problem List   Diagnosis Code    Hypertension I10    Atrial fibrillation ([facility] I48.91    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    Hypothyroid E03.9    Hyperlipidemia E78.5    Pulmonary hypertension ([facility] I27.20    Widened pulse pressure R09.89     Past Medical History:   Diagnosis Date    [facility]     Hyperlipidemia 6/18/2014    Hypertension     Hypothyroid 3/18/2014    [facility] ([facility] 2/1/2016 1/16 - PASP=68, EF 55%     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  levothyroxine (SYNTHROID) 50 mcg tablet TAKE ONE TABLET BY MOUTH EVERY MORNING BEFORE BREAKFAST 90 Tablet 1    doxazosin (CARDURA) 8 mg tablet TAKE 1/2 TABLET BY MOUTH DAILY 45 Tab 0    warfarin (COUMADIN) 5 mg tablet TAKE TWO TABLETS BY MOUTH DAILY 180 Tab 0    guaiFENesin ER (MUCINEX) 600 mg ER tablet Take 600 mg by mouth two (2) times a day.        No Known Allergies    Family History   Problem Relation Age of Onset    Cancer Mother      Social History     Tobacco Use    Smoking status: Never Smoker    Smokeless tobacco: Never Used   Substance Use Topics    Alcohol use: No         Whitney Muniz MD

## 2021-11-21 RX ORDER — LEVOTHYROXINE SODIUM 50 UG/1
TABLET ORAL
Qty: 90 TABLET | Refills: 1 | Status: SHIPPED | OUTPATIENT
Start: 2021-11-21 | End: 2022-05-20

## 2021-12-16 ENCOUNTER — CLINICAL SUPPORT (OUTPATIENT)
Dept: FAMILY MEDICINE CLINIC | Age: 86
End: 2021-12-16
Payer: MEDICARE

## 2021-12-16 DIAGNOSIS — Z79.01 ANTICOAGULATION MONITORING, INR RANGE 2-3: Primary | ICD-10-CM

## 2021-12-16 LAB
INR BLD: 3.6 (ref 1–1.5)
PT POC: 43.3 SECONDS (ref 9.1–12)
VALID INTERNAL CONTROL?: YES

## 2021-12-16 PROCEDURE — 85610 PROTHROMBIN TIME: CPT | Performed by: FAMILY MEDICINE

## 2022-01-06 ENCOUNTER — CLINICAL SUPPORT (OUTPATIENT)
Dept: FAMILY MEDICINE CLINIC | Age: 87
End: 2022-01-06
Payer: MEDICARE

## 2022-01-06 VITALS
BODY MASS INDEX: 29.07 KG/M2 | RESPIRATION RATE: 19 BRPM | HEIGHT: 61 IN | OXYGEN SATURATION: 98 % | TEMPERATURE: 98 F | HEART RATE: 62 BPM | WEIGHT: 154 LBS

## 2022-01-06 DIAGNOSIS — Z79.01 ANTICOAGULATION MONITORING, INR RANGE 2-3: Primary | ICD-10-CM

## 2022-01-06 DIAGNOSIS — B37.0 THRUSH: ICD-10-CM

## 2022-01-06 LAB
INR BLD: 2.4
PT POC: 28.8 SECONDS
VALID INTERNAL CONTROL?: YES

## 2022-01-06 PROCEDURE — G0463 HOSPITAL OUTPT CLINIC VISIT: HCPCS | Performed by: NURSE PRACTITIONER

## 2022-01-06 PROCEDURE — 85610 PROTHROMBIN TIME: CPT | Performed by: NURSE PRACTITIONER

## 2022-01-06 RX ORDER — NYSTATIN 100000 [USP'U]/ML
500000 SUSPENSION ORAL 4 TIMES DAILY
Qty: 473 ML | Refills: 1 | Status: SHIPPED | OUTPATIENT
Start: 2022-01-06 | End: 2022-01-16

## 2022-01-06 NOTE — PROGRESS NOTES
Room: 12    Identified pt with two pt identifiers(name and ). Reviewed record in preparation for visit and have obtained necessary documentation. All patient medications has been reviewed. Chief Complaint   Patient presents with    Coagulation disorder   Cornelia Libyan     pt wants refill of Nystatin       Health Maintenance Due   Topic    COVID-19 Vaccine (1)    Shingrix Vaccine Age 50> (1 of 2)       There were no vitals filed for this visit. 4.Have you been to the ER, urgent care clinic since your last visit? Hospitalized since your last visit? No    5. Have you seen or consulted any other health care providers outside of the [address] [place] [address] since your last visit? Include any pap smears or colon screening. No    Patient is accompanied by self and adult caretaker I have received verbal consent from Greer Ventura to discuss any/all medical information while they are present in the room.

## 2022-02-07 ENCOUNTER — CLINICAL SUPPORT (OUTPATIENT)
Dept: FAMILY MEDICINE CLINIC | Age: 87
End: 2022-02-07
Payer: MEDICARE

## 2022-02-07 DIAGNOSIS — Z79.01 ANTICOAGULATION MONITORING, INR RANGE 2-3: Primary | ICD-10-CM

## 2022-02-07 LAB
INR BLD: 2.1
PT POC: 25.8 SECONDS
VALID INTERNAL CONTROL?: YES

## 2022-02-07 PROCEDURE — 85610 PROTHROMBIN TIME: CPT | Performed by: NURSE PRACTITIONER

## 2022-02-07 NOTE — PROGRESS NOTES
Chief Complaint   Patient presents with    Anticoagulation     PT/INR check     Pts INR 2.1/PT 25.8 sec, goal 2-3. Pt taking warfarin 1.5 tab on Mondays, 1 pill the rest of the days. Notified Rani Dorantes, advised pt per Paula to return in 1 month for recheck, pt voiced understanding.

## 2022-03-07 ENCOUNTER — CLINICAL SUPPORT (OUTPATIENT)
Dept: FAMILY MEDICINE CLINIC | Age: 87
End: 2022-03-07
Payer: MEDICARE

## 2022-03-07 DIAGNOSIS — I48.0 PAROXYSMAL ATRIAL FIBRILLATION (HCC): ICD-10-CM

## 2022-03-07 DIAGNOSIS — Z79.01 ANTICOAGULATION MONITORING, INR RANGE 2-3: Primary | ICD-10-CM

## 2022-03-07 LAB
INR BLD: 2.9 (ref 1–1.5)
PT POC: 34.9 SECONDS (ref 9.1–12)
VALID INTERNAL CONTROL?: YES

## 2022-03-07 PROCEDURE — 85610 PROTHROMBIN TIME: CPT | Performed by: FAMILY MEDICINE

## 2022-03-19 PROBLEM — R09.89 WIDENED PULSE PRESSURE: Status: ACTIVE | Noted: 2017-09-29

## 2022-03-26 DIAGNOSIS — J40 BRONCHITIS: ICD-10-CM

## 2022-03-27 RX ORDER — HYDROCHLOROTHIAZIDE 25 MG/1
TABLET ORAL
Qty: 90 TABLET | Refills: 1 | Status: SHIPPED | OUTPATIENT
Start: 2022-03-27 | End: 2022-09-25

## 2022-04-11 ENCOUNTER — TELEPHONE (OUTPATIENT)
Dept: FAMILY MEDICINE CLINIC | Age: 87
End: 2022-04-11

## 2022-04-11 NOTE — TELEPHONE ENCOUNTER
----- Message from Jermaine Valentin sent at 4/11/2022 11:18 AM EDT -----  Subject: Message to Provider    QUESTIONS  Information for Provider? Patient's daughter, Vasyl Perla is requesting   call back from Carlita in [facility] office in regards to   updates on her mothers health  ---------------------------------------------------------------------------  --------------  [address]  What is the best way for the office to contact you? OK to leave message on   voicemail  Preferred Call Back Phone Number?  [phone]  ---------------------------------------------------------------------------  --------------  SCRIPT ANSWERS  undefined

## 2022-04-12 ENCOUNTER — CLINICAL SUPPORT (OUTPATIENT)
Dept: FAMILY MEDICINE CLINIC | Age: 87
End: 2022-04-12
Payer: MEDICARE

## 2022-04-12 DIAGNOSIS — I48.0 PAROXYSMAL ATRIAL FIBRILLATION (HCC): ICD-10-CM

## 2022-04-12 DIAGNOSIS — Z79.01 ANTICOAGULATION MONITORING, INR RANGE 2-3: Primary | ICD-10-CM

## 2022-04-12 LAB
INR BLD: 3.3 (ref 1–1.5)
PT POC: 40 SECONDS (ref 9.1–12)
VALID INTERNAL CONTROL?: YES

## 2022-04-12 PROCEDURE — 85610 PROTHROMBIN TIME: CPT | Performed by: FAMILY MEDICINE

## 2022-04-28 ENCOUNTER — CLINICAL SUPPORT (OUTPATIENT)
Dept: FAMILY MEDICINE CLINIC | Age: 87
End: 2022-04-28
Payer: MEDICARE

## 2022-04-28 DIAGNOSIS — Z79.01 ANTICOAGULATION MONITORING, INR RANGE 2-3: Primary | ICD-10-CM

## 2022-04-28 LAB
INR BLD: 2.1 (ref 1–1.5)
PT POC: 25.6 SECONDS (ref 9.1–12)
VALID INTERNAL CONTROL?: YES

## 2022-04-28 PROCEDURE — 85610 PROTHROMBIN TIME: CPT | Performed by: NURSE PRACTITIONER

## 2022-04-28 NOTE — PROGRESS NOTES
Tosha Florentino is a 80 y.o. female who presents today for Anticoagulation monitoring. INR Goal: Between 2-3  Current dose:  Coumadin 5 mg daily.   Medication Changes: No    Latest INRs:  Lab Results   Component Value Date/Time    INR 2.2 (H) 06/22/2020 10:31 AM    INR 3.1 (H) 04/23/2014 11:30 AM    INR (POC) 1.4 (H) 02/16/2018 11:40 AM    INR, External 1.9 04/21/2015 12:48 PM    INR POC 2.1 (A) 04/28/2022 10:10 AM    INR POC 3.3 (A) 04/12/2022 10:04 AM    INR POC 2.9 (A) 03/07/2022 10:12 AM    Prothrombin time 22.8 (H) 06/22/2020 10:31 AM    Prothrombin time 31.6 (H) 04/23/2014 11:30 AM

## 2022-05-12 ENCOUNTER — CLINICAL SUPPORT (OUTPATIENT)
Dept: FAMILY MEDICINE CLINIC | Age: 87
End: 2022-05-12
Payer: MEDICARE

## 2022-05-12 DIAGNOSIS — Z79.01 ANTICOAGULATION MONITORING, INR RANGE 2-3: Primary | ICD-10-CM

## 2022-05-12 LAB
INR BLD: 2.2 (ref 1–1.5)
PT POC: 27 SECONDS (ref 9.1–12)
VALID INTERNAL CONTROL?: YES

## 2022-05-12 PROCEDURE — 85610 PROTHROMBIN TIME: CPT | Performed by: NURSE PRACTITIONER

## 2022-05-12 NOTE — PROGRESS NOTES
Ariel Bell is a 80 y.o. female who presents today for Anticoagulation monitoring.     Latest INRs:  Lab Results   Component Value Date/Time    INR 2.2 (H) 06/22/2020 10:31 AM    INR 3.1 (H) 04/23/2014 11:30 AM    INR (POC) 1.4 (H) 02/16/2018 11:40 AM    INR, External 1.9 04/21/2015 12:48 PM    INR POC 2.1 (A) 04/28/2022 10:10 AM    INR POC 3.3 (A) 04/12/2022 10:04 AM    INR POC 2.9 (A) 03/07/2022 10:12 AM    Prothrombin time 22.8 (H) 06/22/2020 10:31 AM    Prothrombin time 31.6 (H) 04/23/2014 11:30 AM

## 2022-05-17 RX ORDER — WARFARIN SODIUM 5 MG/1
TABLET ORAL
Qty: 180 TABLET | Refills: 0 | OUTPATIENT
Start: 2022-05-17

## 2022-05-20 RX ORDER — LEVOTHYROXINE SODIUM 50 UG/1
TABLET ORAL
Qty: 90 TABLET | Refills: 1 | Status: SHIPPED | OUTPATIENT
Start: 2022-05-20

## 2022-06-09 ENCOUNTER — CLINICAL SUPPORT (OUTPATIENT)
Dept: FAMILY MEDICINE CLINIC | Age: 87
End: 2022-06-09
Payer: MEDICARE

## 2022-06-09 DIAGNOSIS — Z79.01 ANTICOAGULATION MONITORING, INR RANGE 2-3: Primary | ICD-10-CM

## 2022-06-09 LAB
INR BLD: 2.5 (ref 1–1.5)
PT POC: 30 SECONDS (ref 9.1–12)
VALID INTERNAL CONTROL?: YES

## 2022-06-09 PROCEDURE — 85610 PROTHROMBIN TIME: CPT | Performed by: NURSE PRACTITIONER

## 2022-06-09 NOTE — MR AVS SNAPSHOT
[address] [facility] 
 
 
 [address], [place] [address] [address] [facility] 
[phone] Patient: Florentino Conroy MRN:  [id number] Visit Information Date & Time Provider Department Dept. Phone Encounter #  
 6/19/2018 10:10 AM Taylor Cloud, [facility] [address] [phone] [id number] Your Appointments 10/12/2018  9:15 AM  
ROUTINE CARE with MD Melody Welsh 57 [address] (Ellen Salines) Appt Note: 6 month f/u $0cp [facility] [address], [address] [place] [address]  
[phone]  
  
   
 [address], [address] [address] Upcoming Health Maintenance Date Due  
 GLAUCOMA SCREENING Q2Y 5/27/2017 Influenza Age 5 to Adult 8/1/2018 MEDICARE YEARLY EXAM 3/31/2019 DTaP/Tdap/Td series (2 - Td) 10/22/2024 Allergies as of 6/19/2018  Review Complete On: 6/19/2018 By: Leonid Hi LPN No Known Allergies Current Immunizations  Reviewed on 3/23/2017 Name Date Influenza High Dose Vaccine PF 9/29/2017, 9/22/2016, 10/20/2015, 10/22/2014 Influenza Vaccine 12/6/2013 Pneumococcal Conjugate (PCV-13) 3/23/2017 Pneumococcal Polysaccharide (PPSV-23) 12/6/2013 Tdap 10/22/2014 Not reviewed this visit You Were Diagnosed With   
  
 Codes Comments Atrial fibrillation, unspecified type ([facility]    -  Primary ICD-10-CM: I48.91 
ICD-9-CM: 427.31 Vitals BP Pulse Temp Resp Height(growth percentile) Weight(growth percentile) 125/56 (BP 1 Location: Left arm, BP Patient Position: Sitting) 83 98.1 °F (36.7 °C) (Oral) 20 5' (1.524 m) 149 lb (67.6 kg) SpO2 BMI OB Status Smoking Status 95% 29.1 kg/m2 Postmenopausal Never Smoker Vitals History BMI and BSA Data Body Mass Index Body Surface Area  
 29.1 kg/m 2 1.69 m 2 Preferred Pharmacy Pharmacy Name Phone Continue home regimen: Cardizem CD 240mg daily  Monitor BP with routine VS   Follow-up with PCP as outpatient  [address] [phone] Your Updated Medication List  
  
   
This list is accurate as of 6/19/18 10:27 AM.  Always use your most recent med list. amLODIPine 5 mg tablet Commonly known as:  Dari Chimes Take 1 Tab by mouth daily. COUMADIN 5 mg tablet Generic drug:  warfarin Take 5 mg by mouth daily. Patient is currently taking coumadin taking 5mg Mon-Fri, 7. 5 mg Sat and Sun. Indications: S, S   7.5mg.  
  
 doxazosin 8 mg tablet Commonly known as:  CARDURA Take 0.5 tablet twice daily. flecainide 100 mg tablet Commonly known as:  TAMBOCOR Take 1 Tab by mouth three (3) times daily. hydroCHLOROthiazide 25 mg tablet Commonly known as:  HYDRODIURIL  
TAKE 1 TABLET BY MOUTH DAILY. levothyroxine 50 mcg tablet Commonly known as:  SYNTHROID  
TAKE 1 TABLET BY MOUTH EVERY MORNING BEFORE BREAKFAST FOR THYROID Description Per Crystal Lundborg, NP. Continue current dosage of coumadin. Return in 1 month for re-check. June 2018 Details Sun Mon Tue Wed Thu Fri Sat  
       1  
  
  
  
   2  
  
  
  
  
  3  
  
  
  
   4  
  
  
  
   5  
  
  
  
   6  
  
  
  
   7  
  
  
  
   8  
  
  
  
   9  
  
  
  
  
  10  
  
  
  
   11  
  
  
  
   12  
  
  
  
   13  
  
  
  
   14  
  
  
  
   15  
  
  
  
   16  
  
  
  
  
  17  
  
  
  
   18  
  
  
  
   19  
  
5 mg See details 20  
  
7.5 mg  
  
   21  
  
5 mg  
  
   22  
  
7.5 mg  
  
   23  
  
5 mg  
  
  
  24  
  
5 mg  
  
   25  
  
7.5 mg  
  
   26  
  
5 mg  
  
   27  
  
7.5 mg  
  
   28  
  
5 mg  
  
   29  
  
7.5 mg  
  
   30  
  
5 mg Date Details 06/19 This INR check INR: 2.0 How to take your warfarin dose To take:  5 mg Take one of the 5 mg tablets. To take:  7.5 mg Take one and a half of the 5 mg tablets. July 2018 Details Edwin Everett Tue Wed Thu Fri Sat  
  1  
  
5 mg  
  
   2  
  
7.5 mg  
  
   3  
  
5 mg  
  
   4  
  
7.5 mg  
  
   5  
  
5 mg  
  
   6  
  
7.5 mg  
  
   7  
  
5 mg  
  
  
  8  
  
5 mg  
  
   9  
  
7.5 mg  
  
   10  
  
5 mg  
  
   11  
  
7.5 mg  
  
   12  
  
5 mg  
  
   13  
  
7.5 mg  
  
   14  
  
5 mg  
  
  
  15  
  
5 mg  
  
   16  
  
7.5 mg  
  
   17  
  
5 mg  
  
   18  
  
7.5 mg  
  
   19  
  
5 mg 20  
  
  
  
   21  
  
  
  
  
  22  
  
  
  
   23  
  
  
  
   24  
  
  
  
   25  
  
  
  
   26  
  
  
  
   27  
  
  
  
   28  
  
  
  
  
  29  
  
  
  
   30  
  
  
  
   31  
  
  
  
      
 Date Details No additional details Date of next INR:  7/19/2018 How to take your warfarin dose To take:  5 mg Take one of the 5 mg tablets. To take:  7.5 mg Take one and a half of the 5 mg tablets. We Performed the Following AMB POC PT/INR [19578 CPT(R)] Introducing [facility]! [facility] introduces [employer] patient portal. Now you can access parts of your medical record, email your doctor's office, and request medication refills online. 1. In your internet browser, go to [email or web address] 2. Click on the First Time User? Click Here link in the Sign In box. You will see the New Member Sign Up page. 3. Enter your [employer] Access Code exactly as it appears below. You will not need to use this code after youve completed the sign-up process. If you do not sign up before the expiration date, you must request a new code. · [employer] Access Code: [email or web address] Expires: 8/20/2018 10:16 AM 
 
4. Enter the last four digits of your Social Security Number (xxxx) and Date of Birth (mm/dd/yyyy) as indicated and click Submit. You will be taken to the next sign-up page. 5. Create a [employer] ID. This will be your [employer] login ID and cannot be changed, so think of one that is secure and easy to remember. 6. Create a SnipSnap password. You can change your password at any time. 7. Enter your Password Reset Question and Answer. This can be used at a later time if you forget your password. 8. Enter your e-mail address. You will receive e-mail notification when new information is available in [address] 9. Click Sign Up. You can now view and download portions of your medical record. 10. Click the Download Summary menu link to download a portable copy of your medical information. If you have questions, please visit the Frequently Asked Questions section of the [employer]ipSnap website. Remember, [employer]ip[employer]ap is NOT to be used for urgent needs. For medical emergencies, dial 911. Now available from your iPhone and Android! Please provide this summary of care documentation to your next provider. Your primary care clinician is listed as Ghada Suárez. If you have any questions after today's visit, please call [phone].

## 2022-07-21 ENCOUNTER — CLINICAL SUPPORT (OUTPATIENT)
Dept: FAMILY MEDICINE CLINIC | Age: 87
End: 2022-07-21
Payer: MEDICARE

## 2022-07-21 DIAGNOSIS — Z79.01 ANTICOAGULATION MONITORING, INR RANGE 2-3: Primary | ICD-10-CM

## 2022-07-21 LAB
INR BLD: 2.2 (ref 1–1.5)
PT POC: 26.8 SECONDS (ref 9.1–12)
VALID INTERNAL CONTROL?: YES

## 2022-07-21 PROCEDURE — 85610 PROTHROMBIN TIME: CPT | Performed by: FAMILY MEDICINE

## 2022-08-25 ENCOUNTER — CLINICAL SUPPORT (OUTPATIENT)
Dept: FAMILY MEDICINE CLINIC | Age: 87
End: 2022-08-25
Payer: MEDICARE

## 2022-08-25 DIAGNOSIS — I48.0 PAROXYSMAL ATRIAL FIBRILLATION (HCC): Primary | ICD-10-CM

## 2022-08-25 LAB
INR BLD: 1.8
PT POC: 21.6 SECONDS
VALID INTERNAL CONTROL?: YES

## 2022-08-25 PROCEDURE — 85610 PROTHROMBIN TIME: CPT | Performed by: NURSE PRACTITIONER

## 2022-08-25 NOTE — PROGRESS NOTES
Veda Oscar is a 80 y.o. female who presents today for Anticoagulation monitoring. Indication: Atrial Fibrillation  INR Goal: 2.0-3.0. Current dose:  Coumadin 5 mg daily. Missed Coumadin Doses:  None  Medication Changes:  no  Dietary Changes:  no    Symptoms: taking coumadin appropriately without any bleeding. Latest INRs:  Lab Results   Component Value Date/Time    INR 2.2 (H) 06/22/2020 10:31 AM    INR 3.1 (H) 04/23/2014 11:30 AM    INR (POC) 1.4 (H) 02/16/2018 11:40 AM    INR, External 1.9 04/21/2015 12:48 PM    INR POC 2.2 (A) 07/21/2022 01:35 PM    INR POC 2.5 (A) 06/09/2022 10:37 AM    INR POC 2.2 (A) 05/12/2022 10:12 AM    Prothrombin time 22.8 (H) 06/22/2020 10:31 AM    Prothrombin time 31.6 (H) 04/23/2014 11:30 AM        New Coumadin dose:.current treatment plan is effective, no change in therapy. Next check to be scheduled for  2 weeks.

## 2022-08-26 ENCOUNTER — TELEPHONE (OUTPATIENT)
Dept: FAMILY MEDICINE CLINIC | Age: 87
End: 2022-08-26

## 2022-08-26 RX ORDER — WARFARIN 1 MG/1
1 TABLET ORAL DAILY
Qty: 30 TABLET | Refills: 0 | Status: SHIPPED | OUTPATIENT
Start: 2022-08-26

## 2022-08-26 NOTE — TELEPHONE ENCOUNTER
Pt notified per Dr. Renae Pagan of current dosing schedule, if she chooses. Understanding vocalized.

## 2022-08-26 NOTE — TELEPHONE ENCOUNTER
verified. Pt is concerned about yesterday INR 1.8.  she believes that her pills should be increased. She's not comfortable with taking one pill only.

## 2022-08-26 NOTE — TELEPHONE ENCOUNTER
Pt is calling to go over her Rxs    Pt stated that she didn't want to go into great detail with me over it.  She wants to speak to Dr Sadi Mcintosh about it all

## 2022-08-26 NOTE — TELEPHONE ENCOUNTER
She has been very consistent in therapeutic range on 5 mg daily. I would expect her to return to therapeutic range in 2 weeks with no dose adjustment. If she would feel more comfortable with a dose increase I called in warfarin 1 mg tabs.   Take 6 mg on Tuesday and Thursday and 5 mg other days

## 2022-12-17 RX ORDER — DOXAZOSIN 8 MG/1
TABLET ORAL
Qty: 45 TABLET | Refills: 0 | Status: SHIPPED | OUTPATIENT
Start: 2022-12-17

## 2023-04-24 DIAGNOSIS — J40 BRONCHITIS: ICD-10-CM

## 2023-04-24 RX ORDER — HYDROCHLOROTHIAZIDE 25 MG/1
TABLET ORAL
Qty: 30 TABLET | Refills: 0 | OUTPATIENT
Start: 2023-04-24

## 2023-07-10 ENCOUNTER — OFFICE VISIT (OUTPATIENT)
Age: 88
End: 2023-07-10
Payer: MEDICARE

## 2023-07-10 VITALS
RESPIRATION RATE: 16 BRPM | WEIGHT: 151 LBS | OXYGEN SATURATION: 99 % | SYSTOLIC BLOOD PRESSURE: 176 MMHG | DIASTOLIC BLOOD PRESSURE: 63 MMHG | HEART RATE: 74 BPM | BODY MASS INDEX: 28.51 KG/M2 | HEIGHT: 61 IN

## 2023-07-10 DIAGNOSIS — Z79.01 LONG TERM (CURRENT) USE OF ANTICOAGULANTS: Primary | ICD-10-CM

## 2023-07-10 DIAGNOSIS — E03.9 ACQUIRED HYPOTHYROIDISM: ICD-10-CM

## 2023-07-10 DIAGNOSIS — I48.11 LONGSTANDING PERSISTENT ATRIAL FIBRILLATION (HCC): ICD-10-CM

## 2023-07-10 DIAGNOSIS — I27.20 PULMONARY HYPERTENSION, UNSPECIFIED (HCC): ICD-10-CM

## 2023-07-10 DIAGNOSIS — Z00.00 MEDICARE ANNUAL WELLNESS VISIT, SUBSEQUENT: ICD-10-CM

## 2023-07-10 DIAGNOSIS — I27.20 PULMONARY HYPERTENSION (HCC): ICD-10-CM

## 2023-07-10 DIAGNOSIS — R79.9 ABNORMAL FINDING OF BLOOD CHEMISTRY, UNSPECIFIED: ICD-10-CM

## 2023-07-10 LAB
POC INR: 2.2
PROTHROMBIN TIME, POC: 26.4

## 2023-07-10 PROCEDURE — PBSHW AMB POC PT/INR: Performed by: FAMILY MEDICINE

## 2023-07-10 PROCEDURE — G0439 PPPS, SUBSEQ VISIT: HCPCS | Performed by: FAMILY MEDICINE

## 2023-07-10 PROCEDURE — 1036F TOBACCO NON-USER: CPT | Performed by: FAMILY MEDICINE

## 2023-07-10 PROCEDURE — 99213 OFFICE O/P EST LOW 20 MIN: CPT | Performed by: FAMILY MEDICINE

## 2023-07-10 PROCEDURE — 1090F PRES/ABSN URINE INCON ASSESS: CPT | Performed by: FAMILY MEDICINE

## 2023-07-10 PROCEDURE — 1123F ACP DISCUSS/DSCN MKR DOCD: CPT | Performed by: FAMILY MEDICINE

## 2023-07-10 PROCEDURE — 85610 PROTHROMBIN TIME: CPT | Performed by: FAMILY MEDICINE

## 2023-07-10 PROCEDURE — G8419 CALC BMI OUT NRM PARAM NOF/U: HCPCS | Performed by: FAMILY MEDICINE

## 2023-07-10 PROCEDURE — G8427 DOCREV CUR MEDS BY ELIG CLIN: HCPCS | Performed by: FAMILY MEDICINE

## 2023-07-10 RX ORDER — FLUCONAZOLE 150 MG/1
150 TABLET ORAL ONCE
Qty: 1 TABLET | Refills: 0 | Status: SHIPPED | OUTPATIENT
Start: 2023-07-10 | End: 2023-07-10

## 2023-07-10 RX ORDER — LOSARTAN POTASSIUM 25 MG/1
0.5 TABLET ORAL DAILY
COMMUNITY
Start: 2023-05-12

## 2023-07-10 SDOH — ECONOMIC STABILITY: FOOD INSECURITY: WITHIN THE PAST 12 MONTHS, THE FOOD YOU BOUGHT JUST DIDN'T LAST AND YOU DIDN'T HAVE MONEY TO GET MORE.: NEVER TRUE

## 2023-07-10 SDOH — ECONOMIC STABILITY: FOOD INSECURITY: WITHIN THE PAST 12 MONTHS, YOU WORRIED THAT YOUR FOOD WOULD RUN OUT BEFORE YOU GOT MONEY TO BUY MORE.: NEVER TRUE

## 2023-07-10 SDOH — ECONOMIC STABILITY: HOUSING INSECURITY
IN THE LAST 12 MONTHS, WAS THERE A TIME WHEN YOU DID NOT HAVE A STEADY PLACE TO SLEEP OR SLEPT IN A SHELTER (INCLUDING NOW)?: NO

## 2023-07-10 SDOH — ECONOMIC STABILITY: INCOME INSECURITY: HOW HARD IS IT FOR YOU TO PAY FOR THE VERY BASICS LIKE FOOD, HOUSING, MEDICAL CARE, AND HEATING?: NOT HARD AT ALL

## 2023-07-10 ASSESSMENT — PATIENT HEALTH QUESTIONNAIRE - PHQ9
SUM OF ALL RESPONSES TO PHQ QUESTIONS 1-9: 0
SUM OF ALL RESPONSES TO PHQ QUESTIONS 1-9: 0
SUM OF ALL RESPONSES TO PHQ9 QUESTIONS 1 & 2: 0
1. LITTLE INTEREST OR PLEASURE IN DOING THINGS: 0
2. FEELING DOWN, DEPRESSED OR HOPELESS: 0
SUM OF ALL RESPONSES TO PHQ QUESTIONS 1-9: 0
SUM OF ALL RESPONSES TO PHQ QUESTIONS 1-9: 0

## 2023-07-10 ASSESSMENT — LIFESTYLE VARIABLES
HOW OFTEN DO YOU HAVE A DRINK CONTAINING ALCOHOL: NEVER
HOW MANY STANDARD DRINKS CONTAINING ALCOHOL DO YOU HAVE ON A TYPICAL DAY: PATIENT DOES NOT DRINK

## 2023-07-10 NOTE — PROGRESS NOTES
Chief Complaint   Patient presents with    Medicare AWV    Coagulation Disorder     Mon Thursday 7.5mg and 5mg all other days   Pt is having an allergic reaction to something, maybe bug spray, rash on face/arms, and around waist, has been putting otc cream o nit     Health Maintenance Due   Topic Date Due    COVID-19 Vaccine (1) Never done    Shingles vaccine (1 of 2) Never done    Annual Wellness Visit (AWV)  11/19/2022    Depression Screen  03/07/2023           1. \"Have you been to the ER, urgent care clinic since your last visit? Hospitalized since your last visit? \" No    2. \"Have you seen or consulted any other health care providers outside of the [address] [place] [address] since your last visit? \"  Yes, cardiologist       3. For patients aged 43-73: Has the patient had a colonoscopy / FIT/ Cologuard? NA - based on age      If the patient is female:    4. For patients aged 43-66: Has the patient had a mammogram within the past 2 years? NA - based on age or sex      11. For patients aged 21-65: Has the patient had a pap smear?  NA - based on age or sex

## 2023-07-11 LAB
ALBUMIN SERPL-MCNC: 4.2 G/DL (ref 3.5–5)
ALBUMIN/GLOB SERPL: 1.4 (ref 1.1–2.2)
ALP SERPL-CCNC: 85 U/L (ref 45–117)
ALT SERPL-CCNC: 18 U/L (ref 12–78)
ANION GAP SERPL CALC-SCNC: 6 MMOL/L (ref 5–15)
AST SERPL-CCNC: 23 U/L (ref 15–37)
BASOPHILS # BLD: 0 K/UL (ref 0–0.1)
BASOPHILS NFR BLD: 0 % (ref 0–1)
BILIRUB SERPL-MCNC: 0.7 MG/DL (ref 0.2–1)
BUN SERPL-MCNC: 25 MG/DL (ref 6–20)
BUN/CREAT SERPL: 25 (ref 12–20)
CALCIUM SERPL-MCNC: 10 MG/DL (ref 8.5–10.1)
CHLORIDE SERPL-SCNC: 105 MMOL/L (ref 97–108)
CHOLEST SERPL-MCNC: 185 MG/DL
CO2 SERPL-SCNC: 27 MMOL/L (ref 21–32)
COMMENT:: NORMAL
CREAT SERPL-MCNC: 1 MG/DL (ref 0.55–1.02)
DIFFERENTIAL METHOD BLD: NORMAL
EOSINOPHIL # BLD: 0.2 K/UL (ref 0–0.4)
EOSINOPHIL NFR BLD: 3 % (ref 0–7)
ERYTHROCYTE [DISTWIDTH] IN BLOOD BY AUTOMATED COUNT: 13.2 % (ref 11.5–14.5)
EST. AVERAGE GLUCOSE BLD GHB EST-MCNC: 105 MG/DL
GLOBULIN SER CALC-MCNC: 3.1 G/DL (ref 2–4)
GLUCOSE SERPL-MCNC: 103 MG/DL (ref 65–100)
HBA1C MFR BLD: 5.3 % (ref 4–5.6)
HCT VFR BLD AUTO: 42 % (ref 35–47)
HDLC SERPL-MCNC: 75 MG/DL
HDLC SERPL: 2.5 (ref 0–5)
HGB BLD-MCNC: 13 G/DL (ref 11.5–16)
IMM GRANULOCYTES # BLD AUTO: 0 K/UL (ref 0–0.04)
IMM GRANULOCYTES NFR BLD AUTO: 0 % (ref 0–0.5)
LDLC SERPL CALC-MCNC: 97.6 MG/DL (ref 0–100)
LYMPHOCYTES # BLD: 1.2 K/UL (ref 0.8–3.5)
LYMPHOCYTES NFR BLD: 18 % (ref 12–49)
MCH RBC QN AUTO: 30.2 PG (ref 26–34)
MCHC RBC AUTO-ENTMCNC: 31 G/DL (ref 30–36.5)
MCV RBC AUTO: 97.4 FL (ref 80–99)
MONOCYTES # BLD: 0.6 K/UL (ref 0–1)
MONOCYTES NFR BLD: 9 % (ref 5–13)
NEUTS SEG # BLD: 4.7 K/UL (ref 1.8–8)
NEUTS SEG NFR BLD: 70 % (ref 32–75)
NRBC # BLD: 0 K/UL (ref 0–0.01)
NRBC BLD-RTO: 0 PER 100 WBC
PLATELET # BLD AUTO: 164 K/UL (ref 150–400)
PMV BLD AUTO: 12.4 FL (ref 8.9–12.9)
POTASSIUM SERPL-SCNC: 4.4 MMOL/L (ref 3.5–5.1)
PROT SERPL-MCNC: 7.3 G/DL (ref 6.4–8.2)
RBC # BLD AUTO: 4.31 M/UL (ref 3.8–5.2)
SODIUM SERPL-SCNC: 138 MMOL/L (ref 136–145)
SPECIMEN HOLD: NORMAL
TRIGL SERPL-MCNC: 62 MG/DL
TSH SERPL DL<=0.05 MIU/L-ACNC: 2.22 UIU/ML (ref 0.36–3.74)
VLDLC SERPL CALC-MCNC: 12.4 MG/DL
WBC # BLD AUTO: 6.7 K/UL (ref 3.6–11)

## 2023-08-07 ENCOUNTER — NURSE ONLY (OUTPATIENT)
Age: 88
End: 2023-08-07
Payer: MEDICARE

## 2023-08-07 DIAGNOSIS — I48.11 LONGSTANDING PERSISTENT ATRIAL FIBRILLATION (HCC): Primary | ICD-10-CM

## 2023-08-07 LAB
POC INR: 2.9
PROTHROMBIN TIME, POC: 34.7

## 2023-08-07 PROCEDURE — PBSHW AMB POC PT/INR: Performed by: FAMILY MEDICINE

## 2023-08-07 PROCEDURE — 85610 PROTHROMBIN TIME: CPT | Performed by: FAMILY MEDICINE

## 2023-08-07 NOTE — PROGRESS NOTES
Qi Pollock is a 80 y.o. female who presents today for Anticoagulation monitoring. Indication: atrial fibrillation  INR Goal: 2.0-3.0. Current dose:  Coumadin 5 mg daily and 7.5 mg Mon, Thur. Missed Coumadin Doses:  None  Medication Changes:  no  Dietary Changes:  no    Symptoms: taking coumadin appropriately without any bleeding. Latest INRs:  Lab Results   Component Value Date/Time    INR 2.2 07/10/2023 09:54 AM    INR 1.8 08/25/2022 09:59 AM    INR 2.2 07/21/2022 01:35 PM    INR 2.5 06/09/2022 10:37 AM        New Coumadin dose:.current treatment plan is effective, no change in therapy. Next check to be scheduled for  4 weeks.

## 2023-09-06 RX ORDER — LOSARTAN POTASSIUM 25 MG/1
12.5 TABLET ORAL DAILY
Qty: 90 TABLET | Refills: 1 | Status: SHIPPED | OUTPATIENT
Start: 2023-09-06

## 2023-09-12 ENCOUNTER — NURSE ONLY (OUTPATIENT)
Age: 88
End: 2023-09-12
Payer: MEDICARE

## 2023-09-12 DIAGNOSIS — I48.11 LONGSTANDING PERSISTENT ATRIAL FIBRILLATION (HCC): Primary | ICD-10-CM

## 2023-09-12 LAB
POC INR: 2.4
PROTHROMBIN TIME, POC: 29.1

## 2023-09-12 PROCEDURE — PBSHW AMB POC PT/INR: Performed by: FAMILY MEDICINE

## 2023-09-12 PROCEDURE — 85610 PROTHROMBIN TIME: CPT | Performed by: FAMILY MEDICINE

## 2023-09-12 NOTE — PROGRESS NOTES
Iman Del Real is a 80 y.o. female who presents today for Anticoagulation monitoring. Indication: atrial fibrillation  INR Goal: 2.0-3.0. Current dose:  Coumadin 7.5 mg Mon, Thur. And 5 mg all other days   Missed Coumadin Doses:  None  Medication Changes:  no  Dietary Changes:  no    Symptoms: taking coumadin appropriately without any bleeding. Latest INRs:  Lab Results   Component Value Date/Time    INR 2.9 08/07/2023 10:12 AM    INR 2.2 07/10/2023 09:54 AM    INR 1.8 08/25/2022 09:59 AM    INR 2.2 07/21/2022 01:35 PM    INR 2.5 06/09/2022 10:37 AM        New Coumadin dose:.current treatment plan is effective, no change in therapy. Next check to be scheduled for  4 weeks.

## 2023-10-10 ENCOUNTER — NURSE ONLY (OUTPATIENT)
Age: 88
End: 2023-10-10
Payer: MEDICARE

## 2023-10-10 DIAGNOSIS — Z79.01 LONG TERM (CURRENT) USE OF ANTICOAGULANTS: Primary | ICD-10-CM

## 2023-10-10 LAB
POC INR: 2.8
PROTHROMBIN TIME, POC: 33.4

## 2023-10-10 PROCEDURE — PBSHW AMB POC PT/INR: Performed by: FAMILY MEDICINE

## 2023-10-10 PROCEDURE — 85610 PROTHROMBIN TIME: CPT | Performed by: FAMILY MEDICINE

## 2023-10-10 NOTE — PROGRESS NOTES
Andriy Velez is a 80 y.o. female who presents today for Anticoagulation monitoring. Indication: atrial fibrillation  INR Goal: 2.0-3.0. Current dose:  Coumadin 7.5mg mon & thurs 5mg other days. Missed Coumadin Doses:  None  Medication Changes:  no  Dietary Changes:  no    Symptoms: taking coumadin appropriately without any bleeding. Latest INRs:  Lab Results   Component Value Date/Time    INR 2.4 09/12/2023 11:16 AM    INR 2.9 08/07/2023 10:12 AM    INR 2.2 07/10/2023 09:54 AM    INR 1.8 08/25/2022 09:59 AM    INR 2.2 07/21/2022 01:35 PM    INR 2.5 06/09/2022 10:37 AM        New Coumadin dose:.current treatment plan is effective, no change in therapy. Next check to be scheduled for  4 weeks. February 26, 2019      Mayco Shultz MD  [address] [facility] [address] [place] [address]           [facility]  [address] [facility] [address] [place] [address]  Phone: [phone]  Fax: [phone]          Patient: Lyudmila Neri   MR Number: [id number]   YOB: 1948   Date of Visit: 2/26/2019       Dear Dr. Mayco Shultz:    Thank you for referring Lyudmila Neri to me for evaluation. Attached you will find relevant portions of my assessment and plan of care.    If you have questions, please do not hesitate to call me. I look forward to following Lyudmila Neri along with you.    Sincerely,    Siva Mcmanus MD    Enclosure  CC:  No Recipients    If you would like to receive this communication electronically, please contact [email or web address] or [phone] to request more information on [employer] access.    For providers and/or their staff who would like to refer a patient to [facility], please contact us through our one-stop-shop provider referral line, [facility], at [phone].    If you feel you have received this communication in error or would no longer like to receive these types of communications, please e-mail [email or web address]

## 2023-11-07 ENCOUNTER — NURSE ONLY (OUTPATIENT)
Age: 88
End: 2023-11-07
Payer: MEDICARE

## 2023-11-07 DIAGNOSIS — I48.11 LONGSTANDING PERSISTENT ATRIAL FIBRILLATION (HCC): Primary | ICD-10-CM

## 2023-11-07 LAB
POC INR: 3.1
PROTHROMBIN TIME, POC: 37.5

## 2023-11-07 PROCEDURE — 85610 PROTHROMBIN TIME: CPT | Performed by: FAMILY MEDICINE

## 2023-11-07 PROCEDURE — PBSHW AMB POC PT/INR: Performed by: FAMILY MEDICINE

## 2023-11-07 NOTE — PROGRESS NOTES
Enrique Leon who presents today for Anticoagulation monitoring. Indication: Atrial Fibrillation  INR Goal: 2.0-3.0. Today's INR:3.1  Previous INR:2.8  Current dose: 7.5 mg every Mon, and Thursday; 5 mg all other days   Missed Coumadin Doses:  None  Medication Changes:  no  Dietary Changes:  no     Symptoms: taking coumadin appropriately without any bleeding. New Coumadin dose:.current treatment plan is effective, no change in therapy. Next check to be scheduled for 2 weeks.

## 2023-11-22 ENCOUNTER — NURSE ONLY (OUTPATIENT)
Age: 88
End: 2023-11-22
Payer: MEDICARE

## 2023-11-22 DIAGNOSIS — I48.11 LONGSTANDING PERSISTENT ATRIAL FIBRILLATION (HCC): Primary | ICD-10-CM

## 2023-11-22 LAB
POC INR: 3.6
PROTHROMBIN TIME, POC: 43.8

## 2023-11-22 PROCEDURE — 85610 PROTHROMBIN TIME: CPT | Performed by: FAMILY MEDICINE

## 2023-11-22 PROCEDURE — PBSHW AMB POC PT/INR: Performed by: FAMILY MEDICINE

## 2023-11-22 NOTE — PROGRESS NOTES
Sangeeta Smith is a 80 y.o. female who presents today for Anticoagulation monitoring. Indication: atrial fibrillation  INR Goal: 2.0-3.0. Current dose:  Coumadin 5 mg daily, except 7.5 Mon/Thurs. Missed Coumadin Doses:  None  Medication Changes:  no  Dietary Changes:  no    Symptoms: taking coumadin appropriately without any bleeding. Latest INRs:  Lab Results   Component Value Date/Time    INR 3.1 11/07/2023 09:54 AM    INR 2.8 10/10/2023 10:10 AM    INR 2.4 09/12/2023 11:16 AM    INR 1.8 08/25/2022 09:59 AM    INR 2.2 07/21/2022 01:35 PM    INR 2.5 06/09/2022 10:37 AM        New Coumadin dose:.the following changes are made - see anti-coag calendar. Next check to be scheduled for  2 weeks.

## 2023-12-06 ENCOUNTER — NURSE ONLY (OUTPATIENT)
Age: 88
End: 2023-12-06
Payer: MEDICARE

## 2023-12-06 DIAGNOSIS — Z23 NEEDS FLU SHOT: ICD-10-CM

## 2023-12-06 DIAGNOSIS — I48.11 LONGSTANDING PERSISTENT ATRIAL FIBRILLATION (HCC): Primary | ICD-10-CM

## 2023-12-06 LAB
POC INR: 2.7
PROTHROMBIN TIME, POC: 32.2

## 2023-12-06 PROCEDURE — PBSHW AMB POC PT/INR: Performed by: FAMILY MEDICINE

## 2023-12-06 PROCEDURE — PBSHW INFLUENZA, FLUAD, (AGE 65 Y+), IM, PF, 0.5 ML: Performed by: FAMILY MEDICINE

## 2023-12-06 PROCEDURE — 85610 PROTHROMBIN TIME: CPT | Performed by: FAMILY MEDICINE

## 2023-12-06 PROCEDURE — 90694 VACC AIIV4 NO PRSRV 0.5ML IM: CPT

## 2023-12-06 NOTE — PROGRESS NOTES
Faustino Weaver is a 80 y.o. female who presents today for Anticoagulation monitoring. Indication: atrial fibrillation  INR Goal: 2.0-3.0. Current dose:  Coumadin 5 mg daily. Missed Coumadin Doses:  held dose 11/22, 11/23, 11/24   Medication Changes:  no  Dietary Changes:  no    Symptoms: taking coumadin appropriately without any bleeding. Latest INRs:  Lab Results   Component Value Date/Time    INR 3.6 11/22/2023 10:02 AM    INR 3.1 11/07/2023 09:54 AM    INR 2.8 10/10/2023 10:10 AM    INR 1.8 08/25/2022 09:59 AM    INR 2.2 07/21/2022 01:35 PM    INR 2.5 06/09/2022 10:37 AM        New Coumadin dose:.current treatment plan is effective, no change in therapy. Next check to be scheduled for  4 weeks.

## 2023-12-26 ENCOUNTER — TELEPHONE (OUTPATIENT)
Age: 88
End: 2023-12-26

## 2023-12-26 NOTE — TELEPHONE ENCOUNTER
Pt's daughter called the office and said pt tested positive for Covid yesterday. pt has cough body aches. Daughter would like Paxlovid called in to pharmacy for pt.

## 2023-12-27 ENCOUNTER — TELEPHONE (OUTPATIENT)
Age: 88
End: 2023-12-27

## 2023-12-27 NOTE — TELEPHONE ENCOUNTER
Pt daughter, Marli Lundberg is calling to discuss her rxs.  Pt was prescribed Paxlovid on 12/26    Pharmacy informed that taking Paxlovid with her dilTIAZem (TIAZAC) 180 MG extended release capsule and warfarin (COUMADIN) 1 MG tablet is not recommended    Please call to discuss rxs

## 2023-12-28 NOTE — TELEPHONE ENCOUNTER
Pt's daughter (Ms. Parmjit Dong) is calling; Pt's daughter states that the pharmacy called her and states that it was important for her to reach back out to the pt's PCP's office; I advised the daughter that her message was sent and that her provider (Mark Thrasher) will get back will be in touch; Pt's daughter verbally agreed and understood     Thank You

## 2023-12-28 NOTE — TELEPHONE ENCOUNTER
Please advise that this medication is safe to take with both of the listed medications, however it will likely affect her coumadin level which should be checked at the completion of the medication course

## 2023-12-28 NOTE — TELEPHONE ENCOUNTER
verified with pts daughter. Informed daughter of message from provider regarding medication and INR. Daughter verified understanding and scheduled an INR nurse visit for 1/3/24. Daughter verified understanding and had no further questions.

## 2024-01-03 ENCOUNTER — NURSE ONLY (OUTPATIENT)
Age: 89
End: 2024-01-03
Payer: MEDICARE

## 2024-01-03 DIAGNOSIS — I48.11 LONGSTANDING PERSISTENT ATRIAL FIBRILLATION (HCC): Primary | ICD-10-CM

## 2024-01-03 LAB
POC INR: 1.8
PROTHROMBIN TIME, POC: 21.8

## 2024-01-03 PROCEDURE — 85610 PROTHROMBIN TIME: CPT | Performed by: FAMILY MEDICINE

## 2024-01-03 PROCEDURE — PBSHW AMB POC PT/INR: Performed by: FAMILY MEDICINE

## 2024-01-03 NOTE — PROGRESS NOTES
Sobeida Fried  who presents today for Anticoagulation monitoring.    Indication: Atrial Fibrillation  INR Goal: 2.0-3.0.  Today's INR: 1.8  Previous INR: 2.9  Current dose: 5 mg daily   Missed Coumadin Doses:  None  Medication Changes:  no  Dietary Changes:  no     Symptoms: taking coumadin appropriately without any bleeding.       New Coumadin dose:.current treatment plan is effective, no change in therapy.     Next check to be scheduled for 2 weeks.

## 2024-01-17 ENCOUNTER — NURSE ONLY (OUTPATIENT)
Age: 89
End: 2024-01-17
Payer: MEDICARE

## 2024-01-17 DIAGNOSIS — I48.11 LONGSTANDING PERSISTENT ATRIAL FIBRILLATION (HCC): Primary | ICD-10-CM

## 2024-01-17 LAB
POC INR: 2.7
PROTHROMBIN TIME, POC: 32.5

## 2024-01-17 PROCEDURE — 85610 PROTHROMBIN TIME: CPT | Performed by: FAMILY MEDICINE

## 2024-01-17 PROCEDURE — PBSHW AMB POC PT/INR: Performed by: FAMILY MEDICINE

## 2024-01-17 NOTE — PROGRESS NOTES
Sobeida Fried is a 89 y.o. female who presents today for Anticoagulation monitoring.    Indication: atrial fibrillation  INR Goal: 2.0-3.0.  Current dose:  Coumadin 5 mg daily.  Missed Coumadin Doses:  None  Medication Changes:  no  Dietary Changes:  no    Symptoms: taking coumadin appropriately without any bleeding.    Latest INRs:  Lab Results   Component Value Date/Time    INR 1.8 01/03/2024 10:00 AM    INR 2.9 12/20/2023 10:06 AM    INR 2.7 12/06/2023 10:11 AM    INR 1.8 08/25/2022 09:59 AM    INR 2.2 07/21/2022 01:35 PM    INR 2.5 06/09/2022 10:37 AM        New Coumadin dose:.current treatment plan is effective, no change in therapy.    Next check to be scheduled for  2 weeks.

## 2024-01-31 ENCOUNTER — NURSE ONLY (OUTPATIENT)
Age: 89
End: 2024-01-31
Payer: MEDICARE

## 2024-01-31 DIAGNOSIS — R30.0 DYSURIA: ICD-10-CM

## 2024-01-31 DIAGNOSIS — R41.82 ALTERED MENTAL STATUS, UNSPECIFIED ALTERED MENTAL STATUS TYPE: Primary | ICD-10-CM

## 2024-01-31 LAB
BILIRUBIN, URINE, POC: NEGATIVE
BLOOD URINE, POC: NORMAL
GLUCOSE URINE, POC: NEGATIVE
KETONES, URINE, POC: NORMAL
LEUKOCYTE ESTERASE, URINE, POC: NEGATIVE
NITRITE, URINE, POC: NEGATIVE
PH, URINE, POC: 6.5 (ref 4.6–8)
PROTEIN,URINE, POC: NEGATIVE
SPECIFIC GRAVITY, URINE, POC: 1.02 (ref 1–1.03)
URINALYSIS CLARITY, POC: NORMAL
URINALYSIS COLOR, POC: YELLOW
UROBILINOGEN, POC: NORMAL

## 2024-01-31 PROCEDURE — PBSHW AMB POC URINALYSIS DIP STICK MANUAL W/O MICRO: Performed by: FAMILY MEDICINE

## 2024-01-31 PROCEDURE — 81002 URINALYSIS NONAUTO W/O SCOPE: CPT | Performed by: FAMILY MEDICINE

## 2024-02-02 ENCOUNTER — TELEPHONE (OUTPATIENT)
Age: 89
End: 2024-02-02

## 2024-02-02 LAB
BACTERIA SPEC CULT: NORMAL
CC UR VC: NORMAL
SERVICE CMNT-IMP: NORMAL

## 2024-02-03 ENCOUNTER — TELEPHONE (OUTPATIENT)
Facility: CLINIC | Age: 89
End: 2024-02-03

## 2024-02-03 RX ORDER — CEPHALEXIN 500 MG/1
500 CAPSULE ORAL 2 TIMES DAILY
Qty: 20 CAPSULE | Refills: 0 | Status: SHIPPED | OUTPATIENT
Start: 2024-02-03 | End: 2024-02-13

## 2024-02-03 NOTE — TELEPHONE ENCOUNTER
Emergency call received that abx for UTI no yet available at pharmacy. Keflex prescription resent to [facility] pharmacy. Called pharmacy to verify receipt.

## 2024-02-14 ENCOUNTER — OFFICE VISIT (OUTPATIENT)
Age: 89
End: 2024-02-14
Payer: MEDICARE

## 2024-02-14 VITALS
OXYGEN SATURATION: 97 % | RESPIRATION RATE: 16 BRPM | HEART RATE: 70 BPM | SYSTOLIC BLOOD PRESSURE: 156 MMHG | HEIGHT: 61 IN | WEIGHT: 156.6 LBS | BODY MASS INDEX: 29.57 KG/M2 | DIASTOLIC BLOOD PRESSURE: 61 MMHG

## 2024-02-14 DIAGNOSIS — I48.11 LONGSTANDING PERSISTENT ATRIAL FIBRILLATION (HCC): Primary | ICD-10-CM

## 2024-02-14 DIAGNOSIS — I10 PRIMARY HYPERTENSION: ICD-10-CM

## 2024-02-14 DIAGNOSIS — N39.0 RECURRENT UTI: ICD-10-CM

## 2024-02-14 DIAGNOSIS — E03.9 ACQUIRED HYPOTHYROIDISM: ICD-10-CM

## 2024-02-14 LAB
BILIRUBIN, URINE, POC: NEGATIVE
BLOOD URINE, POC: NEGATIVE
GLUCOSE URINE, POC: NEGATIVE
KETONES, URINE, POC: NEGATIVE
LEUKOCYTE ESTERASE, URINE, POC: NEGATIVE
NITRITE, URINE, POC: NEGATIVE
PH, URINE, POC: 5.5 (ref 4.6–8)
POC INR: 2.8
PROTEIN,URINE, POC: NEGATIVE
PROTHROMBIN TIME, POC: 34.1
SPECIFIC GRAVITY, URINE, POC: 1.02 (ref 1–1.03)
URINALYSIS CLARITY, POC: CLEAR
URINALYSIS COLOR, POC: YELLOW
UROBILINOGEN, POC: NORMAL

## 2024-02-14 PROCEDURE — PBSHW AMB POC URINALYSIS DIP STICK AUTO W/ MICRO: Performed by: FAMILY MEDICINE

## 2024-02-14 PROCEDURE — 99214 OFFICE O/P EST MOD 30 MIN: CPT | Performed by: FAMILY MEDICINE

## 2024-02-14 PROCEDURE — 1123F ACP DISCUSS/DSCN MKR DOCD: CPT | Performed by: FAMILY MEDICINE

## 2024-02-14 PROCEDURE — G8427 DOCREV CUR MEDS BY ELIG CLIN: HCPCS | Performed by: FAMILY MEDICINE

## 2024-02-14 PROCEDURE — 81001 URINALYSIS AUTO W/SCOPE: CPT | Performed by: FAMILY MEDICINE

## 2024-02-14 PROCEDURE — 1036F TOBACCO NON-USER: CPT | Performed by: FAMILY MEDICINE

## 2024-02-14 PROCEDURE — PBSHW AMB POC PT/INR: Performed by: FAMILY MEDICINE

## 2024-02-14 PROCEDURE — 1090F PRES/ABSN URINE INCON ASSESS: CPT | Performed by: FAMILY MEDICINE

## 2024-02-14 PROCEDURE — G8484 FLU IMMUNIZE NO ADMIN: HCPCS | Performed by: FAMILY MEDICINE

## 2024-02-14 PROCEDURE — G8419 CALC BMI OUT NRM PARAM NOF/U: HCPCS | Performed by: FAMILY MEDICINE

## 2024-02-14 PROCEDURE — 85610 PROTHROMBIN TIME: CPT | Performed by: FAMILY MEDICINE

## 2024-02-14 NOTE — PROGRESS NOTES
Chief Complaint   Patient presents with    Follow-up    Coagulation Disorder     Pt reports that 2 weeks ago, she had an auditory hallucination in the evening when saying her prayers, heard a man singing amazing joseph. This happened a few nights in a row.     Only occurred when she was by herself. Stopped when she turned the television on.     Stopped the last 4 nights.     Pt was recently treated for a UTI.     Pt developed COVID around [place], Paxlovid was taken, was better after a week.     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   Lab Results   Component Value Date    INR 2.8 02/14/2024    INR 2.7 01/17/2024    INR 1.8 01/03/2024    PROTIME 22.8 (H) 06/22/2020     Subjective: (As above and below)     Chief Complaint   Patient presents with    Follow-up    Coagulation Disorder     she is a 89 y.o. year old female who presents for evaluation.    Reviewed PmHx, RxHx, FmHx, SocHx, AllgHx and updated in chart.    Review of Systems - negative except as listed above    Objective:     Vitals:    02/14/24 0909 02/14/24 0921   BP: (!) 193/61 (!) 156/61   Site: Left Upper Arm    Position: Sitting    Cuff Size: Medium Adult    Pulse: 70    Resp: 16    SpO2: 97%    Weight: 71 kg (156 lb 9.6 oz)    Height: 1.549 m (5' 1\")      Physical Examination: General appearance - alert, well appearing, and in no distress  Mental status - normal mood, behavior, speech, dress, motor activity, and thought processes  Ears - bilateral TM's and external ear canals normal  Chest - clear to auscultation, no wheezes, rales or rhonchi, symmetric air entry  Heart - irregularly irregular  Musculoskeletal - no joint tenderness, deformity or swelling  Extremities - peripheral pulses normal, no pedal edema, no clubbing or cyanosis    Assessment/ Plan:   1. Longstanding persistent atrial fibrillation (HCC)  -well controlled  - AMB POC PT/INR    2. Primary hypertension  -elevated in office  - CBC with Auto Differential; Future  - Comprehensive

## 2024-02-14 NOTE — PROGRESS NOTES
Chief Complaint   Patient presents with    Follow-up    Coagulation Disorder         Health Maintenance Due   Topic Date Due    COVID-19 Vaccine (1) Never done    Respiratory Syncytial Virus (RSV) Pregnant or age 60 yrs+ (1 - 1-dose 60+ series) Never done         \"Have you been to the ER, urgent care clinic since your last visit?  Hospitalized since your last visit?\"    NO    “Have you seen or consulted any other health care providers outside of [facility] since your last visit?”    NO

## 2024-02-15 LAB
ALBUMIN SERPL-MCNC: 4 G/DL (ref 3.5–5)
ALBUMIN/GLOB SERPL: 1.3 (ref 1.1–2.2)
ALP SERPL-CCNC: 82 U/L (ref 45–117)
ALT SERPL-CCNC: 18 U/L (ref 12–78)
ANION GAP SERPL CALC-SCNC: 4 MMOL/L (ref 5–15)
AST SERPL-CCNC: 18 U/L (ref 15–37)
BACTERIA SPEC CULT: NORMAL
BASOPHILS # BLD: 0 K/UL (ref 0–0.1)
BASOPHILS NFR BLD: 0 % (ref 0–1)
BILIRUB SERPL-MCNC: 0.5 MG/DL (ref 0.2–1)
BUN SERPL-MCNC: 26 MG/DL (ref 6–20)
BUN/CREAT SERPL: 27 (ref 12–20)
CALCIUM SERPL-MCNC: 10.3 MG/DL (ref 8.5–10.1)
CHLORIDE SERPL-SCNC: 106 MMOL/L (ref 97–108)
CHOLEST SERPL-MCNC: 183 MG/DL
CO2 SERPL-SCNC: 28 MMOL/L (ref 21–32)
CREAT SERPL-MCNC: 0.96 MG/DL (ref 0.55–1.02)
DIFFERENTIAL METHOD BLD: NORMAL
EOSINOPHIL # BLD: 0.1 K/UL (ref 0–0.4)
EOSINOPHIL NFR BLD: 1 % (ref 0–7)
ERYTHROCYTE [DISTWIDTH] IN BLOOD BY AUTOMATED COUNT: 13.9 % (ref 11.5–14.5)
GLOBULIN SER CALC-MCNC: 3.2 G/DL (ref 2–4)
GLUCOSE SERPL-MCNC: 97 MG/DL (ref 65–100)
HCT VFR BLD AUTO: 37.9 % (ref 35–47)
HDLC SERPL-MCNC: 77 MG/DL
HDLC SERPL: 2.4 (ref 0–5)
HGB BLD-MCNC: 12.4 G/DL (ref 11.5–16)
IMM GRANULOCYTES # BLD AUTO: 0 K/UL (ref 0–0.04)
IMM GRANULOCYTES NFR BLD AUTO: 0 % (ref 0–0.5)
LDLC SERPL CALC-MCNC: 96 MG/DL (ref 0–100)
LYMPHOCYTES # BLD: 1.6 K/UL (ref 0.8–3.5)
LYMPHOCYTES NFR BLD: 26 % (ref 12–49)
MCH RBC QN AUTO: 31.2 PG (ref 26–34)
MCHC RBC AUTO-ENTMCNC: 32.7 G/DL (ref 30–36.5)
MCV RBC AUTO: 95.2 FL (ref 80–99)
MONOCYTES # BLD: 0.8 K/UL (ref 0–1)
MONOCYTES NFR BLD: 12 % (ref 5–13)
NEUTS SEG # BLD: 3.8 K/UL (ref 1.8–8)
NEUTS SEG NFR BLD: 61 % (ref 32–75)
NRBC # BLD: 0 K/UL (ref 0–0.01)
NRBC BLD-RTO: 0 PER 100 WBC
PLATELET # BLD AUTO: 174 K/UL (ref 150–400)
PMV BLD AUTO: 12.2 FL (ref 8.9–12.9)
POTASSIUM SERPL-SCNC: 4.1 MMOL/L (ref 3.5–5.1)
PROT SERPL-MCNC: 7.2 G/DL (ref 6.4–8.2)
RBC # BLD AUTO: 3.98 M/UL (ref 3.8–5.2)
SERVICE CMNT-IMP: NORMAL
SODIUM SERPL-SCNC: 138 MMOL/L (ref 136–145)
TRIGL SERPL-MCNC: 50 MG/DL
TSH SERPL DL<=0.05 MIU/L-ACNC: 2.78 UIU/ML (ref 0.36–3.74)
VLDLC SERPL CALC-MCNC: 10 MG/DL
WBC # BLD AUTO: 6.2 K/UL (ref 3.6–11)

## 2024-02-19 ENCOUNTER — TELEPHONE (OUTPATIENT)
Age: 89
End: 2024-02-19

## 2024-03-06 ENCOUNTER — TELEPHONE (OUTPATIENT)
Age: 89
End: 2024-03-06

## 2024-03-06 NOTE — TELEPHONE ENCOUNTER
Pt's daughter is calling thinking pt has another UTI wants to know if they can bring a urine sample up and drop off

## 2024-03-07 ENCOUNTER — OFFICE VISIT (OUTPATIENT)
Age: 89
End: 2024-03-07
Payer: MEDICARE

## 2024-03-07 VITALS
BODY MASS INDEX: 29.76 KG/M2 | OXYGEN SATURATION: 97 % | WEIGHT: 157.6 LBS | RESPIRATION RATE: 16 BRPM | TEMPERATURE: 98.1 F | HEART RATE: 62 BPM | DIASTOLIC BLOOD PRESSURE: 61 MMHG | SYSTOLIC BLOOD PRESSURE: 153 MMHG | HEIGHT: 61 IN

## 2024-03-07 DIAGNOSIS — R41.82 ALTERED MENTAL STATUS, UNSPECIFIED ALTERED MENTAL STATUS TYPE: Primary | ICD-10-CM

## 2024-03-07 DIAGNOSIS — R35.0 URINARY FREQUENCY: ICD-10-CM

## 2024-03-07 DIAGNOSIS — E55.9 VITAMIN D DEFICIENCY: ICD-10-CM

## 2024-03-07 DIAGNOSIS — R44.0 AUDITORY HALLUCINATION: ICD-10-CM

## 2024-03-07 LAB
BILIRUBIN, URINE, POC: NEGATIVE
BLOOD URINE, POC: NEGATIVE
GLUCOSE URINE, POC: NEGATIVE
KETONES, URINE, POC: NEGATIVE
LEUKOCYTE ESTERASE, URINE, POC: NEGATIVE
NITRITE, URINE, POC: NEGATIVE
PH, URINE, POC: 5.5 (ref 4.6–8)
PROTEIN,URINE, POC: NEGATIVE
SPECIFIC GRAVITY, URINE, POC: 1.02 (ref 1–1.03)
URINALYSIS CLARITY, POC: CLEAR
URINALYSIS COLOR, POC: YELLOW
UROBILINOGEN, POC: NORMAL

## 2024-03-07 PROCEDURE — 81002 URINALYSIS NONAUTO W/O SCOPE: CPT | Performed by: PHYSICIAN ASSISTANT

## 2024-03-07 PROCEDURE — 1036F TOBACCO NON-USER: CPT | Performed by: PHYSICIAN ASSISTANT

## 2024-03-07 PROCEDURE — 99213 OFFICE O/P EST LOW 20 MIN: CPT | Performed by: PHYSICIAN ASSISTANT

## 2024-03-07 PROCEDURE — 1123F ACP DISCUSS/DSCN MKR DOCD: CPT | Performed by: PHYSICIAN ASSISTANT

## 2024-03-07 PROCEDURE — G8427 DOCREV CUR MEDS BY ELIG CLIN: HCPCS | Performed by: PHYSICIAN ASSISTANT

## 2024-03-07 PROCEDURE — G8419 CALC BMI OUT NRM PARAM NOF/U: HCPCS | Performed by: PHYSICIAN ASSISTANT

## 2024-03-07 PROCEDURE — 1090F PRES/ABSN URINE INCON ASSESS: CPT | Performed by: PHYSICIAN ASSISTANT

## 2024-03-07 PROCEDURE — G8484 FLU IMMUNIZE NO ADMIN: HCPCS | Performed by: PHYSICIAN ASSISTANT

## 2024-03-07 PROCEDURE — PBSHW AMB POC URINALYSIS DIP STICK MANUAL W/O MICRO: Performed by: PHYSICIAN ASSISTANT

## 2024-03-07 SDOH — ECONOMIC STABILITY: FOOD INSECURITY: WITHIN THE PAST 12 MONTHS, YOU WORRIED THAT YOUR FOOD WOULD RUN OUT BEFORE YOU GOT MONEY TO BUY MORE.: NEVER TRUE

## 2024-03-07 SDOH — ECONOMIC STABILITY: FOOD INSECURITY: WITHIN THE PAST 12 MONTHS, THE FOOD YOU BOUGHT JUST DIDN'T LAST AND YOU DIDN'T HAVE MONEY TO GET MORE.: NEVER TRUE

## 2024-03-07 SDOH — ECONOMIC STABILITY: INCOME INSECURITY: HOW HARD IS IT FOR YOU TO PAY FOR THE VERY BASICS LIKE FOOD, HOUSING, MEDICAL CARE, AND HEATING?: NOT HARD AT ALL

## 2024-03-07 ASSESSMENT — PATIENT HEALTH QUESTIONNAIRE - PHQ9
1. LITTLE INTEREST OR PLEASURE IN DOING THINGS: 0
SUM OF ALL RESPONSES TO PHQ9 QUESTIONS 1 & 2: 0
SUM OF ALL RESPONSES TO PHQ QUESTIONS 1-9: 0
2. FEELING DOWN, DEPRESSED OR HOPELESS: 0

## 2024-03-07 NOTE — PROGRESS NOTES
Subjective:      Patient : This patient is a 89 y.o. female that presents today with a chief complaint of auditory hallucinations x 2 nights. She heard a different man singing Nondenominational songs again at night when by herself, the sound of singing woke her up at night. She does not hear it if talking to someone or has the TV on. Reports same sxs in late January and states she had a UTI. Currently no dysuria. The patient admits to chronic and unchanged urinary frequency, nocturia x 1-2x. No hematuria. No incontinence. The patient denies nausea, vomiting, and fever. The patient has no history of recent or recurrent UTIs.    Reports having had auditory hallucinations in late January, gave a urine sample 1/31/2024.  Urinalysis was clear, culture showed greater than 100,000 colonies of mixed amanda  Treated with keflex and auditory hallucinations resolved after about 3 days of the antibiotic    Last seen by PCP 2/14/2024.   Recheck of urinalysis and urine culture were negative at that time.    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    Objective:     BP (!) 153/61   Pulse 62   Temp 98.1 °F (36.7 °C) (Temporal)   Resp 16   Ht 1.549 m (5' 1\")   Wt 71.5 kg (157 lb 9.6 oz)   SpO2 97%   BMI 29.78 kg/m²       General: appears well, responds appropriately, daughter at bedside  Mental status: A&Ox4  Skin: no pallor, normal turgor  HEENT:

## 2024-03-07 NOTE — PROGRESS NOTES
Chief Complaint   Patient presents with    Urinary Tract Infection     Re-current         Health Maintenance Due   Topic Date Due    COVID-19 Vaccine (1) Never done    Respiratory Syncytial Virus (RSV) Pregnant or age 60 yrs+ (1 - 1-dose 60+ series) Never done         \"Have you been to the ER, urgent care clinic since your last visit?  Hospitalized since your last visit?\"    NO    “Have you seen or consulted any other health care providers outside of [facility] since your last visit?”    Cardiology

## 2024-03-07 NOTE — PATIENT INSTRUCTIONS
Increase water intake each day.     Instead of just drinking 1 cup of water each morning, I want you to drink 4 cups of water throughout the day. Get all of this water in before dinner time.

## 2024-03-08 ENCOUNTER — TELEPHONE (OUTPATIENT)
Age: 89
End: 2024-03-08

## 2024-03-08 LAB
FOLATE SERPL-MCNC: NORMAL NG/ML (ref 5–21)
VIT B12 SERPL-MCNC: 260 PG/ML (ref 193–986)

## 2024-03-08 RX ORDER — CYANOCOBALAMIN 1000 UG/ML
INJECTION, SOLUTION INTRAMUSCULAR; SUBCUTANEOUS
Qty: 8 ML | Refills: 0 | Status: SHIPPED | OUTPATIENT
Start: 2024-03-08 | End: 2024-07-11

## 2024-03-08 NOTE — TELEPHONE ENCOUNTER
----- Message from Natasha Hills PA-C sent at 3/8/2024  1:46 PM EST -----  Regarding: RE: B12, auditory hallucinations  If you can order B12 shots so the follow up comes to you, then that would be good (but I don't mind since I started it all!)    I didn't feel comfortable just yet giving her an antibiotic with a clean urine based on symptoms of hallucinations since it is relatively new for her    I told her to call on call doctor over the weekend if the hallucinations worsen since we wouldn't have urine cx results before the weekend    I will send Wendy a message to check urine results for me on Monday since I am not in the office that day        ----- Message -----  From: Kiera Correa MD  Sent: 3/8/2024   1:38 PM EST  To: Natasha Hills PA-C  Subject: RE: B12, auditory hallucinations                 Thank you for reaching out!    I do think B12 injections would be a great idea  -would you like me to order them?    I would be surprised if this helps with hallucinations given that her levels are not too severely low.     I appreciate the excellent care that you are providing to our patients.       ----- Message -----  From: Natasha Hills PA-C  Sent: 3/8/2024   1:13 PM EST  To: Kiera Correa MD  Subject: B12, auditory hallucinations                     Hi Dr Correa,  I saw Ms Fried  Auditory hallucinations again, wondering is she has UTI    UA normal  Culture still pending, I will keep you updated    Also fatigue for several months (which I now realize I didn't put in my office note)    B12 is 260    I told her to take OTC B12    But I thought you might want to start her on B12 injections    If so, we can combine that all in one message when I need to call her again when we get the urine cx results back, just let me know    I assume y'all do nurse visits B12 qwk x 5 weeks, then monthly, with recheck at 3 months?    Any chance this would help hallucinations if urine cx negative?    Natasha

## 2024-03-08 NOTE — TELEPHONE ENCOUNTER
B12 ordered, please call pt to schedule weekly B12 injections x 5 weeks once urine culture has resulted.

## 2024-03-09 LAB
BACTERIA SPEC CULT: NORMAL
SERVICE CMNT-IMP: NORMAL

## 2024-03-10 LAB — FOLATE SERPL-MCNC: 19.7 NG/ML

## 2024-03-13 ENCOUNTER — NURSE ONLY (OUTPATIENT)
Age: 89
End: 2024-03-13
Payer: MEDICARE

## 2024-03-13 DIAGNOSIS — Z79.01 LONG TERM (CURRENT) USE OF ANTICOAGULANTS: ICD-10-CM

## 2024-03-13 DIAGNOSIS — E53.8 B12 DEFICIENCY: Primary | ICD-10-CM

## 2024-03-13 LAB
POC INR: 2.2
PROTHROMBIN TIME, POC: 26.6

## 2024-03-13 PROCEDURE — 85610 PROTHROMBIN TIME: CPT | Performed by: FAMILY MEDICINE

## 2024-03-13 PROCEDURE — 96372 THER/PROPH/DIAG INJ SC/IM: CPT | Performed by: FAMILY MEDICINE

## 2024-03-13 PROCEDURE — PBSHW AMB POC PT/INR: Performed by: FAMILY MEDICINE

## 2024-03-13 PROCEDURE — 20552 NJX 1/MLT TRIGGER POINT 1/2: CPT | Performed by: FAMILY MEDICINE

## 2024-03-13 PROCEDURE — PBSHW PBB SHADOW CHARGE: Performed by: FAMILY MEDICINE

## 2024-03-13 RX ORDER — CYANOCOBALAMIN 1000 UG/ML
1000 INJECTION, SOLUTION INTRAMUSCULAR; SUBCUTANEOUS ONCE
Status: COMPLETED | OUTPATIENT
Start: 2024-03-13 | End: 2024-03-13

## 2024-03-13 RX ADMIN — CYANOCOBALAMIN 1000 MCG: 1000 INJECTION INTRAMUSCULAR; SUBCUTANEOUS at 09:46

## 2024-03-13 NOTE — PROGRESS NOTES
Sobeida Fried who presents today for Anticoagulation monitoring.    Indication: Atrial Fibrillation  INR Goal: 2.0-3.0.  Today's INR:2.2  Previous INR: 2.8 2/14/2024  Current dose: 5 mg daily   Missed Coumadin Doses:  None  Medication Changes:  no  Dietary Changes:  no     Symptoms: taking coumadin appropriately without any bleeding.       New Coumadin dose:.current treatment plan is effective, no change in therapy.     Next check to be scheduled for 4 weeks.

## 2024-03-20 ENCOUNTER — NURSE ONLY (OUTPATIENT)
Age: 89
End: 2024-03-20
Payer: MEDICARE

## 2024-03-20 DIAGNOSIS — E53.8 B12 DEFICIENCY: Primary | ICD-10-CM

## 2024-03-20 PROCEDURE — PBSHW PBB SHADOW CHARGE: Performed by: FAMILY MEDICINE

## 2024-03-20 PROCEDURE — 96372 THER/PROPH/DIAG INJ SC/IM: CPT | Performed by: FAMILY MEDICINE

## 2024-03-20 RX ORDER — CYANOCOBALAMIN 1000 UG/ML
1000 INJECTION, SOLUTION INTRAMUSCULAR; SUBCUTANEOUS ONCE
Status: COMPLETED | OUTPATIENT
Start: 2024-03-20 | End: 2024-03-20

## 2024-03-20 RX ADMIN — CYANOCOBALAMIN 1000 MCG: 1000 INJECTION, SOLUTION INTRAMUSCULAR at 10:25

## 2024-03-20 NOTE — PROGRESS NOTES
After obtaining consent, and per orders of Dr. Correa, injection of B12 given in Left deltoid by Brie Murillo MA. Patient instructed to remain in clinic for 10 minutes afterwards, and to report any adverse reaction to me immediately.

## 2024-03-27 ENCOUNTER — NURSE ONLY (OUTPATIENT)
Age: 89
End: 2024-03-27
Payer: MEDICARE

## 2024-03-27 DIAGNOSIS — E53.8 B12 DEFICIENCY: Primary | ICD-10-CM

## 2024-03-27 PROCEDURE — PBSHW PBB SHADOW CHARGE: Performed by: FAMILY MEDICINE

## 2024-03-27 PROCEDURE — 96372 THER/PROPH/DIAG INJ SC/IM: CPT

## 2024-03-27 RX ORDER — CYANOCOBALAMIN 1000 UG/ML
1000 INJECTION, SOLUTION INTRAMUSCULAR; SUBCUTANEOUS ONCE
Status: COMPLETED | OUTPATIENT
Start: 2024-03-27 | End: 2024-03-27

## 2024-03-27 RX ADMIN — CYANOCOBALAMIN 1000 MCG: 1000 INJECTION INTRAMUSCULAR; SUBCUTANEOUS at 10:14

## 2024-04-03 ENCOUNTER — NURSE ONLY (OUTPATIENT)
Age: 89
End: 2024-04-03
Payer: MEDICARE

## 2024-04-03 DIAGNOSIS — E53.8 B12 DEFICIENCY: Primary | ICD-10-CM

## 2024-04-03 PROCEDURE — PBSHW PBB SHADOW CHARGE: Performed by: FAMILY MEDICINE

## 2024-04-03 PROCEDURE — 96372 THER/PROPH/DIAG INJ SC/IM: CPT | Performed by: FAMILY MEDICINE

## 2024-04-03 RX ORDER — CYANOCOBALAMIN 1000 UG/ML
1000 INJECTION, SOLUTION INTRAMUSCULAR; SUBCUTANEOUS ONCE
Status: COMPLETED | OUTPATIENT
Start: 2024-04-03 | End: 2024-04-03

## 2024-04-03 RX ADMIN — CYANOCOBALAMIN 1000 MCG: 1000 INJECTION INTRAMUSCULAR; SUBCUTANEOUS at 10:08

## 2024-04-10 ENCOUNTER — NURSE ONLY (OUTPATIENT)
Age: 89
End: 2024-04-10
Payer: MEDICARE

## 2024-04-10 DIAGNOSIS — Z79.01 LONG TERM (CURRENT) USE OF ANTICOAGULANTS: ICD-10-CM

## 2024-04-10 DIAGNOSIS — E53.8 B12 DEFICIENCY: Primary | ICD-10-CM

## 2024-04-10 LAB
POC INR: 1.7
PROTHROMBIN TIME, POC: 20.2

## 2024-04-10 PROCEDURE — 96372 THER/PROPH/DIAG INJ SC/IM: CPT | Performed by: FAMILY MEDICINE

## 2024-04-10 PROCEDURE — 85610 PROTHROMBIN TIME: CPT | Performed by: FAMILY MEDICINE

## 2024-04-10 PROCEDURE — PBSHW PBB SHADOW CHARGE: Performed by: FAMILY MEDICINE

## 2024-04-10 PROCEDURE — PBSHW AMB POC PT/INR: Performed by: FAMILY MEDICINE

## 2024-04-10 RX ORDER — CYANOCOBALAMIN 1000 UG/ML
1000 INJECTION, SOLUTION INTRAMUSCULAR; SUBCUTANEOUS ONCE
Status: COMPLETED | OUTPATIENT
Start: 2024-04-10 | End: 2024-04-10

## 2024-04-10 RX ADMIN — CYANOCOBALAMIN 1000 MCG: 1000 INJECTION, SOLUTION INTRAMUSCULAR; SUBCUTANEOUS at 10:21

## 2024-04-10 NOTE — PROGRESS NOTES
Sobeida Fried is a 89 y.o. female who presents today for Anticoagulation monitoring.    Indication: atrial fibrillation  INR Goal: 2.0-3.0.  Current dose:  Coumadin 5mg daily.  Missed Coumadin Doses:  None  Medication Changes:  no  Dietary Changes:  no    Symptoms: taking coumadin appropriately without any bleeding.    Latest INRs:  Lab Results   Component Value Date/Time    INR 2.2 03/13/2024 09:31 AM    INR 2.8 02/14/2024 09:13 AM    INR 2.7 01/17/2024 10:14 AM    INR 1.8 08/25/2022 09:59 AM    INR 2.2 07/21/2022 01:35 PM    INR 2.5 06/09/2022 10:37 AM        New Coumadin dose:.the following changes are made 10mg on Thursday 5mg all other days.     Next check to be scheduled for  2 weeks.

## 2024-04-19 NOTE — TELEPHONE ENCOUNTER
Patients daughter is calling in for medication refill for patient. Patient only has one pill left. Also asking if it could be sent in for 90 day supply.         levothyroxine (SYNTHROID) 50 MCG tablet

## 2024-04-21 RX ORDER — LEVOTHYROXINE SODIUM 0.05 MG/1
50 TABLET ORAL
Qty: 90 TABLET | Refills: 1 | Status: SHIPPED | OUTPATIENT
Start: 2024-04-21

## 2024-04-22 RX ORDER — LEVOTHYROXINE SODIUM 0.05 MG/1
50 TABLET ORAL
Qty: 90 TABLET | Refills: 1 | OUTPATIENT
Start: 2024-04-22

## 2024-04-22 NOTE — TELEPHONE ENCOUNTER
Patients daughter is calling in for refill. Patient is completely out of medication.           levothyroxine (SYNTHROID) 50 MCG tablet

## 2024-04-24 ENCOUNTER — TELEPHONE (OUTPATIENT)
Age: 89
End: 2024-04-24

## 2024-04-24 ENCOUNTER — NURSE ONLY (OUTPATIENT)
Age: 89
End: 2024-04-24
Payer: MEDICARE

## 2024-04-24 DIAGNOSIS — Z79.01 LONG TERM (CURRENT) USE OF ANTICOAGULANTS: ICD-10-CM

## 2024-04-24 DIAGNOSIS — E53.8 B12 DEFICIENCY: Primary | ICD-10-CM

## 2024-04-24 LAB
POC INR: 3.6
PROTHROMBIN TIME, POC: 42.9

## 2024-04-24 PROCEDURE — 85610 PROTHROMBIN TIME: CPT | Performed by: FAMILY MEDICINE

## 2024-04-24 PROCEDURE — PBSHW AMB POC PT/INR: Performed by: FAMILY MEDICINE

## 2024-04-24 NOTE — PROGRESS NOTES
Sobeida Fried is a 89 y.o. female who presents today for Anticoagulation monitoring.    Indication: atrial fibrillation  INR Goal: 2.0-3.0.  Current dose:  Coumadin 10mg on Thursday 5mg all other days   Missed Coumadin Doses:  None  Medication Changes:  no  Dietary Changes:  no    Symptoms: taking coumadin appropriately without any bleeding.    Latest INRs:  Lab Results   Component Value Date/Time    INR 1.7 04/10/2024 10:18 AM    INR 2.2 03/13/2024 09:31 AM    INR 2.8 02/14/2024 09:13 AM    INR 1.8 08/25/2022 09:59 AM    INR 2.2 07/21/2022 01:35 PM    INR 2.5 06/09/2022 10:37 AM        New Coumadin dose:.changes are made - Please See 's Telephone Note. Pt was instructed to take 5 mg daily and recheck inr 5/8.     Next check to be scheduled for  2 weeks.

## 2024-04-24 NOTE — TELEPHONE ENCOUNTER
Zakiya the daughter that came in with pt is calling about med and directions wanting to know why she is to double the med please give a call back explaining this

## 2024-04-24 NOTE — TELEPHONE ENCOUNTER
Spoke with patients daughter and went over the new INR directions. Pt's daughter voiced understanding.

## 2024-04-26 ENCOUNTER — TELEPHONE (OUTPATIENT)
Age: 89
End: 2024-04-26

## 2024-04-26 NOTE — TELEPHONE ENCOUNTER
PT daughter is came into the office to requesting a call back to discuss her meds. Pt is concerned about her bp meds being to much for her    Please call back to discuss

## 2024-04-26 NOTE — TELEPHONE ENCOUNTER
Daughters are concern about pt's new warfarin dose.     Pt was here on Wednesday and her INR was 3.6  Per Dr. Correa pt was told to hold her Warfarin on Wednesday and Thursday and take 10 mg daily expect on Tuesdays and Thursdays take 5 mg and follow-up in 2 weeks     Pt's previous INR on 4/10/2024 was 1.7 and her dose was changed to 5 mg daily expect on Thursdays take 10 mg     Pt is due to restart warfarin today and daughters are uncomfortable with giving pt 10 mg of warfarin and would like a 2nd opinion

## 2024-04-26 NOTE — TELEPHONE ENCOUNTER
Reviewed the anticoagulation calendar and corrected.    Her new dose will be 5 mg daily when she resumes tomorrow.  Okay to get her next INR sometime after Friday the 3rd.  Okay to wait until May 8 if she prefers to come in on Wednesday

## 2024-04-26 NOTE — TELEPHONE ENCOUNTER
Spoke to pt's daughter (pam) and gave the information for the new INR dose. Pts daughter voiced understanding.

## 2024-05-06 ENCOUNTER — NURSE ONLY (OUTPATIENT)
Age: 89
End: 2024-05-06
Payer: MEDICARE

## 2024-05-06 DIAGNOSIS — E53.8 B12 DEFICIENCY: Primary | ICD-10-CM

## 2024-05-06 DIAGNOSIS — Z79.01 LONG TERM (CURRENT) USE OF ANTICOAGULANTS: ICD-10-CM

## 2024-05-06 LAB
POC INR: 2.3
PROTHROMBIN TIME, POC: 27

## 2024-05-06 PROCEDURE — PBSHW AMB POC PT/INR: Performed by: FAMILY MEDICINE

## 2024-05-06 PROCEDURE — 85610 PROTHROMBIN TIME: CPT | Performed by: FAMILY MEDICINE

## 2024-05-06 PROCEDURE — PBSHW PBB SHADOW CHARGE: Performed by: FAMILY MEDICINE

## 2024-05-06 PROCEDURE — 96372 THER/PROPH/DIAG INJ SC/IM: CPT | Performed by: FAMILY MEDICINE

## 2024-05-06 RX ORDER — CYANOCOBALAMIN 1000 UG/ML
1000 INJECTION, SOLUTION INTRAMUSCULAR; SUBCUTANEOUS ONCE
Status: COMPLETED | OUTPATIENT
Start: 2024-05-06 | End: 2024-05-06

## 2024-05-06 RX ADMIN — CYANOCOBALAMIN 1000 MCG: 1000 INJECTION, SOLUTION INTRAMUSCULAR at 13:04

## 2024-05-06 NOTE — PROGRESS NOTES
Sobeida Fried who presents today for Anticoagulation monitoring.    Indication: Atrial Fibrillation  INR Goal: 2.0-3.0.  Today's INR: 2.3  Previous INR 3.6  Current dose:  5 mg daily   Missed Coumadin Doses:  None  Medication Changes:  no  Dietary Changes:  no     Symptoms: taking coumadin appropriately without any bleeding.       New Coumadin dose:.current treatment plan is effective, no change in therapy.     Next check to be scheduled for 2 weeks.      After obtaining consent, and per orders of Dr. Wilson, injection of B12 given in Left deltoid by Brie Murillo MA. Patient instructed to remain in clinic for 10 minutes afterwards, and to report any adverse reaction to me immediately.

## 2024-05-20 ENCOUNTER — NURSE ONLY (OUTPATIENT)
Age: 89
End: 2024-05-20
Payer: MEDICARE

## 2024-05-20 DIAGNOSIS — Z79.01 LONG TERM (CURRENT) USE OF ANTICOAGULANTS: Primary | ICD-10-CM

## 2024-05-20 LAB
POC INR: 2.7
PROTHROMBIN TIME, POC: 32.7

## 2024-05-20 PROCEDURE — PBSHW AMB POC PT/INR: Performed by: FAMILY MEDICINE

## 2024-05-20 PROCEDURE — 85610 PROTHROMBIN TIME: CPT | Performed by: FAMILY MEDICINE

## 2024-05-20 RX ORDER — HYDROCHLOROTHIAZIDE 25 MG/1
25 TABLET ORAL DAILY
Qty: 90 TABLET | Refills: 3 | Status: SHIPPED | OUTPATIENT
Start: 2024-05-20

## 2024-05-20 NOTE — TELEPHONE ENCOUNTER
PCP: Kiera Correa MD    Last appt: 2/14/2024    Future Appointments   Date Time Provider Department Center   8/2/2024  9:00 AM Martin Wilson MD [facility] AMB   8/14/2024 10:00 AM Kiera Correa MD [facility] AMB       Requested Prescriptions     Pending Prescriptions Disp Refills    hydroCHLOROthiazide (HYDRODIURIL) 25 MG tablet 90 tablet 3     Sig: Take 1 tablet by mouth daily

## 2024-05-20 NOTE — PROGRESS NOTES
Sobeida Fried who presents today for Anticoagulation monitoring.    Indication: Atrial Fibrillation  INR Goal: 2.0-3.0.  Today's INR: 2.7  Previous INR: 2.3 5/6  Current dose: 5 mg daily   Missed Coumadin Doses:  None  Medication Changes:  no  Dietary Changes:  no     Symptoms: taking coumadin appropriately without any bleeding.       New Coumadin dose:.current treatment plan is effective, no change in therapy.     Next check to be scheduled for 4 weeks.

## 2024-06-17 ENCOUNTER — NURSE ONLY (OUTPATIENT)
Age: 89
End: 2024-06-17
Payer: MEDICARE

## 2024-06-17 DIAGNOSIS — E53.8 B12 DEFICIENCY: ICD-10-CM

## 2024-06-17 DIAGNOSIS — Z79.01 LONG TERM (CURRENT) USE OF ANTICOAGULANTS: Primary | ICD-10-CM

## 2024-06-17 LAB
POC INR: 2.6
PROTHROMBIN TIME, POC: 31

## 2024-06-17 PROCEDURE — 96372 THER/PROPH/DIAG INJ SC/IM: CPT | Performed by: FAMILY MEDICINE

## 2024-06-17 PROCEDURE — 85610 PROTHROMBIN TIME: CPT | Performed by: FAMILY MEDICINE

## 2024-06-17 PROCEDURE — PBSHW PBB SHADOW CHARGE: Performed by: FAMILY MEDICINE

## 2024-06-17 PROCEDURE — PBSHW AMB POC PT/INR: Performed by: FAMILY MEDICINE

## 2024-06-17 RX ORDER — CYANOCOBALAMIN 1000 UG/ML
1000 INJECTION, SOLUTION INTRAMUSCULAR; SUBCUTANEOUS ONCE
Status: COMPLETED | OUTPATIENT
Start: 2024-06-17 | End: 2024-06-17

## 2024-06-17 RX ADMIN — CYANOCOBALAMIN 1000 MCG: 1000 INJECTION, SOLUTION INTRAMUSCULAR at 09:51

## 2024-06-17 NOTE — PROGRESS NOTES
Sobeida Fried is a 89 y.o. female who presents today for Anticoagulation monitoring.    Indication: atrial fibrillation  INR Goal: 2.0-3.0.  Current dose:  Coumadin 5mg daily.  Missed Coumadin Doses:  None  Medication Changes:  no  Dietary Changes:  no    Symptoms: taking coumadin appropriately without any bleeding.    Latest INRs:  Lab Results   Component Value Date/Time    INR 2.7 05/20/2024 10:13 AM    INR 2.3 05/06/2024 10:54 AM    INR 3.6 04/24/2024 10:08 AM    INR 1.8 08/25/2022 09:59 AM    INR 2.2 07/21/2022 01:35 PM    INR 2.5 06/09/2022 10:37 AM        New Coumadin dose:.current treatment plan is effective, no change in therapy.    Next check to be scheduled for  4 weeks.

## 2024-07-11 ENCOUNTER — TELEPHONE (OUTPATIENT)
Facility: CLINIC | Age: 89
End: 2024-07-11

## 2024-07-11 NOTE — TELEPHONE ENCOUNTER
Pt daughter calling to speak to a nurse about when pt should come in for ptinr check. Please call [phone]

## 2024-07-22 ENCOUNTER — TELEPHONE (OUTPATIENT)
Facility: CLINIC | Age: 89
End: 2024-07-22

## 2024-07-25 ENCOUNTER — NURSE ONLY (OUTPATIENT)
Age: 89
End: 2024-07-25
Payer: MEDICARE

## 2024-07-25 ENCOUNTER — TELEPHONE (OUTPATIENT)
Age: 89
End: 2024-07-25

## 2024-07-25 VITALS — DIASTOLIC BLOOD PRESSURE: 62 MMHG | HEART RATE: 57 BPM | SYSTOLIC BLOOD PRESSURE: 136 MMHG | OXYGEN SATURATION: 96 %

## 2024-07-25 DIAGNOSIS — R35.0 URINE FREQUENCY: ICD-10-CM

## 2024-07-25 DIAGNOSIS — E53.8 B12 DEFICIENCY: ICD-10-CM

## 2024-07-25 DIAGNOSIS — Z79.01 LONG TERM (CURRENT) USE OF ANTICOAGULANTS: Primary | ICD-10-CM

## 2024-07-25 LAB
BILIRUBIN, URINE, POC: NEGATIVE
BLOOD URINE, POC: NEGATIVE
GLUCOSE URINE, POC: NEGATIVE
KETONES, URINE, POC: NEGATIVE
LEUKOCYTE ESTERASE, URINE, POC: NEGATIVE
NITRITE, URINE, POC: NEGATIVE
PH, URINE, POC: 5.5 (ref 4.6–8)
POC INR: 3.7
PROTEIN,URINE, POC: NEGATIVE
PROTHROMBIN TIME, POC: 44.8
SPECIFIC GRAVITY, URINE, POC: 1.02 (ref 1–1.03)
URINALYSIS CLARITY, POC: CLEAR
URINALYSIS COLOR, POC: YELLOW
UROBILINOGEN, POC: NORMAL

## 2024-07-25 PROCEDURE — 81001 URINALYSIS AUTO W/SCOPE: CPT | Performed by: FAMILY MEDICINE

## 2024-07-25 PROCEDURE — PBSHW PBB SHADOW CHARGE: Performed by: FAMILY MEDICINE

## 2024-07-25 PROCEDURE — 96372 THER/PROPH/DIAG INJ SC/IM: CPT | Performed by: FAMILY MEDICINE

## 2024-07-25 PROCEDURE — PBSHW AMB POC PT/INR: Performed by: FAMILY MEDICINE

## 2024-07-25 PROCEDURE — PBSHW AMB POC URINALYSIS DIP STICK AUTO W/ MICRO: Performed by: FAMILY MEDICINE

## 2024-07-25 PROCEDURE — 85610 PROTHROMBIN TIME: CPT | Performed by: FAMILY MEDICINE

## 2024-07-25 RX ORDER — NITROFURANTOIN 25; 75 MG/1; MG/1
100 CAPSULE ORAL 2 TIMES DAILY
Qty: 14 CAPSULE | Refills: 0 | Status: SHIPPED | OUTPATIENT
Start: 2024-07-25 | End: 2024-08-01

## 2024-07-25 RX ORDER — CYANOCOBALAMIN 1000 UG/ML
1000 INJECTION, SOLUTION INTRAMUSCULAR; SUBCUTANEOUS ONCE
Status: COMPLETED | OUTPATIENT
Start: 2024-07-25 | End: 2024-07-25

## 2024-07-25 RX ADMIN — CYANOCOBALAMIN 1000 MCG: 1000 INJECTION, SOLUTION INTRAMUSCULAR at 10:26

## 2024-07-25 NOTE — PROGRESS NOTES
Patient is uncomfortable, unwilling to wait for culture results.  Would like an antibiotic before the weekend.  Macrobid was sent in

## 2024-07-25 NOTE — TELEPHONE ENCOUNTER
verified with pt daughter. Informed of medication sent to pharmacy for patient. Daughter verified understanding and had no further questions.

## 2024-07-25 NOTE — TELEPHONE ENCOUNTER
Pt came in for nurse visit for INR, Bp check, drop off urine for urine frequency, and b12 injection. Ran u/a and also sent for culture. Pt was requesting for antibiotic to be sent in to get her through the weekend until culture results come back. Would like to be sent to [facility] in [place].

## 2024-07-25 NOTE — PROGRESS NOTES
Sobeida Fried is a 89 y.o. female who presents today for Anticoagulation monitoring.    Indication: atrial fibrillation  INR Goal: 2.0-3.0.  Current dose:  Coumadin 5 mg daily.  Missed Coumadin Doses:  None  Medication Changes:  no  Dietary Changes:  no    Symptoms: taking coumadin appropriately without any bleeding.    Latest INRs:  Lab Results   Component Value Date/Time    INR 2.6 06/17/2024 09:59 AM    INR 2.7 05/20/2024 10:13 AM    INR 2.3 05/06/2024 10:54 AM    INR 1.8 08/25/2022 09:59 AM    INR 2.2 07/21/2022 01:35 PM    INR 2.5 06/09/2022 10:37 AM        New Coumadin dose:. 2.5 mg every Tue; 5 mg all other days     Next check to be scheduled for  1 weeks.

## 2024-07-25 NOTE — PROGRESS NOTES
Pt also dropped urine. Pt states she has been having urine frequency, \"feels like she goes to the bathroom to urinate every hour.\" Ran u/a and also sent for a culture. Pt denies any other symptoms.

## 2024-07-26 LAB
BACTERIA SPEC CULT: NORMAL
CC UR VC: NORMAL
COMMENT:: NORMAL
SERVICE CMNT-IMP: NORMAL
SPECIMEN HOLD: NORMAL

## 2024-07-29 ENCOUNTER — TELEPHONE (OUTPATIENT)
Age: 89
End: 2024-07-29

## 2024-07-29 NOTE — TELEPHONE ENCOUNTER
Urine culture did not grow any bacteria.  This makes a urinary tract infection unlikely.  Hope she is feeling better but if not we will need to consider other possible causes for the symptoms that she has been having

## 2024-07-29 NOTE — TELEPHONE ENCOUNTER
Spoke with Anya the pt's daughter and she states pt is feeling much better and has an appt with Dr Wilson this Friday.

## 2024-08-02 ENCOUNTER — OFFICE VISIT (OUTPATIENT)
Age: 89
End: 2024-08-02
Payer: MEDICARE

## 2024-08-02 VITALS
SYSTOLIC BLOOD PRESSURE: 185 MMHG | TEMPERATURE: 98.2 F | BODY MASS INDEX: 28.92 KG/M2 | HEART RATE: 74 BPM | HEIGHT: 61 IN | WEIGHT: 153.2 LBS | OXYGEN SATURATION: 94 % | RESPIRATION RATE: 17 BRPM | DIASTOLIC BLOOD PRESSURE: 80 MMHG

## 2024-08-02 DIAGNOSIS — I10 PRIMARY HYPERTENSION: ICD-10-CM

## 2024-08-02 DIAGNOSIS — E55.9 VITAMIN D DEFICIENCY: ICD-10-CM

## 2024-08-02 DIAGNOSIS — E61.1 IRON DEFICIENCY: ICD-10-CM

## 2024-08-02 DIAGNOSIS — E03.9 HYPOTHYROIDISM, UNSPECIFIED TYPE: ICD-10-CM

## 2024-08-02 DIAGNOSIS — E53.8 B12 DEFICIENCY: ICD-10-CM

## 2024-08-02 DIAGNOSIS — E78.5 HYPERLIPIDEMIA, UNSPECIFIED HYPERLIPIDEMIA TYPE: ICD-10-CM

## 2024-08-02 DIAGNOSIS — Z79.01 LONG TERM (CURRENT) USE OF ANTICOAGULANTS: ICD-10-CM

## 2024-08-02 DIAGNOSIS — Z00.00 ROUTINE GENERAL MEDICAL EXAMINATION AT A HEALTH CARE FACILITY: Primary | ICD-10-CM

## 2024-08-02 DIAGNOSIS — I48.11 LONGSTANDING PERSISTENT ATRIAL FIBRILLATION (HCC): ICD-10-CM

## 2024-08-02 LAB
POC INR: 3.9
PROTHROMBIN TIME, POC: 47

## 2024-08-02 PROCEDURE — 85610 PROTHROMBIN TIME: CPT | Performed by: FAMILY MEDICINE

## 2024-08-02 PROCEDURE — 99214 OFFICE O/P EST MOD 30 MIN: CPT | Performed by: FAMILY MEDICINE

## 2024-08-02 SDOH — ECONOMIC STABILITY: FOOD INSECURITY: WITHIN THE PAST 12 MONTHS, THE FOOD YOU BOUGHT JUST DIDN'T LAST AND YOU DIDN'T HAVE MONEY TO GET MORE.: NEVER TRUE

## 2024-08-02 SDOH — ECONOMIC STABILITY: FOOD INSECURITY: WITHIN THE PAST 12 MONTHS, YOU WORRIED THAT YOUR FOOD WOULD RUN OUT BEFORE YOU GOT MONEY TO BUY MORE.: NEVER TRUE

## 2024-08-02 SDOH — ECONOMIC STABILITY: INCOME INSECURITY: HOW HARD IS IT FOR YOU TO PAY FOR THE VERY BASICS LIKE FOOD, HOUSING, MEDICAL CARE, AND HEATING?: NOT HARD AT ALL

## 2024-08-02 ASSESSMENT — PATIENT HEALTH QUESTIONNAIRE - PHQ9
SUM OF ALL RESPONSES TO PHQ QUESTIONS 1-9: 2
1. LITTLE INTEREST OR PLEASURE IN DOING THINGS: SEVERAL DAYS
SUM OF ALL RESPONSES TO PHQ9 QUESTIONS 1 & 2: 2
2. FEELING DOWN, DEPRESSED OR HOPELESS: SEVERAL DAYS
SUM OF ALL RESPONSES TO PHQ QUESTIONS 1-9: 2

## 2024-08-02 ASSESSMENT — LIFESTYLE VARIABLES: HOW MANY STANDARD DRINKS CONTAINING ALCOHOL DO YOU HAVE ON A TYPICAL DAY: PATIENT DOES NOT DRINK

## 2024-08-02 NOTE — PROGRESS NOTES
Chief Complaint   Patient presents with    Medicare AWV         Health Maintenance Due   Topic Date Due    COVID-19 Vaccine (1) Never done    Shingles vaccine (1 of 2) Never done    Respiratory Syncytial Virus (RSV) Pregnant or age 60 yrs+ (1 - 1-dose 60+ series) Never done    Annual Wellness Visit (Medicare)  07/10/2024    Flu vaccine (1) 08/01/2024         \"Have you been to the ER, urgent care clinic since your last visit?  Hospitalized since your last visit?\"    NO    “Have you seen or consulted any other health care providers outside of [facility] since your last visit?”    NO           
pressure      Hypothyroid  TSH T4      Persistent A-fib  Rate control  Warfarin target 2-3  Diltiazem  Cardiology, had been seeing Dr. Chang, DOV.  Last note on file 2022.  Has been seeing cardiology every 6 months    Auditory hallucinations  Fairly infrequent, once every couple of weeks, at night  This is not necessarily pathologic and I would expect it to be more related to primary brain rather than secondary to other infection/inflammation.  Consider the possibility of an early dementia but there were no signs of other realms of dysfunction based on a my cursory screening questions  Encouraged her to take a positive attitude toward the auditory hallucinations which are uniformly pleasant  We do have antipsychotic medication for this but I do not wholeheartedly recommend this, would only recommend medication if she feels distress or is incapable of sleep        ICD-10-CM    1. Routine general medical examination at a health care facility  Z00.00       2. Long term (current) use of anticoagulants  Z79.01 AMB POC PT/INR      3. B12 deficiency  E53.8 Vitamin B12 & Folate      4. Iron deficiency  E61.1 Ferritin     Iron and TIBC      5. Vitamin D deficiency  E55.9 Vitamin D 25 Hydroxy      6. Primary hypertension  I10 CBC with Auto Differential     Comprehensive Metabolic Panel      7. Hypothyroidism, unspecified type  E03.9 TSH     T4, Free      8. Hyperlipidemia, unspecified hyperlipidemia type  E78.5 Lipid Panel      9. Longstanding persistent atrial fibrillation (HCC)  I48.11

## 2024-08-02 NOTE — PATIENT INSTRUCTIONS
Check your blood pressure at home.  Write the numbers down.  Give us a call before 8/6 and list off the blood pressures

## 2024-08-03 LAB
25(OH)D3 SERPL-MCNC: 36.5 NG/ML (ref 30–100)
ALBUMIN SERPL-MCNC: 3.9 G/DL (ref 3.5–5)
ALBUMIN/GLOB SERPL: 1.2 (ref 1.1–2.2)
ALP SERPL-CCNC: 91 U/L (ref 45–117)
ALT SERPL-CCNC: 28 U/L (ref 12–78)
ANION GAP SERPL CALC-SCNC: 6 MMOL/L (ref 5–15)
AST SERPL-CCNC: 27 U/L (ref 15–37)
BASOPHILS # BLD: 0 K/UL (ref 0–0.1)
BASOPHILS NFR BLD: 0 % (ref 0–1)
BILIRUB SERPL-MCNC: 0.6 MG/DL (ref 0.2–1)
BUN SERPL-MCNC: 25 MG/DL (ref 6–20)
BUN/CREAT SERPL: 24 (ref 12–20)
CALCIUM SERPL-MCNC: 9.4 MG/DL (ref 8.5–10.1)
CHLORIDE SERPL-SCNC: 105 MMOL/L (ref 97–108)
CHOLEST SERPL-MCNC: 179 MG/DL
CO2 SERPL-SCNC: 28 MMOL/L (ref 21–32)
CREAT SERPL-MCNC: 1.05 MG/DL (ref 0.55–1.02)
DIFFERENTIAL METHOD BLD: ABNORMAL
EOSINOPHIL # BLD: 0.1 K/UL (ref 0–0.4)
EOSINOPHIL NFR BLD: 2 % (ref 0–7)
ERYTHROCYTE [DISTWIDTH] IN BLOOD BY AUTOMATED COUNT: 15.2 % (ref 11.5–14.5)
FERRITIN SERPL-MCNC: 298 NG/ML (ref 26–388)
FOLATE SERPL-MCNC: 34.7 NG/ML (ref 5–21)
GLOBULIN SER CALC-MCNC: 3.2 G/DL (ref 2–4)
GLUCOSE SERPL-MCNC: 111 MG/DL (ref 65–100)
HCT VFR BLD AUTO: 35.9 % (ref 35–47)
HDLC SERPL-MCNC: 63 MG/DL
HDLC SERPL: 2.8 (ref 0–5)
HGB BLD-MCNC: 11.3 G/DL (ref 11.5–16)
IMM GRANULOCYTES # BLD AUTO: 0 K/UL (ref 0–0.04)
IMM GRANULOCYTES NFR BLD AUTO: 0 % (ref 0–0.5)
IRON SATN MFR SERPL: 17 % (ref 20–50)
IRON SERPL-MCNC: 51 UG/DL (ref 35–150)
LDLC SERPL CALC-MCNC: 99.6 MG/DL (ref 0–100)
LYMPHOCYTES # BLD: 1.7 K/UL (ref 0.8–3.5)
LYMPHOCYTES NFR BLD: 21 % (ref 12–49)
MCH RBC QN AUTO: 30.7 PG (ref 26–34)
MCHC RBC AUTO-ENTMCNC: 31.5 G/DL (ref 30–36.5)
MCV RBC AUTO: 97.6 FL (ref 80–99)
MONOCYTES # BLD: 0.8 K/UL (ref 0–1)
MONOCYTES NFR BLD: 10 % (ref 5–13)
NEUTS SEG # BLD: 5.4 K/UL (ref 1.8–8)
NEUTS SEG NFR BLD: 67 % (ref 32–75)
NRBC # BLD: 0 K/UL (ref 0–0.01)
NRBC BLD-RTO: 0 PER 100 WBC
PLATELET # BLD AUTO: 158 K/UL (ref 150–400)
PMV BLD AUTO: 11.3 FL (ref 8.9–12.9)
POTASSIUM SERPL-SCNC: 4.2 MMOL/L (ref 3.5–5.1)
PROT SERPL-MCNC: 7.1 G/DL (ref 6.4–8.2)
RBC # BLD AUTO: 3.68 M/UL (ref 3.8–5.2)
SODIUM SERPL-SCNC: 139 MMOL/L (ref 136–145)
T4 FREE SERPL-MCNC: 1.3 NG/DL (ref 0.8–1.5)
TIBC SERPL-MCNC: 294 UG/DL (ref 250–450)
TRIGL SERPL-MCNC: 82 MG/DL
TSH SERPL DL<=0.05 MIU/L-ACNC: 1.85 UIU/ML (ref 0.36–3.74)
VIT B12 SERPL-MCNC: 969 PG/ML (ref 193–986)
VLDLC SERPL CALC-MCNC: 16.4 MG/DL
WBC # BLD AUTO: 8.2 K/UL (ref 3.6–11)

## 2024-08-06 ENCOUNTER — CLINICAL DOCUMENTATION (OUTPATIENT)
Age: 89
End: 2024-08-06

## 2024-08-06 NOTE — PROGRESS NOTES
Pt daughter, Nisa came in the office dropped off 1pg bp readings from August 3 - August 6    Copy made and given back to Nisa    Date stamped and placed in Dr Correa's box

## 2024-08-20 ENCOUNTER — NURSE ONLY (OUTPATIENT)
Age: 89
End: 2024-08-20
Payer: MEDICARE

## 2024-08-20 DIAGNOSIS — Z79.01 LONG TERM (CURRENT) USE OF ANTICOAGULANTS: Primary | ICD-10-CM

## 2024-08-20 LAB
POC INR: 1.6
PROTHROMBIN TIME, POC: 19.3

## 2024-08-20 PROCEDURE — 85610 PROTHROMBIN TIME: CPT | Performed by: FAMILY MEDICINE

## 2024-08-20 PROCEDURE — PBSHW AMB POC PT/INR: Performed by: FAMILY MEDICINE

## 2024-08-20 NOTE — PROGRESS NOTES
Sobeida Fried is a 89 y.o. female who presents today for Anticoagulation monitoring.    Indication: atrial fibrillation  INR Goal: 2.0-3.0.  Current dose:  Coumadin 2.5mg Mon Wed and Fri and 5 mg all other days  Missed Coumadin Doses:  None  Medication Changes:  no  Dietary Changes:  no    Symptoms: taking coumadin appropriately without any bleeding.    Latest INRs:  Lab Results   Component Value Date/Time    INR 3.9 08/02/2024 08:47 AM    INR 3.7 07/25/2024 10:19 AM    INR 2.6 06/17/2024 09:59 AM    INR 1.8 08/25/2022 09:59 AM    INR 2.2 07/21/2022 01:35 PM    INR 2.5 06/09/2022 10:37 AM        New Coumadin dose:. 2.5 mg every Wed, Fri; 5 mg all other days     Next check to be scheduled for  2 weeks.

## 2024-08-29 ENCOUNTER — NURSE ONLY (OUTPATIENT)
Age: 89
End: 2024-08-29
Payer: MEDICARE

## 2024-08-29 DIAGNOSIS — Z79.01 LONG TERM (CURRENT) USE OF ANTICOAGULANTS: Primary | ICD-10-CM

## 2024-08-29 LAB
POC INR: 1.7
PROTHROMBIN TIME, POC: 20.2

## 2024-08-29 PROCEDURE — 85610 PROTHROMBIN TIME: CPT | Performed by: FAMILY MEDICINE

## 2024-08-29 PROCEDURE — PBSHW AMB POC PT/INR: Performed by: FAMILY MEDICINE

## 2024-08-29 NOTE — PROGRESS NOTES
Sobeida Fried is a 89 y.o. female who presents today for Anticoagulation monitoring.    Indication: atrial fibrillation  INR Goal: 2.0-3.0.  Current dose:   2.5 mg every Wed, Fri; 5 mg all other days.  Missed Coumadin Doses:  None  Medication Changes:  no  Dietary Changes:  no    Symptoms: taking coumadin appropriately without any bleeding.    Latest INRs:  Lab Results   Component Value Date/Time    INR 1.6 08/20/2024 10:04 AM    INR 3.9 08/02/2024 08:47 AM    INR 3.7 07/25/2024 10:19 AM    INR 1.8 08/25/2022 09:59 AM    INR 2.2 07/21/2022 01:35 PM    INR 2.5 06/09/2022 10:37 AM

## 2024-09-06 ENCOUNTER — NURSE ONLY (OUTPATIENT)
Age: 89
End: 2024-09-06
Payer: MEDICARE

## 2024-09-06 DIAGNOSIS — Z79.01 LONG TERM (CURRENT) USE OF ANTICOAGULANTS: Primary | ICD-10-CM

## 2024-09-06 LAB
POC INR: 1.9
PROTHROMBIN TIME, POC: 22.6

## 2024-09-06 PROCEDURE — 85610 PROTHROMBIN TIME: CPT | Performed by: FAMILY MEDICINE

## 2024-09-06 PROCEDURE — PBSHW AMB POC PT/INR: Performed by: FAMILY MEDICINE

## 2024-09-06 NOTE — PROGRESS NOTES
Sobeida Fried is a 89 y.o. female who presents today for Anticoagulation monitoring.    Indication: atrial fibrillation  INR Goal: 2.0-3.0.  Current dose:  Coumadin 2.5mg on wed, 5mg all other days   Missed Coumadin Doses:  None  Medication Changes:  no  Dietary Changes:  no    Symptoms: taking coumadin appropriately without any bleeding.    Latest INRs:  Lab Results   Component Value Date/Time    INR 1.7 08/29/2024 03:30 PM    INR 1.6 08/20/2024 10:04 AM    INR 3.9 08/02/2024 08:47 AM    INR 1.8 08/25/2022 09:59 AM    INR 2.2 07/21/2022 01:35 PM    INR 2.5 06/09/2022 10:37 AM        New Coumadin dose: 5mg everyday  Next check to be scheduled for  2 weeks.

## 2024-09-20 ENCOUNTER — NURSE ONLY (OUTPATIENT)
Age: 89
End: 2024-09-20
Payer: MEDICARE

## 2024-09-20 DIAGNOSIS — Z79.01 LONG TERM (CURRENT) USE OF ANTICOAGULANTS: Primary | ICD-10-CM

## 2024-09-20 DIAGNOSIS — Z23 NEEDS FLU SHOT: ICD-10-CM

## 2024-09-20 LAB
POC INR: 2.2
PROTHROMBIN TIME, POC: 26

## 2024-09-20 PROCEDURE — PBSHW AMB POC PT/INR: Performed by: FAMILY MEDICINE

## 2024-09-20 PROCEDURE — 85610 PROTHROMBIN TIME: CPT | Performed by: FAMILY MEDICINE

## 2024-09-20 PROCEDURE — PBSHW INFLUENZA, FLUAD TRIVALENT, (AGE 65 Y+), IM, PRESERVATIVE FREE, 0.5ML: Performed by: FAMILY MEDICINE

## 2024-09-20 PROCEDURE — 90653 IIV ADJUVANT VACCINE IM: CPT | Performed by: FAMILY MEDICINE

## 2024-09-20 RX ORDER — WARFARIN SODIUM 5 MG/1
10 TABLET ORAL DAILY
Qty: 180 TABLET | Refills: 3 | Status: SHIPPED | OUTPATIENT
Start: 2024-09-20

## 2024-10-04 ENCOUNTER — NURSE ONLY (OUTPATIENT)
Age: 89
End: 2024-10-04
Payer: MEDICARE

## 2024-10-04 DIAGNOSIS — Z79.01 LONG TERM (CURRENT) USE OF ANTICOAGULANTS: Primary | ICD-10-CM

## 2024-10-04 LAB
POC INR: 2.4
PROTHROMBIN TIME, POC: 28.3

## 2024-10-04 PROCEDURE — PBSHW AMB POC PT/INR: Performed by: FAMILY MEDICINE

## 2024-10-04 PROCEDURE — 85610 PROTHROMBIN TIME: CPT | Performed by: FAMILY MEDICINE

## 2024-10-04 NOTE — PROGRESS NOTES
Sobeida Fried is a 89 y.o. female who presents today for Anticoagulation monitoring.    Indication: atrial fibrillation  INR Goal: 2.0-3.0.  Current dose:  Coumadin 5mg daily.  Missed Coumadin Doses:  None  Medication Changes:  no  Dietary Changes:  no    Symptoms: taking coumadin appropriately without any bleeding.    Latest INRs:  Lab Results   Component Value Date/Time    INR 2.2 09/20/2024 09:57 AM    INR 1.9 09/06/2024 10:15 AM    INR 1.7 08/29/2024 03:30 PM    INR 1.8 08/25/2022 09:59 AM    INR 2.2 07/21/2022 01:35 PM    INR 2.5 06/09/2022 10:37 AM        New Coumadin dose:.current treatment plan is effective, no change in therapy.    Next check to be scheduled for  4 weeks.

## 2024-10-09 RX ORDER — LOSARTAN POTASSIUM 25 MG/1
12.5 TABLET ORAL DAILY
Qty: 45 TABLET | Refills: 0 | Status: SHIPPED | OUTPATIENT
Start: 2024-10-09 | End: 2024-10-10

## 2024-10-09 NOTE — TELEPHONE ENCOUNTER
Daughter is calling wanting to know if this can be done today before she goes out of town. It's for Losartan Potassium

## 2024-10-10 ENCOUNTER — TELEPHONE (OUTPATIENT)
Age: 89
End: 2024-10-10

## 2024-10-10 RX ORDER — LOSARTAN POTASSIUM 25 MG/1
12.5 TABLET ORAL DAILY
Qty: 45 TABLET | Refills: 0 | Status: SHIPPED | OUTPATIENT
Start: 2024-10-10

## 2024-10-10 NOTE — TELEPHONE ENCOUNTER
Pt daughter called stating pharmacy did not have patients losartan medication. Called and spoke with pharmacy, they have prescription and are getting it ready for . Daughter stated she would  this afternoon.

## 2024-10-18 RX ORDER — LEVOTHYROXINE SODIUM 50 UG/1
50 TABLET ORAL
Qty: 90 TABLET | Refills: 1 | Status: SHIPPED | OUTPATIENT
Start: 2024-10-18

## 2024-11-05 ENCOUNTER — NURSE ONLY (OUTPATIENT)
Age: 89
End: 2024-11-05
Payer: MEDICARE

## 2024-11-05 DIAGNOSIS — Z79.01 LONG TERM (CURRENT) USE OF ANTICOAGULANTS: Primary | ICD-10-CM

## 2024-11-05 LAB
POC INR: 2.4
PROTHROMBIN TIME, POC: 29.2

## 2024-11-05 PROCEDURE — 85610 PROTHROMBIN TIME: CPT | Performed by: FAMILY MEDICINE

## 2024-11-05 PROCEDURE — PBSHW AMB POC PT/INR: Performed by: FAMILY MEDICINE

## 2024-12-04 ENCOUNTER — NURSE ONLY (OUTPATIENT)
Age: 88
End: 2024-12-04
Payer: MEDICARE

## 2024-12-04 DIAGNOSIS — Z79.01 LONG TERM (CURRENT) USE OF ANTICOAGULANTS: Primary | ICD-10-CM

## 2024-12-04 LAB
POC INR: 2.4
PROTHROMBIN TIME, POC: 28.8

## 2024-12-04 PROCEDURE — PBSHW AMB POC PT/INR: Performed by: FAMILY MEDICINE

## 2024-12-04 PROCEDURE — 85610 PROTHROMBIN TIME: CPT | Performed by: FAMILY MEDICINE

## 2024-12-04 NOTE — PROGRESS NOTES
Sobeida Fried is a 89 y.o. female who presents today for Anticoagulation monitoring.    Indication:  long-term anti-coag use  INR Goal: 2.0-3.0.  Current dose:  Coumadin 5 mg daily.  Missed Coumadin Doses:  None  Medication Changes:  no  Dietary Changes:  no    Symptoms: taking coumadin appropriately without any bleeding.    Latest INRs:  Lab Results   Component Value Date/Time    INR 2.4 12/04/2024 10:10 AM    INR 2.4 11/05/2024 10:06 AM    INR 2.4 10/04/2024 10:07 AM    INR 1.8 08/25/2022 09:59 AM    INR 2.2 07/21/2022 01:35 PM    INR 2.5 06/09/2022 10:37 AM        New Coumadin dose:.current treatment plan is effective, no change in therapy.    Next check to be scheduled for  4 weeks.

## 2024-12-06 ENCOUNTER — TELEPHONE (OUTPATIENT)
Age: 88
End: 2024-12-06

## 2024-12-06 RX ORDER — DOXYCYCLINE HYCLATE 100 MG
100 TABLET ORAL 2 TIMES DAILY
Qty: 20 TABLET | Refills: 0 | Status: SHIPPED | OUTPATIENT
Start: 2024-12-06 | End: 2024-12-16

## 2024-12-06 NOTE — TELEPHONE ENCOUNTER
Doxycycline sent to [facility].    If feeling short of breath or develops a fever do not hesitate to go to the emergency room

## 2024-12-06 NOTE — TELEPHONE ENCOUNTER
Daughter is requesting an antibiotic.  She and her  will be out of town.    Symptoms:  chest tightness, no cough, stuffy nose. Denies fever.

## 2024-12-06 NOTE — TELEPHONE ENCOUNTER
Pt daughter, Anya is calling to get an antibiotic called in.    Pt is having a harsh cough that has settled in her chest. Denies any other symptoms.    [facility]

## 2024-12-06 NOTE — TELEPHONE ENCOUNTER
Pt's daughter verbalized understanding and wanted me to tell Dr Wilson \"thank you so much\" She is in [place] on her way to meet her new grand baby.

## 2024-12-31 ENCOUNTER — TELEPHONE (OUTPATIENT)
Age: 88
End: 2024-12-31

## 2024-12-31 NOTE — ANESTHESIA PREPROCEDURE EVALUATION
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

## 2025-01-02 ENCOUNTER — TELEPHONE (OUTPATIENT)
Age: 89
End: 2025-01-02

## 2025-01-02 RX ORDER — LOSARTAN POTASSIUM 25 MG/1
12.5 TABLET ORAL DAILY
Qty: 45 TABLET | Refills: 1 | Status: SHIPPED | OUTPATIENT
Start: 2025-01-02

## 2025-01-02 NOTE — TELEPHONE ENCOUNTER
Pt daughter called again regarding pt's medication and wanted to let us know pt is now completely out of medication.    Previous note:  \"Patient's daughter requesting refill on Losartan 25 MG.  [phone].\"

## 2025-01-09 ENCOUNTER — NURSE ONLY (OUTPATIENT)
Age: 89
End: 2025-01-09
Payer: MEDICARE

## 2025-01-09 DIAGNOSIS — Z79.01 LONG TERM (CURRENT) USE OF ANTICOAGULANTS: Primary | ICD-10-CM

## 2025-01-09 LAB
POC INR: 2.4
PROTHROMBIN TIME, POC: 28.6

## 2025-01-09 PROCEDURE — PBSHW AMB POC PT/INR: Performed by: FAMILY MEDICINE

## 2025-01-09 PROCEDURE — 85610 PROTHROMBIN TIME: CPT | Performed by: FAMILY MEDICINE

## 2025-01-09 NOTE — PROGRESS NOTES
The patient identity was confirmed with  and First/Last Name. Medication and dose reviewed with patient, as well as any new diagnosis/procedures.     Sobeida Fried is a 89 y.o. female who presents today for Anticoagulation monitoring.    Indication:  long-term anti-coag use.  INR Goal: 2.0-3.0.  Current dose:  Coumadin 5mg daily.  Missed Coumadin Doses:  None  Medication Changes:  no  Dietary Changes:  no    Symptoms: taking coumadin appropriately without any bleeding.    Latest INRs:  Lab Results   Component Value Date/Time    INR 2.4 2025 10:59 AM    INR 2.4 2024 10:10 AM    INR 2.4 2024 10:06 AM    INR 1.8 2022 09:59 AM    INR 2.2 2022 01:35 PM    INR 2.5 2022 10:37 AM        New Coumadin dose:.current treatment plan is effective, no change in therapy.    Next check to be scheduled for  4 weeks.

## 2025-02-04 ENCOUNTER — OFFICE VISIT (OUTPATIENT)
Age: 89
End: 2025-02-04
Payer: MEDICARE

## 2025-02-04 VITALS
SYSTOLIC BLOOD PRESSURE: 153 MMHG | OXYGEN SATURATION: 97 % | DIASTOLIC BLOOD PRESSURE: 68 MMHG | BODY MASS INDEX: 28.74 KG/M2 | HEIGHT: 61 IN | TEMPERATURE: 98 F | HEART RATE: 67 BPM | WEIGHT: 152.2 LBS | RESPIRATION RATE: 16 BRPM

## 2025-02-04 DIAGNOSIS — I48.11 LONGSTANDING PERSISTENT ATRIAL FIBRILLATION (HCC): ICD-10-CM

## 2025-02-04 DIAGNOSIS — Z00.00 ROUTINE GENERAL MEDICAL EXAMINATION AT A HEALTH CARE FACILITY: Primary | ICD-10-CM

## 2025-02-04 DIAGNOSIS — Z79.01 LONG TERM (CURRENT) USE OF ANTICOAGULANTS: ICD-10-CM

## 2025-02-04 DIAGNOSIS — E61.1 IRON DEFICIENCY: ICD-10-CM

## 2025-02-04 DIAGNOSIS — E78.5 HYPERLIPIDEMIA, UNSPECIFIED HYPERLIPIDEMIA TYPE: ICD-10-CM

## 2025-02-04 DIAGNOSIS — E55.9 VITAMIN D DEFICIENCY: ICD-10-CM

## 2025-02-04 DIAGNOSIS — I10 PRIMARY HYPERTENSION: ICD-10-CM

## 2025-02-04 DIAGNOSIS — E53.8 B12 DEFICIENCY: ICD-10-CM

## 2025-02-04 DIAGNOSIS — E03.9 HYPOTHYROIDISM, UNSPECIFIED TYPE: ICD-10-CM

## 2025-02-04 LAB
POC INR: 2.3
PROTHROMBIN TIME, POC: 27.3

## 2025-02-04 PROCEDURE — 85610 PROTHROMBIN TIME: CPT | Performed by: FAMILY MEDICINE

## 2025-02-04 PROCEDURE — 1123F ACP DISCUSS/DSCN MKR DOCD: CPT | Performed by: FAMILY MEDICINE

## 2025-02-04 PROCEDURE — G0439 PPPS, SUBSEQ VISIT: HCPCS | Performed by: FAMILY MEDICINE

## 2025-02-04 PROCEDURE — 1159F MED LIST DOCD IN RCRD: CPT | Performed by: FAMILY MEDICINE

## 2025-02-04 PROCEDURE — PBSHW AMB POC PT/INR: Performed by: FAMILY MEDICINE

## 2025-02-04 SDOH — ECONOMIC STABILITY: FOOD INSECURITY: WITHIN THE PAST 12 MONTHS, YOU WORRIED THAT YOUR FOOD WOULD RUN OUT BEFORE YOU GOT MONEY TO BUY MORE.: NEVER TRUE

## 2025-02-04 SDOH — ECONOMIC STABILITY: FOOD INSECURITY: WITHIN THE PAST 12 MONTHS, THE FOOD YOU BOUGHT JUST DIDN'T LAST AND YOU DIDN'T HAVE MONEY TO GET MORE.: NEVER TRUE

## 2025-02-04 ASSESSMENT — PATIENT HEALTH QUESTIONNAIRE - PHQ9
SUM OF ALL RESPONSES TO PHQ QUESTIONS 1-9: 0
SUM OF ALL RESPONSES TO PHQ9 QUESTIONS 1 & 2: 0
SUM OF ALL RESPONSES TO PHQ QUESTIONS 1-9: 0
1. LITTLE INTEREST OR PLEASURE IN DOING THINGS: NOT AT ALL
2. FEELING DOWN, DEPRESSED OR HOPELESS: NOT AT ALL

## 2025-02-04 ASSESSMENT — LIFESTYLE VARIABLES: HOW MANY STANDARD DRINKS CONTAINING ALCOHOL DO YOU HAVE ON A TYPICAL DAY: PATIENT DOES NOT DRINK

## 2025-02-04 NOTE — PROGRESS NOTES
Medicare Annual Wellness Visit    I have reviewed the patient's medical history in detail and updated the computerized patient record.     History   Sobeida Fried is a 89 y.o. female who presents to follow-up on chronic medical issues.      We had a checkup 8/2024 and prior to that I had not seen her since 2019    Recall that 7/2024 she thought she had a UTI but no bacteria grew on culture.  Apparently the concern about UTIs stems from auditory hallucinations.  She started having occasional auditory hallucinations months prior and somebody in the friend group mentioned it was probably because she had a UTI    I see a visit with Natasha 3/2024 complaining of auditory hallucinations x 2 nights.  And was only drinking 1 or 2 cups of water per day.  Recommended an increase in fluid intake    Also mention of auditory hallucinations 2/2024.  Gooding a man singing amazing Bianka while she was saying her prayers    Regarding complaint \"recurrent UTI\" we have 4 cultures so far in 2024 none of which grew bacteria (with the exception of the January 2024 mixed urogenital amanda)    Turns out the auditory hallucinations are always at night, happening on the order of once or twice every couple of weeks.  Sometimes can wake her from sleep.  It is always a man singing hyms.  Apparently he is a good colon but it aggravates her that she cannot tell him to shut up so she will turn on the TV to try to drown them out.      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

## 2025-02-04 NOTE — PROGRESS NOTES
Health Maintenance Due   Topic Date Due    Shingles vaccine (1 of 2) Never done    Respiratory Syncytial Virus (RSV) Pregnant or age 60 yrs+ (1 - 1-dose 75+ series) Never done    COVID-19 Vaccine (1 - 2023-24 season) Never done    DTaP/Tdap/Td vaccine (2 - Td or Tdap) 10/22/2024     Sobeida Fried is a 89 y.o. female who presents today for Anticoagulation monitoring.    Indication:  long-term anti-coag use  INR Goal: 2.0-3.0.  Current dose:  Coumadin 5 mg daily.  Missed Coumadin Doses:  None  Medication Changes:  no  Dietary Changes:  no    Symptoms: taking coumadin appropriately without any bleeding.    Latest INRs:  Lab Results   Component Value Date/Time    INR 2.3 02/04/2025 10:15 AM    INR 2.4 01/09/2025 10:59 AM    INR 2.4 12/04/2024 10:10 AM    INR 1.8 08/25/2022 09:59 AM    INR 2.2 07/21/2022 01:35 PM    INR 2.5 06/09/2022 10:37 AM        New Coumadin dose:.current treatment plan is effective, no change in therapy.    Next check to be scheduled for  4 weeks.

## 2025-02-04 NOTE — PROGRESS NOTES
Health Maintenance Due   Topic Date Due    Shingles vaccine (1 of 2) Never done    Respiratory Syncytial Virus (RSV) Pregnant or age 60 yrs+ (1 - 1-dose 75+ series) Never done    COVID-19 Vaccine (1 - - season) Never done    DTaP/Tdap/Td vaccine (2 - Td or Tdap) 10/22/2024    Annual Wellness Visit (Medicare Advantage)  2025     Sobeida Fried is a 89 y.o. female who presents today for Anticoagulation monitoring.    Indication:  long-term anti-coag use  INR Goal: 2.0-3.0.  Current dose:  Coumadin 5 mg daily.  Missed Coumadin Doses:  {none/this week/over week:87679}  Medication Changes:  {yes***/no:20035}  Dietary Changes:  {yes***/no:79633}    Symptoms: taking coumadin appropriately {findings; bleedin}.    Latest INRs:  Lab Results   Component Value Date/Time    INR 2.4 2025 10:59 AM    INR 2.4 2024 10:10 AM    INR 2.4 2024 10:06 AM    INR 1.8 2022 09:59 AM    INR 2.2 2022 01:35 PM    INR 2.5 2022 10:37 AM        New Coumadin dose:.{disease follow up plans:298706}.    Next check to be scheduled for  {#:02393} weeks.

## 2025-02-05 LAB
25(OH)D3+25(OH)D2 SERPL-MCNC: 34.3 NG/ML (ref 30–100)
ALBUMIN SERPL-MCNC: 4.4 G/DL (ref 3.6–4.6)
ALP SERPL-CCNC: 92 IU/L (ref 44–121)
ALT SERPL-CCNC: 13 IU/L (ref 0–32)
AST SERPL-CCNC: 21 IU/L (ref 0–40)
BASOPHILS # BLD AUTO: 0 X10E3/UL (ref 0–0.2)
BASOPHILS NFR BLD AUTO: 0 %
BILIRUB SERPL-MCNC: 0.4 MG/DL (ref 0–1.2)
BUN SERPL-MCNC: 27 MG/DL (ref 10–36)
BUN/CREAT SERPL: 28 (ref 12–28)
CALCIUM SERPL-MCNC: 10.1 MG/DL (ref 8.7–10.3)
CHLORIDE SERPL-SCNC: 103 MMOL/L (ref 96–106)
CHOLEST SERPL-MCNC: 185 MG/DL (ref 100–199)
CO2 SERPL-SCNC: 20 MMOL/L (ref 20–29)
CREAT SERPL-MCNC: 0.95 MG/DL (ref 0.57–1)
EGFRCR SERPLBLD CKD-EPI 2021: 56 ML/MIN/1.73
EOSINOPHIL # BLD AUTO: 0.1 X10E3/UL (ref 0–0.4)
EOSINOPHIL NFR BLD AUTO: 2 %
ERYTHROCYTE [DISTWIDTH] IN BLOOD BY AUTOMATED COUNT: 12.8 % (ref 11.7–15.4)
FERRITIN SERPL-MCNC: 261 NG/ML (ref 15–150)
FOLATE SERPL-MCNC: 11.7 NG/ML
GLOBULIN SER CALC-MCNC: 2.6 G/DL (ref 1.5–4.5)
GLUCOSE SERPL-MCNC: 96 MG/DL (ref 70–99)
HCT VFR BLD AUTO: 38.2 % (ref 34–46.6)
HDLC SERPL-MCNC: 74 MG/DL
HGB BLD-MCNC: 12.6 G/DL (ref 11.1–15.9)
IMM GRANULOCYTES # BLD AUTO: 0 X10E3/UL (ref 0–0.1)
IMM GRANULOCYTES NFR BLD AUTO: 0 %
IMP & REVIEW OF LAB RESULTS: NORMAL
IRON SATN MFR SERPL: 19 % (ref 15–55)
IRON SERPL-MCNC: 58 UG/DL (ref 27–139)
LDLC SERPL CALC-MCNC: 97 MG/DL (ref 0–99)
LYMPHOCYTES # BLD AUTO: 1.7 X10E3/UL (ref 0.7–3.1)
LYMPHOCYTES NFR BLD AUTO: 28 %
MCH RBC QN AUTO: 31.4 PG (ref 26.6–33)
MCHC RBC AUTO-ENTMCNC: 33 G/DL (ref 31.5–35.7)
MCV RBC AUTO: 95 FL (ref 79–97)
MONOCYTES # BLD AUTO: 0.8 X10E3/UL (ref 0.1–0.9)
MONOCYTES NFR BLD AUTO: 13 %
NEUTROPHILS # BLD AUTO: 3.4 X10E3/UL (ref 1.4–7)
NEUTROPHILS NFR BLD AUTO: 57 %
PLATELET # BLD AUTO: 172 X10E3/UL (ref 150–450)
POTASSIUM SERPL-SCNC: 4.4 MMOL/L (ref 3.5–5.2)
PROT SERPL-MCNC: 7 G/DL (ref 6–8.5)
RBC # BLD AUTO: 4.01 X10E6/UL (ref 3.77–5.28)
REPORT: NORMAL
REPORT: NORMAL
SODIUM SERPL-SCNC: 144 MMOL/L (ref 134–144)
T4 FREE SERPL-MCNC: 1.55 NG/DL (ref 0.82–1.77)
TIBC SERPL-MCNC: 301 UG/DL (ref 250–450)
TRIGL SERPL-MCNC: 77 MG/DL (ref 0–149)
TSH SERPL DL<=0.005 MIU/L-ACNC: 3.41 UIU/ML (ref 0.45–4.5)
UIBC SERPL-MCNC: 243 UG/DL (ref 118–369)
VIT B12 SERPL-MCNC: 400 PG/ML (ref 232–1245)
VLDLC SERPL CALC-MCNC: 14 MG/DL (ref 5–40)
WBC # BLD AUTO: 5.9 X10E3/UL (ref 3.4–10.8)

## 2025-02-17 NOTE — PROGRESS NOTES
Abril Arambula is a 80 y.o. female who presents today for Anticoagulation monitoring. INR Goal: 2.0-3.0. Current dose:  Coumadin 5 mg daily. Missed Coumadin Doses:  None  Medication Changes:  no  Dietary Changes:  no    Symptoms: taking coumadin appropriately NONE. Latest INRs:  Lab Results   Component Value Date/Time    INR 2.2 (H) 06/22/2020 10:31 AM    INR 3.1 (H) 04/23/2014 11:30 AM    INR (POC) 1.4 (H) 02/16/2018 11:40 AM    INR, External 1.9 04/21/2015 12:48 PM    INR POC 2.5 (A) 06/09/2022 10:37 AM    INR POC 2.2 (A) 05/12/2022 10:12 AM    INR POC 2.1 (A) 04/28/2022 10:10 AM    Prothrombin time 22.8 (H) 06/22/2020 10:31 AM    Prothrombin time 31.6 (H) 04/23/2014 11:30 AM        New Coumadin dose:.current treatment plan is effective, no change in therapy. Next check to be scheduled for  4 weeks. Monitor Summary   SR 71-87  Ectopy: Rare PVCs  .15/.10/.36

## 2025-03-04 ENCOUNTER — NURSE ONLY (OUTPATIENT)
Age: 89
End: 2025-03-04
Payer: MEDICARE

## 2025-03-04 DIAGNOSIS — Z79.01 LONG TERM (CURRENT) USE OF ANTICOAGULANTS: Primary | ICD-10-CM

## 2025-03-04 LAB
POC INR: 2.3
PROTHROMBIN TIME, POC: 27.9

## 2025-03-04 PROCEDURE — 85610 PROTHROMBIN TIME: CPT | Performed by: FAMILY MEDICINE

## 2025-03-04 PROCEDURE — PBSHW AMB POC PT/INR: Performed by: FAMILY MEDICINE

## 2025-03-04 NOTE — PROGRESS NOTES
The patient identity was confirmed with  and First/Last Name. Medication and dose reviewed with patient, as well as any new diagnosis/procedures.     Sobeida Fried is a 89 y.o. female who presents today for Anticoagulation monitoring.    Indication:  long-term anti-coag use.  INR Goal: 2.0-3.0.  Current dose:  Coumadin 5mg daily.  Missed Coumadin Doses:  None  Medication Changes:  no  Dietary Changes:  no    Symptoms: taking coumadin appropriately without any bleeding.    Latest INRs:  Lab Results   Component Value Date/Time    INR 2.3 2025 02:10 PM    INR 2.3 2025 10:15 AM    INR 2.4 2025 10:59 AM    INR 1.8 2022 09:59 AM    INR 2.2 2022 01:35 PM    INR 2.5 2022 10:37 AM        New Coumadin dose:.current treatment plan is effective, no change in therapy.    Next check to be scheduled for  4 weeks.

## 2025-04-01 ENCOUNTER — CLINICAL SUPPORT (OUTPATIENT)
Age: 89
End: 2025-04-01
Payer: MEDICARE

## 2025-04-01 DIAGNOSIS — I48.11 LONGSTANDING PERSISTENT ATRIAL FIBRILLATION (HCC): Primary | ICD-10-CM

## 2025-04-01 LAB
POC INR: 2
PROTHROMBIN TIME, POC: 23.8

## 2025-04-01 PROCEDURE — 85610 PROTHROMBIN TIME: CPT | Performed by: FAMILY MEDICINE

## 2025-04-01 PROCEDURE — PBSHW AMB POC PT/INR: Performed by: FAMILY MEDICINE

## 2025-04-01 NOTE — PROGRESS NOTES
Sobeida Fried is a 89 y.o. female who presents today for Anticoagulation monitoring.    Indication: atrial fibrillation  INR Goal: 2.0-3.0.  Current dose:  Coumadin 5 mg daily.  Missed Coumadin Doses:  None  Medication Changes:  no  Dietary Changes:  no    Symptoms: taking coumadin appropriately without any bleeding.    Latest INRs:  Lab Results   Component Value Date/Time    INR 2.3 03/04/2025 02:10 PM    INR 2.3 02/04/2025 10:15 AM    INR 2.4 01/09/2025 10:59 AM    INR 1.8 08/25/2022 09:59 AM    INR 2.2 07/21/2022 01:35 PM    INR 2.5 06/09/2022 10:37 AM        New Coumadin dose:.current treatment plan is effective, no change in therapy.    Next check to be scheduled for  4 weeks.

## 2025-04-07 ENCOUNTER — OFFICE VISIT (OUTPATIENT)
Age: 89
End: 2025-04-07
Payer: MEDICARE

## 2025-04-07 VITALS
HEIGHT: 61 IN | DIASTOLIC BLOOD PRESSURE: 67 MMHG | HEART RATE: 62 BPM | SYSTOLIC BLOOD PRESSURE: 164 MMHG | RESPIRATION RATE: 16 BRPM | OXYGEN SATURATION: 96 % | BODY MASS INDEX: 28.21 KG/M2 | TEMPERATURE: 97.7 F | WEIGHT: 149.4 LBS

## 2025-04-07 DIAGNOSIS — R53.83 OTHER FATIGUE: Primary | ICD-10-CM

## 2025-04-07 DIAGNOSIS — I10 PRIMARY HYPERTENSION: ICD-10-CM

## 2025-04-07 DIAGNOSIS — I48.11 LONGSTANDING PERSISTENT ATRIAL FIBRILLATION (HCC): ICD-10-CM

## 2025-04-07 LAB
BILIRUBIN, URINE, POC: NEGATIVE
BLOOD URINE, POC: NEGATIVE
GLUCOSE URINE, POC: NEGATIVE
KETONES, URINE, POC: NEGATIVE
LEUKOCYTE ESTERASE, URINE, POC: NEGATIVE
NITRITE, URINE, POC: NEGATIVE
PH, URINE, POC: 5 (ref 4.6–8)
POC INR: 1.7
PROTEIN,URINE, POC: NEGATIVE
PROTHROMBIN TIME, POC: 20.9
SPECIFIC GRAVITY, URINE, POC: 1.02 (ref 1–1.03)
URINALYSIS CLARITY, POC: CLEAR
URINALYSIS COLOR, POC: YELLOW
UROBILINOGEN, POC: ABNORMAL

## 2025-04-07 PROCEDURE — PBSHW AMB POC URINALYSIS DIP STICK AUTO W/O MICRO: Performed by: FAMILY MEDICINE

## 2025-04-07 PROCEDURE — 85610 PROTHROMBIN TIME: CPT | Performed by: FAMILY MEDICINE

## 2025-04-07 PROCEDURE — 1126F AMNT PAIN NOTED NONE PRSNT: CPT | Performed by: FAMILY MEDICINE

## 2025-04-07 PROCEDURE — PBSHW AMB POC PT/INR: Performed by: FAMILY MEDICINE

## 2025-04-07 PROCEDURE — 99214 OFFICE O/P EST MOD 30 MIN: CPT | Performed by: FAMILY MEDICINE

## 2025-04-07 PROCEDURE — 1159F MED LIST DOCD IN RCRD: CPT | Performed by: FAMILY MEDICINE

## 2025-04-07 PROCEDURE — 1123F ACP DISCUSS/DSCN MKR DOCD: CPT | Performed by: FAMILY MEDICINE

## 2025-04-07 PROCEDURE — 81003 URINALYSIS AUTO W/O SCOPE: CPT | Performed by: FAMILY MEDICINE

## 2025-04-07 NOTE — PROGRESS NOTES
Health Maintenance Due   Topic Date Due    Shingles vaccine (1 of 2) Never done    Respiratory Syncytial Virus (RSV) Pregnant or age 60 yrs+ (1 - 1-dose 75+ series) Never done    COVID-19 Vaccine (1 - 2024-25 season) Never done    DTaP/Tdap/Td vaccine (2 - Td or Tdap) 10/22/2024       
doxazosin (CARDURA) 8 MG tablet TAKE 1/2 TABLET BY MOUTH DAILY      warfarin (COUMADIN) 1 MG tablet Take by mouth daily       No current facility-administered medications for this visit.       Allergies:   Allergies   Allergen Reactions    Nitrofurantoin Rash       Smoker:  Social History     Tobacco Use   Smoking Status Never   Smokeless Tobacco Never       ETOH:   Social History     Substance and Sexual Activity   Alcohol Use No       FH:   Family History   Problem Relation Age of Onset    Cancer Mother        ROS:   As listed in HPI. In addition:  Constitutional:   No headache, fever, fatigue, weight loss or weight gain      Cardiac:    No chest pain      Resp:   No cough or shortness of breath      Neuro   No loss of consciousness, dizziness, seizures      Physical Exam:  Blood pressure (!) 164/67, pulse 62, temperature 97.7 °F (36.5 °C), temperature source Temporal, resp. rate 16, height 1.549 m (5' 1\"), weight 67.8 kg (149 lb 6.4 oz), SpO2 96%.  GEN: No apparent distress. Alert and oriented and responds to all questions appropriately.   NEUROLOGIC:  No focal neurologic deficits. Strength and sensation grossly intact.  Coordination and gait grossly intact.   EXT: Well perfused. No edema.  SKIN: No obvious rashes.         Assessment and Plan     Fatigue  Chronic A-fib  History of pulmonary hypertension  Is not defending more than 7 hours for sleep and she is waking up once or twice to urinate  Moved liquid intake earlier in the evening  Take your evening meds with dinner instead of bedtime  Go to bed an hour earlier around 10-11 if you are waking up time is always 7:00  Avoid caffeine in the afternoons    Pay attention to barriers to sleep.  Bodily discomforts etc.    Mentioned trouble bringing her legs rest.  Consider the possibility of RLS  Urinating once or twice at night could be considered normal.  More than that could be caffeine or undiagnosed ARLIN    Would caution against adding medicine unless there is

## 2025-04-17 ENCOUNTER — TELEPHONE (OUTPATIENT)
Age: 89
End: 2025-04-17

## 2025-04-17 NOTE — TELEPHONE ENCOUNTER
Patient was scheduled for inr (4/21/25), can't come to [facility]. Please advise on when we can schedule her inr.  [phone]

## 2025-04-21 RX ORDER — LEVOTHYROXINE SODIUM 50 UG/1
50 TABLET ORAL
Qty: 90 TABLET | Refills: 1 | Status: SHIPPED | OUTPATIENT
Start: 2025-04-21

## 2025-04-23 ENCOUNTER — ANTI-COAG VISIT (OUTPATIENT)
Age: 89
End: 2025-04-23

## 2025-04-23 ENCOUNTER — TELEPHONE (OUTPATIENT)
Age: 89
End: 2025-04-23

## 2025-04-23 ENCOUNTER — CLINICAL SUPPORT (OUTPATIENT)
Age: 89
End: 2025-04-23

## 2025-04-23 DIAGNOSIS — I48.11 LONGSTANDING PERSISTENT ATRIAL FIBRILLATION (HCC): ICD-10-CM

## 2025-04-23 DIAGNOSIS — I48.11 LONGSTANDING PERSISTENT ATRIAL FIBRILLATION (HCC): Primary | ICD-10-CM

## 2025-04-23 NOTE — TELEPHONE ENCOUNTER
Pt daughter, Nisa is requesting a call back by Friday 04/25 to discuss INR results.    Nisa will be going out of town

## 2025-04-23 NOTE — PROGRESS NOTES
Patient presents to the office for POC INR, however current office in [place] does not offer point-of-care testing and only has venipuncture lab testing available.  Lab order placed.

## 2025-04-24 ENCOUNTER — OFFICE VISIT (OUTPATIENT)
Age: 89
End: 2025-04-24
Payer: MEDICARE

## 2025-04-24 ENCOUNTER — RESULTS FOLLOW-UP (OUTPATIENT)
Age: 89
End: 2025-04-24

## 2025-04-24 VITALS
RESPIRATION RATE: 18 BRPM | HEART RATE: 68 BPM | HEIGHT: 61 IN | SYSTOLIC BLOOD PRESSURE: 205 MMHG | DIASTOLIC BLOOD PRESSURE: 69 MMHG | BODY MASS INDEX: 28.13 KG/M2 | TEMPERATURE: 97.8 F | OXYGEN SATURATION: 97 % | WEIGHT: 149 LBS

## 2025-04-24 DIAGNOSIS — R35.0 FREQUENT URINATION: Primary | ICD-10-CM

## 2025-04-24 DIAGNOSIS — R35.0 FREQUENT URINATION: ICD-10-CM

## 2025-04-24 LAB
INR PPP: 1.9 (ref 0.9–1.2)
PROTHROMBIN TIME: 20.5 SEC (ref 9.1–12)

## 2025-04-24 PROCEDURE — 1123F ACP DISCUSS/DSCN MKR DOCD: CPT | Performed by: NURSE PRACTITIONER

## 2025-04-24 PROCEDURE — 1159F MED LIST DOCD IN RCRD: CPT | Performed by: NURSE PRACTITIONER

## 2025-04-24 PROCEDURE — 99213 OFFICE O/P EST LOW 20 MIN: CPT | Performed by: NURSE PRACTITIONER

## 2025-04-24 PROCEDURE — 1160F RVW MEDS BY RX/DR IN RCRD: CPT | Performed by: NURSE PRACTITIONER

## 2025-04-24 PROCEDURE — 1126F AMNT PAIN NOTED NONE PRSNT: CPT | Performed by: NURSE PRACTITIONER

## 2025-04-24 ASSESSMENT — ENCOUNTER SYMPTOMS
VOMITING: 0
COUGH: 0
NAUSEA: 0
ABDOMINAL PAIN: 0
BACK PAIN: 0

## 2025-04-24 NOTE — PROGRESS NOTES
Sobeida Fried (:  10/29/1931) is a 89 y.o. female,Established patient, here for evaluation of the following chief complaint(s):         1. Frequent urination  Comments:  acute  U/A sent  keflex as well as preliminary  Orders:  -     Urinalysis with Reflex to Culture; Future  -     cephALEXin (KEFLEX) 250 MG capsule; Take 1 capsule by mouth 2 times daily for 5 days, Disp-10 capsule, R-0Normal       No follow-ups on file.       Subjective     HPI    HTN  Uncontrolled  Hasn't taken bp meds this morning, yet  Been up urinating all night  Reporting white coat syndrome  Daughter is a nurse will assess bp at home    Afib  Always in Afib  INR due in 2 weeks    UTI s/s  Started around 4 AM  No painful or burning urination, states never had burning in the past  No back pain  No n/v  Lower abd pain (pelvic) and then started peeing every 30 min  Appears h/o s/s with UTI but not necessarily UTIs  Sent Keflex will call pt with results    No s/s of acute distress. Historically pt follows with Dr. Wilson.      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

## 2025-04-24 NOTE — PROGRESS NOTES
Chief Complaint   Patient presents with    Urinary Tract Infection         Health Maintenance Due   Topic Date Due    Shingles vaccine (1 of 2) Never done    Respiratory Syncytial Virus (RSV) Pregnant or age 60 yrs+ (1 - 1-dose 75+ series) Never done    COVID-19 Vaccine (1 - 2024-25 season) Never done    DTaP/Tdap/Td vaccine (2 - Td or Tdap) 10/22/2024         \"Have you been to the ER, urgent care clinic since your last visit?  Hospitalized since your last visit?\"    NO    “Have you seen or consulted any other health care providers outside of [facility] since your last visit?”    NO

## 2025-04-25 ENCOUNTER — RESULTS FOLLOW-UP (OUTPATIENT)
Age: 89
End: 2025-04-25

## 2025-04-25 LAB
APPEARANCE UR: CLEAR
BACTERIA #/AREA URNS HPF: NORMAL /[HPF]
BILIRUB UR QL STRIP: NEGATIVE
CASTS URNS QL MICRO: NORMAL /LPF
COLOR UR: YELLOW
EPI CELLS #/AREA URNS HPF: NORMAL /HPF (ref 0–10)
GLUCOSE UR QL STRIP: NEGATIVE
HGB UR QL STRIP: NEGATIVE
KETONES UR QL STRIP: NEGATIVE
LEUKOCYTE ESTERASE UR QL STRIP: NEGATIVE
MICRO URNS: NORMAL
MICRO URNS: NORMAL
NITRITE UR QL STRIP: NEGATIVE
PH UR STRIP: 5.5 [PH] (ref 5–7.5)
PROT UR QL STRIP: NORMAL
RBC #/AREA URNS HPF: NORMAL /HPF (ref 0–2)
SP GR UR STRIP: 1.02 (ref 1–1.03)
URINALYSIS REFLEX: NORMAL
UROBILINOGEN UR STRIP-MCNC: 0.2 MG/DL (ref 0.2–1)
WBC #/AREA URNS HPF: NORMAL /HPF (ref 0–5)

## 2025-04-25 NOTE — RESULT ENCOUNTER NOTE
Please reach out to the patient's daughter and let her know that the urine analysis is clear and that there are no indications for a urinary tract infection.

## 2025-05-07 ENCOUNTER — CLINICAL SUPPORT (OUTPATIENT)
Age: 89
End: 2025-05-07
Payer: MEDICARE

## 2025-05-07 DIAGNOSIS — Z79.01 LONG TERM (CURRENT) USE OF ANTICOAGULANTS: Primary | ICD-10-CM

## 2025-05-07 LAB
POC INR: 2.7
PROTHROMBIN TIME, POC: 32.5

## 2025-05-07 PROCEDURE — PBSHW AMB POC PT/INR: Performed by: FAMILY MEDICINE

## 2025-05-07 PROCEDURE — 85610 PROTHROMBIN TIME: CPT | Performed by: FAMILY MEDICINE

## 2025-05-07 NOTE — PROGRESS NOTES
The patient identity was confirmed with  and First/Last Name. Medication and dose reviewed with patient, as well as any new diagnosis/procedures.     Sobeida Fried is a 89 y.o. female who presents today for Anticoagulation monitoring.    Indication:  long-term anti-coag use  INR Goal: 2.0-3.0.  Current dose:  Coumadin Mon-Sat 5mg,  7.5mg daily.  Missed Coumadin Doses:  None  Medication Changes:  no  Dietary Changes:  no    Symptoms: taking coumadin appropriately without any bleeding.    Latest INRs:  Lab Results   Component Value Date/Time    INR 2.7 2025 10:29 AM    INR 1.9 2025 12:00 AM    INR 1.7 2025 11:46 AM    INR 2.0 2025 09:57 AM    INR 1.8 2022 09:59 AM    INR 2.2 2022 01:35 PM    INR 2.5 2022 10:37 AM        New Coumadin dose:.current treatment plan is effective, no change in therapy, the following changes are made -none .    Next check to be scheduled for  2 weeks.

## 2025-05-14 RX ORDER — HYDROCHLOROTHIAZIDE 25 MG/1
25 TABLET ORAL DAILY
Qty: 90 TABLET | Refills: 3 | Status: SHIPPED | OUTPATIENT
Start: 2025-05-14

## 2025-05-19 ENCOUNTER — TELEPHONE (OUTPATIENT)
Age: 89
End: 2025-05-19

## 2025-05-19 ENCOUNTER — CLINICAL SUPPORT (OUTPATIENT)
Age: 89
End: 2025-05-19
Payer: MEDICARE

## 2025-05-19 DIAGNOSIS — Z79.01 LONG TERM (CURRENT) USE OF ANTICOAGULANTS: Primary | ICD-10-CM

## 2025-05-19 LAB
POC INR: 2.4
PROTHROMBIN TIME, POC: 36.1

## 2025-05-19 PROCEDURE — 85610 PROTHROMBIN TIME: CPT | Performed by: FAMILY MEDICINE

## 2025-05-19 PROCEDURE — PBSHW AMB POC PT/INR: Performed by: FAMILY MEDICINE

## 2025-05-19 NOTE — TELEPHONE ENCOUNTER
Pt daughter, Nisa came into the office to clarify her rx  warfarin (COUMADIN) 5 MG tablet     Should the pt be taking 1 tablet everyday and an extra 1/2 tablets on Sundays?    Please call to discuss

## 2025-05-19 NOTE — PROGRESS NOTES
The patient identity was confirmed with  and First/Last Name. Medication and dose reviewed with patient, as well as any new diagnosis/procedures.     Sobeida Fried is a 89 y.o. female who presents today for Anticoagulation monitoring.    Indication:  long-term anti-coag use  INR Goal: 2.0-3.0.  Current dose:  Coumadin Mon-Sat 5mg,  7.5mg daily.  Missed Coumadin Doses:  None  Medication Changes:  no  Dietary Changes:  no    Symptoms: taking coumadin appropriately without any bleeding.    Latest INRs:  Lab Results   Component Value Date/Time    INR 2.7 2025 10:29 AM    INR 1.9 2025 12:00 AM    INR 1.7 2025 11:46 AM    INR 2.0 2025 09:57 AM    INR 1.8 2022 09:59 AM    INR 2.2 2022 01:35 PM    INR 2.5 2022 10:37 AM        New Coumadin dose:.current treatment plan is effective, no change in therapy, the following changes are made - none.    Next check to be scheduled for  4 weeks.

## 2025-06-18 ENCOUNTER — CLINICAL SUPPORT (OUTPATIENT)
Age: 89
End: 2025-06-18
Payer: MEDICARE

## 2025-06-18 ENCOUNTER — TELEPHONE (OUTPATIENT)
Age: 89
End: 2025-06-18

## 2025-06-18 DIAGNOSIS — Z79.01 LONG TERM (CURRENT) USE OF ANTICOAGULANTS: Primary | ICD-10-CM

## 2025-06-18 LAB
POC INR: 3.1
PROTHROMBIN TIME, POC: 37.1

## 2025-06-18 PROCEDURE — 85610 PROTHROMBIN TIME: CPT | Performed by: FAMILY MEDICINE

## 2025-06-18 PROCEDURE — PBSHW AMB POC PT/INR: Performed by: FAMILY MEDICINE

## 2025-06-18 RX ORDER — LOSARTAN POTASSIUM 25 MG/1
12.5 TABLET ORAL DAILY
Qty: 45 TABLET | Refills: 1 | Status: SHIPPED | OUTPATIENT
Start: 2025-06-18

## 2025-06-18 RX ORDER — AMOXICILLIN 500 MG/1
2000 CAPSULE ORAL ONCE
Qty: 4 CAPSULE | Refills: 0 | Status: SHIPPED | OUTPATIENT
Start: 2025-06-18 | End: 2025-06-18

## 2025-06-18 NOTE — PROGRESS NOTES
Sobeida Fried is a 89 y.o. female who presents today for Anticoagulation monitoring.    Indication: atrial fibrillation  INR Goal: 2.0-3.0.  Current dose:  Coumadin 5 mg daily, except 7.5 mg Sunday.  Missed Coumadin Doses:  None  Medication Changes:  no  Dietary Changes:  no    Symptoms: taking coumadin appropriately without any bleeding.    Latest INRs:  Lab Results   Component Value Date/Time    INR 2.4 05/19/2025 10:40 AM    INR 2.7 05/07/2025 10:29 AM    INR 1.9 04/23/2025 12:00 AM    INR 1.7 04/07/2025 11:46 AM    INR 1.8 08/25/2022 09:59 AM    INR 2.2 07/21/2022 01:35 PM    INR 2.5 06/09/2022 10:37 AM        New Coumadin dose:.current treatment plan is effective, no change in therapy.    Next check to be scheduled for  2 weeks.    Pt notified via phone.  Appt scheduled.  Understanding vocalized.

## 2025-06-18 NOTE — TELEPHONE ENCOUNTER
Pt is needing a refill on med and daughter wants to get when she goes to the dentist      losartan (COZAAR) 25 MG tablet

## 2025-07-02 ENCOUNTER — CLINICAL SUPPORT (OUTPATIENT)
Age: 89
End: 2025-07-02
Payer: MEDICARE

## 2025-07-02 DIAGNOSIS — I48.11 LONGSTANDING PERSISTENT ATRIAL FIBRILLATION (HCC): ICD-10-CM

## 2025-07-02 DIAGNOSIS — I87.2 VENOUS INSUFFICIENCY: ICD-10-CM

## 2025-07-02 DIAGNOSIS — R60.0 LEG EDEMA: ICD-10-CM
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
## 2025-07-02 DIAGNOSIS — Z79.01 LONG TERM (CURRENT) USE OF ANTICOAGULANTS: Primary | ICD-10-CM

## 2025-07-02 LAB
POC INR: 2.7
PROTHROMBIN TIME, POC: 32.4

## 2025-07-02 PROCEDURE — 99214 OFFICE O/P EST MOD 30 MIN: CPT | Performed by: FAMILY MEDICINE

## 2025-07-02 PROCEDURE — 85610 PROTHROMBIN TIME: CPT | Performed by: FAMILY MEDICINE

## 2025-07-02 PROCEDURE — PBSHW AMB POC PT/INR: Performed by: FAMILY MEDICINE

## 2025-07-02 PROCEDURE — 1123F ACP DISCUSS/DSCN MKR DOCD: CPT | Performed by: FAMILY MEDICINE

## 2025-07-02 NOTE — PROGRESS NOTES
HPI  Sobeida Fried is a 89 y.o. female who presents for an INR check.  Converted MD visit because she had some questions.      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 and had complications and surgery as young adult       Meds:   Current Outpatient Medications   Medication Sig Dispense Refill    losartan (COZAAR) 25 MG tablet Take 0.5 tablets by mouth daily 45 tablet 1    hydroCHLOROthiazide (HYDRODIURIL) 25 MG tablet TAKE 1 TABLET BY MOUTH DAILY 90 tablet 3    levothyroxine (SYNTHROID) 50 MCG tablet TAKE ONE TABLET BY MOUTH EVERY MORNING BEFORE BREAKFAST 90 tablet 1    warfarin (COUMADIN) 5 MG tablet Take 2 tablets by mouth daily 180 tablet 3    dilTIAZem (TIAZAC) 180 MG extended release capsule Take by mouth daily      doxazosin (CARDURA) 8 MG tablet TAKE 1/2 TABLET BY MOUTH DAILY      warfarin (COUMADIN) 1 MG tablet Take by mouth daily       No current facility-administered medications for this visit.       Allergies:   Allergies   Allergen Reactions    Nitrofurantoin Rash       Smoker:  Social History     Tobacco Use   Smoking Status Never   Smokeless Tobacco Never       ETOH:   Social History     Substance and Sexual Activity   Alcohol Use No       FH:   Family History   Problem Relation Age of Onset    Cancer Mother        ROS:   As listed in HPI. In addition:  Constitutional:   No headache, fever, fatigue, weight loss or weight gain      Cardiac:    No chest pain      Resp:   No cough or shortness of breath      Neuro   No loss of consciousness, dizziness, seizures      Physical Exam:  There were no vitals taken for this visit.  GEN: No apparent distress. Alert and oriented and responds to all questions appropriately.   NEUROLOGIC:  No focal neurologic deficits. Strength and

## 2025-07-02 NOTE — PROGRESS NOTES
Sobeida Fried is a 89 y.o. female who presents today for Anticoagulation monitoring.    Indication: atrial fibrillation  INR Goal: 2.0-3.0.  Current dose:  Coumadin 5 mg daily.  Missed Coumadin Doses:  None  Medication Changes:  no  Dietary Changes:  no    Symptoms: taking coumadin appropriately without any bleeding.    Latest INRs:  Lab Results   Component Value Date/Time    INR 3.1 06/18/2025 09:37 AM    INR 2.4 05/19/2025 10:40 AM    INR 2.7 05/07/2025 10:29 AM    INR 1.9 04/23/2025 12:00 AM    INR 1.8 08/25/2022 09:59 AM    INR 2.2 07/21/2022 01:35 PM    INR 2.5 06/09/2022 10:37 AM        New Coumadin dose:.current treatment plan is effective, no change in therapy.    Next check to be scheduled for  4 weeks.

## 2025-07-30 ENCOUNTER — CLINICAL SUPPORT (OUTPATIENT)
Age: 89
End: 2025-07-30
Payer: MEDICARE

## 2025-07-30 DIAGNOSIS — I48.11 LONGSTANDING PERSISTENT ATRIAL FIBRILLATION (HCC): Primary | ICD-10-CM

## 2025-07-30 LAB
POC INR: 2.4
PROTHROMBIN TIME, POC: 29.1

## 2025-07-30 PROCEDURE — PBSHW AMB POC PT/INR: Performed by: FAMILY MEDICINE

## 2025-07-30 PROCEDURE — 85610 PROTHROMBIN TIME: CPT | Performed by: FAMILY MEDICINE

## 2025-07-30 NOTE — PROGRESS NOTES
Sobeida Fried is a 89 y.o. female who presents today for Anticoagulation monitoring.    Indication: atrial fibrillation  INR Goal: 2.0-3.0.  Current dose:  Coumadin 5 mg Monday-Sat/ Donald 7.5mg  Missed Coumadin Doses:  None  Medication Changes:  no  Dietary Changes:  no    Symptoms: taking coumadin appropriately without any bleeding.    Latest INRs:  Lab Results   Component Value Date/Time    INR 2.7 07/02/2025 10:05 AM    INR 3.1 06/18/2025 09:37 AM    INR 2.4 05/19/2025 10:40 AM    INR 1.9 04/23/2025 12:00 AM    INR 1.8 08/25/2022 09:59 AM    INR 2.2 07/21/2022 01:35 PM    INR 2.5 06/09/2022 10:37 AM        New Coumadin dose:.current treatment plan is effective, no change in therapy.    Next check to be scheduled for  4 weeks.

## 2025-08-13 ENCOUNTER — OFFICE VISIT (OUTPATIENT)
Age: 89
End: 2025-08-13
Payer: MEDICARE

## 2025-08-13 VITALS
TEMPERATURE: 98.4 F | OXYGEN SATURATION: 95 % | HEART RATE: 70 BPM | WEIGHT: 150 LBS | DIASTOLIC BLOOD PRESSURE: 66 MMHG | HEIGHT: 61 IN | RESPIRATION RATE: 16 BRPM | BODY MASS INDEX: 28.32 KG/M2 | SYSTOLIC BLOOD PRESSURE: 150 MMHG

## 2025-08-13 DIAGNOSIS — E53.8 B12 DEFICIENCY: ICD-10-CM

## 2025-08-13 DIAGNOSIS — E03.9 HYPOTHYROIDISM, UNSPECIFIED TYPE: ICD-10-CM

## 2025-08-13 DIAGNOSIS — E61.1 IRON DEFICIENCY: ICD-10-CM

## 2025-08-13 DIAGNOSIS — I10 PRIMARY HYPERTENSION: ICD-10-CM

## 2025-08-13 DIAGNOSIS — E55.9 VITAMIN D DEFICIENCY: ICD-10-CM

## 2025-08-13 DIAGNOSIS — E78.5 HYPERLIPIDEMIA, UNSPECIFIED HYPERLIPIDEMIA TYPE: ICD-10-CM

## 2025-08-13 DIAGNOSIS — Z79.01 LONG TERM (CURRENT) USE OF ANTICOAGULANTS: ICD-10-CM

## 2025-08-13 DIAGNOSIS — I48.11 LONGSTANDING PERSISTENT ATRIAL FIBRILLATION (HCC): Primary | ICD-10-CM

## 2025-08-13 LAB
POC INR: 2.6
PROTHROMBIN TIME, POC: 31.2

## 2025-08-13 PROCEDURE — 1123F ACP DISCUSS/DSCN MKR DOCD: CPT | Performed by: FAMILY MEDICINE

## 2025-08-13 PROCEDURE — 85610 PROTHROMBIN TIME: CPT | Performed by: FAMILY MEDICINE

## 2025-08-13 PROCEDURE — 99214 OFFICE O/P EST MOD 30 MIN: CPT | Performed by: FAMILY MEDICINE

## 2025-08-13 PROCEDURE — PBSHW AMB POC PT/INR: Performed by: FAMILY MEDICINE

## 2025-08-13 PROCEDURE — 1159F MED LIST DOCD IN RCRD: CPT | Performed by: FAMILY MEDICINE

## 2025-08-13 RX ORDER — LOSARTAN POTASSIUM 25 MG/1
25 TABLET ORAL DAILY
Qty: 90 TABLET | Refills: 3 | Status: SHIPPED | OUTPATIENT
Start: 2025-08-13

## 2025-08-13 RX ORDER — LOSARTAN POTASSIUM 25 MG/1
25 TABLET ORAL DAILY
Qty: 90 TABLET | Refills: 3 | Status: SHIPPED | OUTPATIENT
Start: 2025-08-13 | End: 2025-08-13 | Stop reason: SDUPTHER

## 2025-08-13 ASSESSMENT — PATIENT HEALTH QUESTIONNAIRE - PHQ9
SUM OF ALL RESPONSES TO PHQ QUESTIONS 1-9: 0
1. LITTLE INTEREST OR PLEASURE IN DOING THINGS: NOT AT ALL
SUM OF ALL RESPONSES TO PHQ QUESTIONS 1-9: 0
SUM OF ALL RESPONSES TO PHQ QUESTIONS 1-9: 0
2. FEELING DOWN, DEPRESSED OR HOPELESS: NOT AT ALL
SUM OF ALL RESPONSES TO PHQ QUESTIONS 1-9: 0

## 2025-08-14 ENCOUNTER — RESULTS FOLLOW-UP (OUTPATIENT)
Age: 89
End: 2025-08-14

## 2025-08-14 LAB
25(OH)D3+25(OH)D2 SERPL-MCNC: 30.4 NG/ML (ref 30–100)
ALBUMIN SERPL-MCNC: 4.4 G/DL (ref 3.6–4.6)
ALP SERPL-CCNC: 86 IU/L (ref 44–121)
ALT SERPL-CCNC: 14 IU/L (ref 0–32)
AST SERPL-CCNC: 23 IU/L (ref 0–40)
BASOPHILS # BLD AUTO: 0 X10E3/UL (ref 0–0.2)
BASOPHILS NFR BLD AUTO: 0 %
BILIRUB SERPL-MCNC: 0.6 MG/DL (ref 0–1.2)
BUN SERPL-MCNC: 30 MG/DL (ref 10–36)
BUN/CREAT SERPL: 31 (ref 12–28)
CALCIUM SERPL-MCNC: 10 MG/DL (ref 8.7–10.3)
CHLORIDE SERPL-SCNC: 102 MMOL/L (ref 96–106)
CHOLEST SERPL-MCNC: 177 MG/DL (ref 100–199)
CO2 SERPL-SCNC: 24 MMOL/L (ref 20–29)
CREAT SERPL-MCNC: 0.96 MG/DL (ref 0.57–1)
EGFRCR SERPLBLD CKD-EPI 2021: 55 ML/MIN/1.73
EOSINOPHIL # BLD AUTO: 0.1 X10E3/UL (ref 0–0.4)
EOSINOPHIL NFR BLD AUTO: 2 %
ERYTHROCYTE [DISTWIDTH] IN BLOOD BY AUTOMATED COUNT: 13.1 % (ref 11.7–15.4)
FERRITIN SERPL-MCNC: 346 NG/ML (ref 15–150)
FOLATE SERPL-MCNC: 18 NG/ML
GLOBULIN SER CALC-MCNC: 2.3 G/DL (ref 1.5–4.5)
GLUCOSE SERPL-MCNC: 94 MG/DL (ref 70–99)
HCT VFR BLD AUTO: 37.6 % (ref 34–46.6)
HDLC SERPL-MCNC: 67 MG/DL
HGB BLD-MCNC: 12.1 G/DL (ref 11.1–15.9)
IMM GRANULOCYTES # BLD AUTO: 0 X10E3/UL (ref 0–0.1)
IMM GRANULOCYTES NFR BLD AUTO: 0 %
IMP & REVIEW OF LAB RESULTS: NORMAL
IRON SATN MFR SERPL: 26 % (ref 15–55)
IRON SERPL-MCNC: 84 UG/DL (ref 27–139)
LDLC SERPL CALC-MCNC: 96 MG/DL (ref 0–99)
LYMPHOCYTES # BLD AUTO: 1.8 X10E3/UL (ref 0.7–3.1)
LYMPHOCYTES NFR BLD AUTO: 32 %
MCH RBC QN AUTO: 31.1 PG (ref 26.6–33)
MCHC RBC AUTO-ENTMCNC: 32.2 G/DL (ref 31.5–35.7)
MCV RBC AUTO: 97 FL (ref 79–97)
MONOCYTES # BLD AUTO: 0.6 X10E3/UL (ref 0.1–0.9)
MONOCYTES NFR BLD AUTO: 12 %
NEUTROPHILS # BLD AUTO: 2.8 X10E3/UL (ref 1.4–7)
NEUTROPHILS NFR BLD AUTO: 54 %
PLATELET # BLD AUTO: 163 X10E3/UL (ref 150–450)
POTASSIUM SERPL-SCNC: 4.3 MMOL/L (ref 3.5–5.2)
PROT SERPL-MCNC: 6.7 G/DL (ref 6–8.5)
RBC # BLD AUTO: 3.89 X10E6/UL (ref 3.77–5.28)
REPORT: NORMAL
REPORT: NORMAL
SODIUM SERPL-SCNC: 139 MMOL/L (ref 134–144)
T4 FREE SERPL-MCNC: 1.33 NG/DL (ref 0.82–1.77)
TIBC SERPL-MCNC: 318 UG/DL (ref 250–450)
TRIGL SERPL-MCNC: 77 MG/DL (ref 0–149)
TSH SERPL DL<=0.005 MIU/L-ACNC: 3.19 UIU/ML (ref 0.45–4.5)
UIBC SERPL-MCNC: 234 UG/DL (ref 118–369)
VIT B12 SERPL-MCNC: 335 PG/ML (ref 232–1245)
VLDLC SERPL CALC-MCNC: 14 MG/DL (ref 5–40)
WBC # BLD AUTO: 5.4 X10E3/UL (ref 3.4–10.8)

## 2025-08-20 ENCOUNTER — OFFICE VISIT (OUTPATIENT)
Age: 89
End: 2025-08-20
Payer: MEDICARE

## 2025-08-20 VITALS
TEMPERATURE: 98 F | DIASTOLIC BLOOD PRESSURE: 62 MMHG | RESPIRATION RATE: 17 BRPM | HEIGHT: 61 IN | BODY MASS INDEX: 28.7 KG/M2 | HEART RATE: 62 BPM | OXYGEN SATURATION: 95 % | SYSTOLIC BLOOD PRESSURE: 155 MMHG | WEIGHT: 152 LBS

## 2025-08-20 DIAGNOSIS — R60.0 LEG EDEMA: Primary | ICD-10-CM

## 2025-08-20 DIAGNOSIS — I10 PRIMARY HYPERTENSION: ICD-10-CM

## 2025-08-20 DIAGNOSIS — I48.11 LONGSTANDING PERSISTENT ATRIAL FIBRILLATION (HCC): ICD-10-CM

## 2025-08-20 PROCEDURE — 1126F AMNT PAIN NOTED NONE PRSNT: CPT | Performed by: FAMILY MEDICINE

## 2025-08-20 PROCEDURE — 99214 OFFICE O/P EST MOD 30 MIN: CPT | Performed by: FAMILY MEDICINE

## 2025-08-20 PROCEDURE — 1123F ACP DISCUSS/DSCN MKR DOCD: CPT | Performed by: FAMILY MEDICINE

## 2025-08-20 RX ORDER — FUROSEMIDE 20 MG/1
20 TABLET ORAL DAILY
Qty: 90 TABLET | Refills: 1 | Status: SHIPPED | OUTPATIENT
Start: 2025-08-20

## 2025-09-03 ENCOUNTER — OFFICE VISIT (OUTPATIENT)
Age: 89
End: 2025-09-03
Payer: MEDICARE

## 2025-09-03 VITALS
TEMPERATURE: 97.2 F | OXYGEN SATURATION: 95 % | HEIGHT: 61 IN | DIASTOLIC BLOOD PRESSURE: 74 MMHG | RESPIRATION RATE: 17 BRPM | BODY MASS INDEX: 28.43 KG/M2 | HEART RATE: 65 BPM | WEIGHT: 150.6 LBS | SYSTOLIC BLOOD PRESSURE: 146 MMHG

## 2025-09-03 DIAGNOSIS — I10 PRIMARY HYPERTENSION: ICD-10-CM

## 2025-09-03 DIAGNOSIS — I48.11 LONGSTANDING PERSISTENT ATRIAL FIBRILLATION (HCC): Primary | ICD-10-CM

## 2025-09-03 DIAGNOSIS — R60.0 LEG EDEMA: ICD-10-CM

## 2025-09-03 LAB
POC INR: 2.4
PROTHROMBIN TIME, POC: 29.1

## 2025-09-03 PROCEDURE — 99214 OFFICE O/P EST MOD 30 MIN: CPT | Performed by: FAMILY MEDICINE

## 2025-09-03 PROCEDURE — 85610 PROTHROMBIN TIME: CPT | Performed by: FAMILY MEDICINE

## 2025-09-03 PROCEDURE — PBSHW AMB POC PT/INR: Performed by: FAMILY MEDICINE

## 2025-09-03 PROCEDURE — 1123F ACP DISCUSS/DSCN MKR DOCD: CPT | Performed by: FAMILY MEDICINE

## 2025-09-03 PROCEDURE — 1126F AMNT PAIN NOTED NONE PRSNT: CPT | Performed by: FAMILY MEDICINE

## 2025-09-04 LAB
ALBUMIN SERPL-MCNC: 4.6 G/DL (ref 3.6–4.6)
ALP SERPL-CCNC: 90 IU/L (ref 44–121)
ALT SERPL-CCNC: 13 IU/L (ref 0–32)
AST SERPL-CCNC: 22 IU/L (ref 0–40)
BILIRUB SERPL-MCNC: 0.4 MG/DL (ref 0–1.2)
BUN SERPL-MCNC: 33 MG/DL (ref 10–36)
BUN/CREAT SERPL: 29 (ref 12–28)
CALCIUM SERPL-MCNC: 9.7 MG/DL (ref 8.7–10.3)
CHLORIDE SERPL-SCNC: 101 MMOL/L (ref 96–106)
CO2 SERPL-SCNC: 27 MMOL/L (ref 20–29)
CREAT SERPL-MCNC: 1.14 MG/DL (ref 0.57–1)
EGFRCR SERPLBLD CKD-EPI 2021: 45 ML/MIN/1.73
GLOBULIN SER CALC-MCNC: 2.4 G/DL (ref 1.5–4.5)
GLUCOSE SERPL-MCNC: 96 MG/DL (ref 70–99)
POTASSIUM SERPL-SCNC: 4.1 MMOL/L (ref 3.5–5.2)
PROT SERPL-MCNC: 7 G/DL (ref 6–8.5)
REPORT: NORMAL
SODIUM SERPL-SCNC: 141 MMOL/L (ref 134–144)

## 2025-09-05 ENCOUNTER — RESULTS FOLLOW-UP (OUTPATIENT)
Age: 89
End: 2025-09-05

## (undated) DEVICE — SUTURE VCRL SZ 3-0 L27IN ABSRB UD L26MM SH 1/2 CIR J416H

## (undated) DEVICE — SPLINT CAST W4XL15IN GRN STRENGTH PLSTR OF PARIS FAST SET

## (undated) DEVICE — INFECTION CONTROL KIT SYS

## (undated) DEVICE — STERILE POLYISOPRENE POWDER-FREE SURGICAL GLOVES: Brand: PROTEXIS

## (undated) DEVICE — ZIMMER® STERILE DISPOSABLE TOURNIQUET CUFF WITH PLC, DUAL PORT, SINGLE BLADDER, 18 IN. (46 CM)

## (undated) DEVICE — LIGHT HANDLE: Brand: DEVON

## (undated) DEVICE — 3M™ TEGADERM™ TRANSPARENT FILM DRESSING FRAME STYLE, 1624W, 2-3/8 IN X 2-3/4 IN (6 CM X 7 CM), 100/CT 4CT/CASE: Brand: 3M™ TEGADERM™

## (undated) DEVICE — GAUZE SPONGES,12 PLY: Brand: CURITY

## (undated) DEVICE — CONTINU-FLO SOLUTION SET, 2 INJECTION SITES, MALE LUER LOCK ADAPTER WITH RETRACTABLE COLLAR, LARGE BORE STOPCOCK WITH ROTATING MALE LUER LOCK EXTENSION SET, 2 INJECTION SITES, MALE LUER LOCK ADAPTER WITH RETRACTABLE COLLAR: Brand: INTERLINK/CONTINU-FLO

## (undated) DEVICE — Z DISCONTINUED NO SUB IDED BUCKET CAST INF CLAV STRP WHT BCKL CLSR PREM DISPOSABLE

## (undated) DEVICE — 1200 GUARD II KIT W/5MM TUBE W/O VAC TUBE: Brand: GUARDIAN

## (undated) DEVICE — (D)PREP SKN CHLRAPRP APPL 26ML -- CONVERT TO ITEM 371833

## (undated) DEVICE — SET 2ND L34IN N DEHP THE QUEENS MED CNTR VALUELINK

## (undated) DEVICE — BIPOLAR FORCEPS CORD,BANANA LEADS: Brand: VALLEYLAB

## (undated) DEVICE — SOLUTION LACTATED RINGERS INJECTION USP

## (undated) DEVICE — HAND I-LF: Brand: MEDLINE INDUSTRIES, INC.

## (undated) DEVICE — ANTI-EMBOLISM STOCKINGS,KNEE LENGTH,MEDIUM,REGULAR,SIZE C-: Brand: T.E.D.

## (undated) DEVICE — KERLIX BANDAGE ROLL: Brand: KERLIX

## (undated) DEVICE — TOWEL SURG W17XL27IN STD BLU COT NONFENESTRATED PREWASHED

## (undated) DEVICE — PADDING CAST 4 INX5 YD STRL

## (undated) DEVICE — OCCLUSIVE GAUZE STRIP,3% BISMUTH TRIBROMOPHENATE IN PETROLATUM BLEND: Brand: XEROFORM

## (undated) DEVICE — SLING ARM LIFETEC ORTH UNIV --

## (undated) DEVICE — CATHETER IV 20GA L1.25IN FEP STR HUB TEF INTROCAN SFTY

## (undated) DEVICE — INTENDED FOR TISSUE SEPARATION, AND OTHER PROCEDURES THAT REQUIRE A SHARP SURGICAL BLADE TO PUNCTURE OR CUT.: Brand: BARD-PARKER ® CARBON RIB-BACK BLADES

## (undated) DEVICE — SUTURE ETHLN SZ 4-0 L18IN NONABSORBABLE BLK L19MM PS-2 3/8 1667H

## (undated) DEVICE — KWIRE

## (undated) DEVICE — ICE PK EYE 4 1/2INX10IN (15/BX 2BX/CS

## (undated) DEVICE — KENDALL DL ECG CABLE AND LEAD WIRE SYSTEM, 3-LEAD, SINGLE PATIENT USE: Brand: KENDALL

## (undated) DEVICE — MEDI-VAC NON-CONDUCTIVE SUCTION TUBING: Brand: CARDINAL HEALTH

## (undated) DEVICE — SOLUTION IV 1000ML 0.9% SOD CHL

## (undated) DEVICE — Z DISCONTINUED PER MEDLINE (LOW STOCK)  USE 2422770 DRAPE C ARM W54XL78IN FOR FLROSCN